# Patient Record
Sex: MALE | Race: WHITE | NOT HISPANIC OR LATINO | Employment: OTHER | URBAN - METROPOLITAN AREA
[De-identification: names, ages, dates, MRNs, and addresses within clinical notes are randomized per-mention and may not be internally consistent; named-entity substitution may affect disease eponyms.]

---

## 2017-01-13 ENCOUNTER — OFFICE VISIT (OUTPATIENT)
Dept: ENDOCRINOLOGY | Age: 67
End: 2017-01-13

## 2017-01-13 VITALS
HEIGHT: 70 IN | WEIGHT: 210 LBS | SYSTOLIC BLOOD PRESSURE: 128 MMHG | HEART RATE: 68 BPM | BODY MASS INDEX: 30.06 KG/M2 | DIASTOLIC BLOOD PRESSURE: 72 MMHG

## 2017-01-13 DIAGNOSIS — E11.9 TYPE 2 DIABETES MELLITUS WITHOUT COMPLICATION, WITHOUT LONG-TERM CURRENT USE OF INSULIN (HCC): Primary | ICD-10-CM

## 2017-01-13 DIAGNOSIS — I10 ESSENTIAL HYPERTENSION: ICD-10-CM

## 2017-01-13 DIAGNOSIS — E78.49 OTHER HYPERLIPIDEMIA: ICD-10-CM

## 2017-01-13 LAB
25(OH)D3+25(OH)D2 SERPL-MCNC: 48.9 NG/ML (ref 30–100)
ALBUMIN SERPL-MCNC: 4.7 G/DL (ref 3.5–5.2)
ALBUMIN/GLOB SERPL: 1.8 G/DL
ALP SERPL-CCNC: 75 U/L (ref 39–117)
ALT SERPL-CCNC: 41 U/L (ref 1–41)
AST SERPL-CCNC: 26 U/L (ref 1–40)
BILIRUB SERPL-MCNC: 0.4 MG/DL (ref 0.1–1.2)
BUN SERPL-MCNC: 15 MG/DL (ref 8–23)
BUN/CREAT SERPL: 16.9 (ref 7–25)
CALCIUM SERPL-MCNC: 10.3 MG/DL (ref 8.6–10.5)
CHLORIDE SERPL-SCNC: 99 MMOL/L (ref 98–107)
CHOLEST SERPL-MCNC: 120 MG/DL (ref 0–200)
CO2 SERPL-SCNC: 27.9 MMOL/L (ref 22–29)
CREAT SERPL-MCNC: 0.89 MG/DL (ref 0.76–1.27)
FOLATE SERPL-MCNC: >20 NG/ML (ref 4.78–24.2)
GLOBULIN SER CALC-MCNC: 2.6 GM/DL
GLUCOSE SERPL-MCNC: 211 MG/DL (ref 65–99)
HBA1C MFR BLD: 9.3 % (ref 4.8–5.6)
HDLC SERPL-MCNC: 33 MG/DL (ref 40–60)
LDLC SERPL CALC-MCNC: 54 MG/DL (ref 0–100)
POTASSIUM SERPL-SCNC: 4.7 MMOL/L (ref 3.5–5.2)
PROT SERPL-MCNC: 7.3 G/DL (ref 6–8.5)
SODIUM SERPL-SCNC: 141 MMOL/L (ref 136–145)
TRIGL SERPL-MCNC: 166 MG/DL (ref 0–150)
TSH SERPL DL<=0.005 MIU/L-ACNC: 1.66 MIU/ML (ref 0.27–4.2)
UNABLE TO VOID: NORMAL
VIT B12 SERPL-MCNC: 1776 PG/ML (ref 211–946)
VLDLC SERPL CALC-MCNC: 33.2 MG/DL (ref 5–40)

## 2017-01-13 PROCEDURE — 99204 OFFICE O/P NEW MOD 45 MIN: CPT | Performed by: INTERNAL MEDICINE

## 2017-01-13 RX ORDER — ATORVASTATIN CALCIUM 20 MG/1
TABLET, FILM COATED ORAL
COMMUNITY
Start: 2016-10-26 | End: 2017-02-09 | Stop reason: SDUPTHER

## 2017-01-13 NOTE — PROGRESS NOTES
CC : Type 2 dm    Andrea Fam 66 y.o. presents with Type 2 dm as a new patient. Referred by Dr. Denise.     Type 2 dm - Diagnosed about 18 years ago. Was started on oral agents initially. Insulin was started about 10 years ago. Currently on Levemir 10 units at bed time, Victoza 0.6 mg subq daily, Metformin 1000 mg po daily.    Checks BG once daily, FBG - 191 - 245 mg/dl.   No increased urination or increased thirst. No BG less than 60 mg/dl in the last one month, does have hypoglycemic awareness. Highest BG in the last 1 month was -  268 mg/dl.  Pt got his log book for me. No nausea and vomiting.   Up to date with eye exam and no dm retinopathy.   C/o tingling or numbness in his b/l feet and hands, no ulcers and amputations of his feet.   No CAD, CKD,CVA per pt.   Pt is physically active. Weight has been stable.   Pt tries to follow DM diet for most part.     Hba1c - 11% per pt.     Low testosterone - On androgel 1.62% one application daily. Managed by PCP.     HLP - On lipitor 20 mg po daily,  Niacin 500 mg po daily, Fish oil 1 tab daily.     I have reviewed the patient's allergies, medicines, past medical hx, family hx and social hx in detail.    Past Medical History   Diagnosis Date   • Diabetes mellitus    • Neuropathy    • Testosterone deficiency        Family History   Problem Relation Age of Onset   • Alzheimer's disease Mother    • Lymphoma Paternal Grandmother        Social History     Social History   • Marital status:      Spouse name: N/A   • Number of children: N/A   • Years of education: N/A     Occupational History   • Not on file.     Social History Main Topics   • Smoking status: Never Smoker   • Smokeless tobacco: Not on file   • Alcohol use Yes      Comment: 2  drinks per  month   • Drug use: Not on file   • Sexual activity: Not on file     Other Topics Concern   • Not on file     Social History Narrative   • No narrative on file       No Known Allergies      Current Outpatient Prescriptions:    •  atorvastatin (LIPITOR) 20 MG tablet, , Disp: , Rfl:   •  Cholecalciferol (VITAMIN D) 2000 UNITS tablet, Take 2,000 Units by mouth daily., Disp: , Rfl:   •  FOLIC ACID PO, Take  by mouth., Disp: , Rfl:   •  insulin detemir (LEVEMIR) 100 UNIT/ML injection, Inject  under the skin daily., Disp: , Rfl:   •  metFORMIN (GLUCOPHAGE) 500 MG tablet, Take 500 mg by mouth 2 (two) times a day with meals., Disp: , Rfl:   •  niacin 500 MG tablet, Take 500 mg by mouth every night., Disp: , Rfl:   •  Omega-3 Fatty Acids (FISH OIL) 1000 MG capsule capsule, Take  by mouth daily with breakfast., Disp: , Rfl:   •  OXcarbazepine  MG tablet sustained-release 24 hour, Take  by mouth., Disp: , Rfl:   •  Testosterone (FORTESTA) 10 MG/ACT (2%) gel, Place  on the skin., Disp: , Rfl:   •  vitamin B-12 (CYANOCOBALAMIN) 1000 MCG tablet, Take 1,000 mcg by mouth daily., Disp: , Rfl:   •  vitamin C (ASCORBIC ACID) 500 MG tablet, Take 500 mg by mouth daily., Disp: , Rfl:     Review of Systems   Constitutional: Negative for appetite change, chills, diaphoresis and fatigue.   HENT: Negative for hearing loss, nosebleeds, postnasal drip, trouble swallowing and voice change.    Eyes: Negative for photophobia, discharge and visual disturbance.   Respiratory: Negative for cough, shortness of breath and wheezing.    Cardiovascular: Negative for chest pain, palpitations and leg swelling.   Gastrointestinal: Negative for abdominal distention, abdominal pain, constipation, diarrhea, nausea and vomiting.   Endocrine: Negative for cold intolerance, heat intolerance, polydipsia and polyuria.   Genitourinary: Negative for dysuria, frequency and hematuria.   Musculoskeletal: Negative for arthralgias, back pain and myalgias.   Skin: Negative for pallor, rash and wound.   Neurological: Positive for numbness. Negative for dizziness, tremors, seizures, syncope, weakness and headaches.   Hematological: Negative for adenopathy.   Psychiatric/Behavioral:  "Negative for agitation, confusion and sleep disturbance. The patient is not nervous/anxious.        Objective:     Visit Vitals   • /72   • Pulse 68   • Ht 70\" (177.8 cm)   • Wt 210 lb (95.3 kg)   • BMI 30.13 kg/m2       Physical Exam   Constitutional: He is oriented to person, place, and time. He appears well-developed and well-nourished.   HENT:   Head: Normocephalic and atraumatic.   Mouth/Throat: Oropharynx is clear and moist.   Eyes: Conjunctivae and EOM are normal. Pupils are equal, round, and reactive to light.   Neck: Normal range of motion. Neck supple. Carotid bruit is not present. No tracheal deviation present. No thyromegaly present.   Acanthosis nigricans   Cardiovascular: Normal rate, regular rhythm and normal heart sounds.    HR - 72/min   Pulmonary/Chest: Effort normal and breath sounds normal. No stridor. He has no wheezes.   Abdominal: Soft. Bowel sounds are normal. He exhibits no distension. There is no tenderness. No hernia.   Musculoskeletal: Normal range of motion. He exhibits no edema.   Lymphadenopathy:     He has no cervical adenopathy.   Neurological: He is alert and oriented to person, place, and time. He has normal reflexes.   Intact pin prick and proprioception   Skin: Skin is warm and dry.   Psychiatric: He has a normal mood and affect. His behavior is normal. Judgment normal.   Vitals reviewed.         Results Review:    I reviewed the patient's new clinical results.    No results found for any previous visit.    Diagnoses and all orders for this visit:    Type 2 diabetes mellitus without complication, without long-term current use of insulin  -     Hemoglobin A1c  -     Vitamin D 25 Hydroxy  -     Vitamin B12 & Folate  -     TSH  -     Lipid Panel  -     Microalbumin / Creatinine Urine Ratio  -     Comprehensive Metabolic Panel    Other hyperlipidemia  -     Hemoglobin A1c  -     Vitamin D 25 Hydroxy  -     Vitamin B12 & Folate  -     TSH  -     Lipid Panel  -     Microalbumin / " Creatinine Urine Ratio  -     Comprehensive Metabolic Panel    Essential hypertension  -     Hemoglobin A1c  -     Vitamin D 25 Hydroxy  -     Vitamin B12 & Folate  -     TSH  -     Lipid Panel  -     Microalbumin / Creatinine Urine Ratio  -     Comprehensive Metabolic Panel    Type 2 diabetes mellitus  Will check HbA1c  Will increase levemir to 15 units at bedtime  Will consider increasing metformin to 1000 mg twice daily based on today's blood work up  No history of pancreatitis, we will consider increasing the dose of victoza.  Advised patient to check his blood sugars at least 2-3 times a day.  Will refer the pt for DM education.     Hyperlipidemia  Will check lipid panel     Low testosterone levels  Managed by primary care physician.    Asya Henao MD  01/13/17

## 2017-01-13 NOTE — MR AVS SNAPSHOT
Andrea Fam   1/13/2017 8:00 AM   Office Visit    Dept Phone:  [phone]   Encounter #:  [id number]    Provider:  Asya Henao MD   Department:  [facility]                Your Full Care Plan              Today's Medication Changes          These changes are accurate as of: 1/13/17  9:30 AM.  If you have any questions, ask your nurse or doctor.               Stop taking medication(s)listed here:     neomycin-polymyxin-hydrocortisone [id number] otic solution   Commonly known as:  CORTISPORIN   Stopped by:  Asya Henao MD                      Your Updated Medication List          This list is accurate as of: 1/13/17  9:30 AM.  Always use your most recent med list.                atorvastatin 20 MG tablet   Commonly known as:  LIPITOR       fish oil 1000 MG capsule capsule       FOLIC ACID PO       FORTESTA 10 MG/ACT (2%) gel   Generic drug:  Testosterone       insulin detemir 100 UNIT/ML injection   Commonly known as:  LEVEMIR       metFORMIN 500 MG tablet   Commonly known as:  GLUCOPHAGE       niacin 500 MG tablet       OXcarbazepine  MG tablet sustained-release 24 hour       vitamin B-12 1000 MCG tablet   Commonly known as:  CYANOCOBALAMIN       vitamin C 500 MG tablet   Commonly known as:  ASCORBIC ACID       Vitamin D 2000 UNITS tablet               We Performed the Following     Ambulatory Referral to Diabetic Education     Comprehensive Metabolic Panel     Hemoglobin A1c     Lipid Panel     Microalbumin / Creatinine Urine Ratio     TSH     Vitamin B12 & Folate     Vitamin D 25 Hydroxy       You Were Diagnosed With        Codes Comments    Type 2 diabetes mellitus without complication, without long-term current use of insulin    -  Primary ICD-10-CM: E11.9  ICD-9-CM: 250.00     Other hyperlipidemia     ICD-10-CM: E78.4  ICD-9-CM: 272.4     Essential hypertension     ICD-10-CM: I10  ICD-9-CM: 401.9       Instructions     None    Patient  "Instructions History      Upcoming Appointments     Visit Type Date Time Department    NEW PATIENT 2017  8:00 AM ALEX CABRERA    OFFICE VISIT 3/13/2017  9:15 AM ALEX Mendozahart Signup     [facility] [employer] allows you to send messages to your doctor, view your test results, renew your prescriptions, schedule appointments, and more. To sign up, go to [employer] and click on the Sign Up Now link in the New User? box. Enter your [employer] Activation Code exactly as it appears below along with the last four digits of your Social Security Number and your Date of Birth () to complete the sign-up process. If you do not sign up before the expiration date, you must request a new code.    [employer] Activation Code: [email or web address]  Expires: 2017  9:28 AM    If you have questions, you can email [email or web address] or call [phone] to talk to our [employer] staff. Remember, [employer] is NOT to be used for urgent needs. For medical emergencies, dial 911.               Other Info from Your Visit           Your Appointments     Mar 13, 2017  9:15 AM EDT   Office Visit with Asya Henao MD   [facility] (--)    [address] [place] [id number] [phone]           Arrive 15 minutes prior to appointment.              Allergies     No Known Allergies      Vital Signs     Blood Pressure Pulse Height Weight Body Mass Index Smoking Status    128/72 68 70\" (177.8 cm) 210 lb (95.3 kg) 30.13 kg/m2 Never Smoker      Problems and Diagnoses Noted     Type 2 diabetes mellitus without complication, without long-term current use of insulin    -  Primary    Other hyperlipidemia        High blood pressure            "

## 2017-01-31 NOTE — TELEPHONE ENCOUNTER
----- Message from Tasia Reynolds sent at 1/30/2017 11:59 AM EST -----  Contact: star  [phone]  [facility]  Needs to say glucophage  Prescription was filled in nov  But needs to make sure next time it is read as of the below      metFORMIN GLUCOPHAGE 500 MG tablet         Take 500 mg by mouth 2 two times a day with meals.       [facility] - [address] - [phone]  - [phone] (Phone)  [phone] (Fax)          Sent to mail order pharmacy

## 2017-02-09 ENCOUNTER — HOSPITAL ENCOUNTER (OUTPATIENT)
Dept: GENERAL RADIOLOGY | Facility: HOSPITAL | Age: 67
Discharge: HOME | End: 2017-02-09
Admitting: ORTHOPAEDIC SURGERY

## 2017-02-09 ENCOUNTER — APPOINTMENT (OUTPATIENT)
Dept: PREADMISSION TESTING | Facility: HOSPITAL | Age: 67
End: 2017-02-09

## 2017-02-09 VITALS
HEIGHT: 70 IN | BODY MASS INDEX: 30.26 KG/M2 | WEIGHT: 211.4 LBS | RESPIRATION RATE: 18 BRPM | SYSTOLIC BLOOD PRESSURE: 138 MMHG | HEART RATE: 91 BPM | DIASTOLIC BLOOD PRESSURE: 84 MMHG | TEMPERATURE: 96.9 F | OXYGEN SATURATION: 95 %

## 2017-02-09 LAB
ABO GROUP BLD: NORMAL
ANION GAP SERPL CALCULATED.3IONS-SCNC: 16.1 MMOL/L
BACTERIA UR QL AUTO: ABNORMAL /HPF
BILIRUB UR QL STRIP: NEGATIVE
BLD GP AB SCN SERPL QL: NEGATIVE
BUN BLD-MCNC: 12 MG/DL (ref 8–23)
BUN/CREAT SERPL: 14.8 (ref 7–25)
CALCIUM SPEC-SCNC: 9.5 MG/DL (ref 8.6–10.5)
CHLORIDE SERPL-SCNC: 96 MMOL/L (ref 98–107)
CLARITY UR: CLEAR
CO2 SERPL-SCNC: 26.9 MMOL/L (ref 22–29)
COD CRY URNS QL: ABNORMAL /HPF
COLOR UR: YELLOW
CREAT BLD-MCNC: 0.81 MG/DL (ref 0.76–1.27)
DEPRECATED RDW RBC AUTO: 43.3 FL (ref 37–54)
ERYTHROCYTE [DISTWIDTH] IN BLOOD BY AUTOMATED COUNT: 12.9 % (ref 11.5–14.5)
GFR SERPL CREATININE-BSD FRML MDRD: 95 ML/MIN/1.73
GLUCOSE BLD-MCNC: 207 MG/DL (ref 65–99)
GLUCOSE UR STRIP-MCNC: ABNORMAL MG/DL
HCT VFR BLD AUTO: 41.6 % (ref 40.4–52.2)
HGB BLD-MCNC: 14.2 G/DL (ref 13.7–17.6)
HGB UR QL STRIP.AUTO: NEGATIVE
HYALINE CASTS UR QL AUTO: ABNORMAL /LPF
KETONES UR QL STRIP: ABNORMAL
LEUKOCYTE ESTERASE UR QL STRIP.AUTO: ABNORMAL
MCH RBC QN AUTO: 31.4 PG (ref 27–32.7)
MCHC RBC AUTO-ENTMCNC: 34.1 G/DL (ref 32.6–36.4)
MCV RBC AUTO: 92 FL (ref 79.8–96.2)
NITRITE UR QL STRIP: NEGATIVE
PH UR STRIP.AUTO: <=5 [PH] (ref 5–8)
PLATELET # BLD AUTO: 200 10*3/MM3 (ref 140–500)
PMV BLD AUTO: 9.4 FL (ref 6–12)
POTASSIUM BLD-SCNC: 4.1 MMOL/L (ref 3.5–5.2)
PROT UR QL STRIP: NEGATIVE
RBC # BLD AUTO: 4.52 10*6/MM3 (ref 4.6–6)
RBC # UR: ABNORMAL /HPF
REF LAB TEST METHOD: ABNORMAL
RH BLD: NEGATIVE
SODIUM BLD-SCNC: 139 MMOL/L (ref 136–145)
SP GR UR STRIP: 1.03 (ref 1–1.03)
SQUAMOUS #/AREA URNS HPF: ABNORMAL /HPF
UROBILINOGEN UR QL STRIP: ABNORMAL
WBC NRBC COR # BLD: 5.34 10*3/MM3 (ref 4.5–10.7)
WBC UR QL AUTO: ABNORMAL /HPF

## 2017-02-09 PROCEDURE — 93010 ELECTROCARDIOGRAM REPORT: CPT | Performed by: INTERNAL MEDICINE

## 2017-02-09 PROCEDURE — 86900 BLOOD TYPING SEROLOGIC ABO: CPT

## 2017-02-09 PROCEDURE — 86850 RBC ANTIBODY SCREEN: CPT

## 2017-02-09 PROCEDURE — 87086 URINE CULTURE/COLONY COUNT: CPT | Performed by: ORTHOPAEDIC SURGERY

## 2017-02-09 PROCEDURE — 81001 URINALYSIS AUTO W/SCOPE: CPT | Performed by: ORTHOPAEDIC SURGERY

## 2017-02-09 PROCEDURE — 86901 BLOOD TYPING SEROLOGIC RH(D): CPT

## 2017-02-09 PROCEDURE — 80048 BASIC METABOLIC PNL TOTAL CA: CPT | Performed by: ORTHOPAEDIC SURGERY

## 2017-02-09 PROCEDURE — 73030 X-RAY EXAM OF SHOULDER: CPT

## 2017-02-09 PROCEDURE — 36415 COLL VENOUS BLD VENIPUNCTURE: CPT

## 2017-02-09 PROCEDURE — 85027 COMPLETE CBC AUTOMATED: CPT | Performed by: ORTHOPAEDIC SURGERY

## 2017-02-09 PROCEDURE — 93005 ELECTROCARDIOGRAM TRACING: CPT

## 2017-02-09 RX ORDER — ATORVASTATIN CALCIUM 10 MG/1
10 TABLET, FILM COATED ORAL DAILY
COMMUNITY
End: 2018-11-01

## 2017-02-09 RX ORDER — TESTOSTERONE 20.25 MG/1.25G
2 GEL TOPICAL DAILY
COMMUNITY
End: 2017-11-01 | Stop reason: SDUPTHER

## 2017-02-09 NOTE — DISCHARGE INSTRUCTIONS
Take the following medications the morning of surgery with a small sip of water.    THE HOSPITAL WILL CALL YOU THE DAY BEFORE WITH YOUR ARRIVAL TIME.      General Instructions:  • Do not eat or drink after midnight: includes water, mints, or gum. You may brush your teeth.  • Do not smoke, chew tobacco, or drink alcohol.  • Bring medications in original bottles, any inhalers and if applicable your C-PAP/ BI-PAP machine.  • Bring any papers given to you in the doctor’s office.  • Wear clean comfortable clothes and socks.  • Do not wear contact lenses or make-up.  Bring a case for your glasses if applicable.   • Bring crutches or walker if applicable.  • Leave all other valuables and jewelry at home.    If you were given a blood bank ID arm band remember to bring it with you the day of surgery.    Preventing a Surgical Site Infection:  Shower on the morning of surgery using a fresh bar of anti-bacterial soap (such as Dial) and clean washcloth.  Dry with a clean towel and dress in clean clothing.  For 2 to 3 days before surgery, avoid shaving with a razor near where you will have surgery because the razor can irritate skin and make it easier to develop an infection  Ask your surgeon if you will be receiving antibiotics prior to surgery  Make sure you, your family, and all healthcare providers clean their hands with soap and water or an alcohol based hand  before caring for you or your wound  If at all possible, quit smoking as many days before surgery as you can.    Day of surgery:  Upon arrival, a Pre-op nurse and Anesthesiologist will review your health history, obtain vital signs, and answer questions you may have.  The only belongings needed at this time will be your home medications and if applicable your C-PAP/BI-PAP machine.  If you are staying overnight your family can leave the rest of your belongings in the car and bring them to your room later.  A Pre-op nurse will start an IV and you may receive  medication in preparation for surgery, including something to help you relax.  Your family will be able to see you in the Pre-op area.  While you are in surgery your family should notify the waiting room  if they leave the waiting room area and provide a contact phone number.    Please be aware that surgery does come with discomfort.  We want to make every effort to control your discomfort so please discuss any uncontrolled symptoms with your nurse.   Your doctor will most likely have prescribed pain medications.      If you are going home after surgery you will receive individualized written care instructions before being discharged.  A responsible adult must drive you to and from the hospital on the day of your surgery and stay with you for 24 hours.    If you are staying overnight following surgery, you will be transported to your hospital room following the recovery period.  [facility] has all private rooms.    If you have any questions please call Pre-Admission Testing at [phone].  Deductibles and co-payments are collected on the day of service. Please be prepared to pay the required co-pay, deductible or deposit on the day of service as defined by your plan.    2% CHLORAHEXIDINE GLUCONATE* CLOTH  Preparing or “prepping” skin before surgery can reduce the risk of infection at the surgical site. To make the process easier, [facility] has chosen disposable cloths moistened with a rinse-free, 2% Chlorhexidine Gluconate (CHG) antiseptic solution. The steps below outline the prepping process and should be carefully followed.        Use the prep cloth on the area that is circled in the diagram             Directions Night before Surgery  1) Shower using a fresh bar of anti-bacterial soap (such as Dial) and clean washcloth.  Use a clean towel to completely dry your skin.  2) Do not use any lotions, oils or creams on your skin.  3) Open the package and remove 1 cloth, wipe  your skin for 30 seconds in a circular motion.  Allow to dry for 3 minutes.  4) Repeat #3 with second cloth.  5) Do not touch your eyes, ears, or mouth with the prep cloth.  6) Allow the wet prep solution to air dry.  7) Discard the prep cloth and wash your hands with soap and water.   8) Dress in clean bed clothes and sleep on fresh clean bed sheets.   9) You may experience some temporary itching after the prep.    Directions Day of Surgery  1) Repeat steps 1,2,3,4,5,6,7, and 9.   2) Dress in clean clothes before coming to the hospital.    BACTROBAN NASAL OINTMENT  There are many germs normally in your nose. Bactroban is an ointment that will help reduce these germs. Please follow these instructions for Bactroban use:      ____The day before surgery in the morning  Date________    ____The day before surgery in the evening              Date________    ____The day of surgery in the morning    Date________    **Squirt ½ package of Bactroban Ointment onto a cotton applicator and apply to inside of 1st nostril.  Squirt the remaining Bactroban and apply to the inside of the other nostril.

## 2017-02-10 LAB — BACTERIA SPEC AEROBE CULT: NORMAL

## 2017-03-13 ENCOUNTER — OFFICE VISIT (OUTPATIENT)
Dept: ENDOCRINOLOGY | Age: 67
End: 2017-03-13

## 2017-03-13 VITALS
BODY MASS INDEX: 30.18 KG/M2 | OXYGEN SATURATION: 88 % | HEART RATE: 86 BPM | SYSTOLIC BLOOD PRESSURE: 130 MMHG | HEIGHT: 70 IN | WEIGHT: 210.8 LBS | DIASTOLIC BLOOD PRESSURE: 80 MMHG

## 2017-03-13 DIAGNOSIS — Z79.4 TYPE 2 DIABETES MELLITUS WITHOUT COMPLICATION, WITH LONG-TERM CURRENT USE OF INSULIN (HCC): Primary | ICD-10-CM

## 2017-03-13 DIAGNOSIS — E11.9 TYPE 2 DIABETES MELLITUS WITHOUT COMPLICATION, WITH LONG-TERM CURRENT USE OF INSULIN (HCC): Primary | ICD-10-CM

## 2017-03-13 DIAGNOSIS — E78.49 OTHER HYPERLIPIDEMIA: ICD-10-CM

## 2017-03-13 PROCEDURE — 99214 OFFICE O/P EST MOD 30 MIN: CPT | Performed by: INTERNAL MEDICINE

## 2017-03-13 NOTE — PROGRESS NOTES
67 y.o.    Patient Care Team:  Norman Denise MD as PCP - General  Norman Denise MD as PCP - Family Medicine    Chief Complaint:      Subjective     HPI  Andrea Fam 66 y.o. presents with Type 2 dm as a follow up patient. Referred by Dr. Denise.      Type 2 dm - Diagnosed about 18 years ago. Was started on oral agents initially. Insulin was started about 10 years ago. Currently on Levemir 15 units at bed time, Victoza 1.8 mg subq daily, Metformin 1000 mg po twice daily.   Checks BG 1-2 times a day.  Fasting blood sugars 110-1 20 mg/dL.  Postprandial blood sugars 150-200 mg/dL..   No increased urination or increased thirst. No BG less than 60 mg/dl in the last one month, does have hypoglycemic awareness. Highest BG in the last 1 month was - 234 mg/dl.  Pt got his log book for me. No nausea and vomiting.   Up to date with eye exam and no dm retinopathy. Cataract surgery in June 2017.   C/o tingling or numbness in his b/l feet and hands, no ulcers and amputations of his feet.   No CAD, CKD,CVA per pt.   Pt is physically active. Weight has been stable.   Pt tries to follow DM diet for most part.  Patient has a pending appointment for diabetic education.    AeM2r-4.3% from January 2017.     Low testosterone - On androgel 1.62% one application daily. Managed by PCP.      HLP - On lipitor 10 mg po daily, Niacin 500 mg po daily, Fish oil 1 tab daily.     Interval History      The following portions of the patient's history were reviewed and updated as appropriate: allergies, current medications, past family history, past medical history, past social history, past surgical history and problem list.    Past Medical History   Diagnosis Date   • Arthritis    • Cataract      HAVING SURGERY IN JUNE   • Diabetes mellitus    • High cholesterol    • Neuropathy      FINGERS AND LOWER LEGS   • Obstructive sleep apnea on CPAP    • Shoulder pain, right    • Testosterone deficiency    • Wears contact lenses      Family  History   Problem Relation Age of Onset   • Alzheimer's disease Mother    • Lymphoma Paternal Grandmother      Social History     Social History   • Marital status:      Spouse name: N/A   • Number of children: N/A   • Years of education: N/A     Occupational History   • Not on file.     Social History Main Topics   • Smoking status: Former Smoker     Packs/day: 1.50     Years: 30.00     Types: Cigarettes   • Smokeless tobacco: Not on file      Comment: QUIT AGE 48   • Alcohol use Yes      Comment: 2  drinks per  month   • Drug use: No   • Sexual activity: Defer     Other Topics Concern   • Not on file     Social History Narrative     No Known Allergies    Current Outpatient Prescriptions:   •  APPLE CIDER VINEGAR PO, Take 1 tablet by mouth 2 (Two) Times a Day. VINTAB, Disp: , Rfl:   •  atorvastatin (LIPITOR) 10 MG tablet, Take 10 mg by mouth Daily., Disp: , Rfl:   •  Cholecalciferol (VITAMIN D) 2000 UNITS tablet, Take 2,000 Units by mouth 2 (Two) Times a Day., Disp: , Rfl:   •  FOLIC ACID PO, Take 400 mcg by mouth 2 (Two) Times a Day., Disp: , Rfl:   •  insulin detemir (LEVEMIR) 100 UNIT/ML injection, Inject 15 Units under the skin Every Night., Disp: , Rfl:   •  Liraglutide (VICTOZA SC), Inject 1.8 mg under the skin Every Morning., Disp: , Rfl:   •  metFORMIN (GLUCOPHAGE) 500 MG tablet, Take 1 tablet by mouth 2 (Two) Times a Day With Meals. (Patient taking differently: Take 1,000 mg by mouth 2 (Two) Times a Day With Meals.), Disp: 180 tablet, Rfl: 3  •  niacin 500 MG tablet, Take 500 mg by mouth 2 (Two) Times a Day With Meals., Disp: , Rfl:   •  Omega-3 Fatty Acids (FISH OIL) 1000 MG capsule capsule, Take 1,200 mg by mouth 2 (Two) Times a Day With Meals. PT TO HOLD MED PRIOR TO OR, Disp: , Rfl:   •  OXcarbazepine  MG tablet sustained-release 24 hour, Take 1-1.5 tablets by mouth 2 (Two) Times a Day. PT TAKES 1 TAB IN THE AM AND 1.5 TAB IN THE PM, Disp: , Rfl:   •  Testosterone (ANDROGEL) 20.25  "MG/1.25GM (1.62%) gel, Place  on the skin Daily. 2 PUMPS IN THE AM, Disp: , Rfl:   •  vitamin B-12 (CYANOCOBALAMIN) 1000 MCG tablet, Take 1,000 mcg by mouth Every Other Day. TAKES ON ODD NUMBER DAYS, Disp: , Rfl:   •  vitamin C (ASCORBIC ACID) 500 MG tablet, Take 1,000 mg by mouth 2 (Two) Times a Day., Disp: , Rfl:         Review of Systems   Constitutional: Negative for appetite change, chills, diaphoresis and fatigue.   HENT: Negative for hearing loss, nosebleeds, postnasal drip, trouble swallowing and voice change.    Eyes: Negative for photophobia, discharge and visual disturbance.   Respiratory: Negative for cough, shortness of breath and wheezing.    Cardiovascular: Negative for chest pain, palpitations and leg swelling.   Gastrointestinal: Negative for abdominal distention, abdominal pain, constipation, diarrhea, nausea and vomiting.   Endocrine: Negative for cold intolerance, heat intolerance, polydipsia and polyuria.   Genitourinary: Negative for dysuria, frequency and hematuria.   Musculoskeletal: Negative for arthralgias, back pain and myalgias.   Skin: Negative for pallor, rash and wound.   Neurological: Positive for numbness. Negative for dizziness, tremors, seizures, syncope, weakness and headaches.   Hematological: Negative for adenopathy.   Psychiatric/Behavioral: Negative for agitation, confusion and sleep disturbance. The patient is not nervous/anxious.        Objective       Vitals:    03/13/17 0841   BP: 130/80   Pulse: 86   SpO2: (!) 88%   Weight: 210 lb 12.8 oz (95.6 kg)   Height: 69.5\" (176.5 cm)     Body mass index is 30.68 kg/(m^2).      Physical Exam   Constitutional: He is oriented to person, place, and time. He appears well-developed and well-nourished.   HENT:   Head: Normocephalic and atraumatic.   Mouth/Throat: Oropharynx is clear and moist.   Eyes: Conjunctivae and EOM are normal. Pupils are equal, round, and reactive to light.   Neck: Normal range of motion. Neck supple. Carotid " bruit is not present. No tracheal deviation present. No thyromegaly present.   Cardiovascular: Normal rate, regular rhythm and normal heart sounds.    Pulmonary/Chest: Effort normal and breath sounds normal. No stridor. He has no wheezes.   Abdominal: Soft. Bowel sounds are normal. He exhibits no distension. There is no tenderness. No hernia.   Musculoskeletal: Normal range of motion. He exhibits no edema.    Andrea had a diabetic foot exam performed today.   During the foot exam he had a monofilament test performed.    Neurological Sensory Findings -  Unaltered sharp/dull right ankle/foot discrimination and unaltered sharp/dull left ankle/foot discrimination. No right ankle/foot altered proprioception and no left ankle/foot altered proprioception    Vascular Status -  His exam exhibits no right foot edema. His exam exhibits no left foot edema.   Foot Structure and Biomechanics -  His right foot exhibits hammer toes.  His left foot exhibits hammer toes.  Lymphadenopathy:     He has no cervical adenopathy.   Neurological: He is alert and oriented to person, place, and time. He has normal reflexes.   Skin: Skin is warm and dry.   Psychiatric: He has a normal mood and affect. His behavior is normal. Judgment normal.   Vitals reviewed.    Results Review:     I reviewed the patient's new clinical results.    Medical records reviewed  Summary:done      Appointment on 02/09/2017   Component Date Value Ref Range Status   • ABO Type 02/09/2017 O   Final   • RH type 02/09/2017 Negative   Final   • Antibody Screen 02/09/2017 Negative   Final   • Glucose 02/09/2017 207* 65 - 99 mg/dL Final   • BUN 02/09/2017 12  8 - 23 mg/dL Final   • Creatinine 02/09/2017 0.81  0.76 - 1.27 mg/dL Final   • Sodium 02/09/2017 139  136 - 145 mmol/L Final   • Potassium 02/09/2017 4.1  3.5 - 5.2 mmol/L Final   • Chloride 02/09/2017 96* 98 - 107 mmol/L Final   • CO2 02/09/2017 26.9  22.0 - 29.0 mmol/L Final   • Calcium 02/09/2017 9.5  8.6 - 10.5 mg/dL  Final   • eGFR Non African Amer 02/09/2017 95  >60 mL/min/1.73 Final   • BUN/Creatinine Ratio 02/09/2017 14.8  7.0 - 25.0 Final   • Anion Gap 02/09/2017 16.1  mmol/L Final   • WBC 02/09/2017 5.34  4.50 - 10.70 10*3/mm3 Final   • RBC 02/09/2017 4.52* 4.60 - 6.00 10*6/mm3 Final   • Hemoglobin 02/09/2017 14.2  13.7 - 17.6 g/dL Final   • Hematocrit 02/09/2017 41.6  40.4 - 52.2 % Final   • MCV 02/09/2017 92.0  79.8 - 96.2 fL Final   • MCH 02/09/2017 31.4  27.0 - 32.7 pg Final   • MCHC 02/09/2017 34.1  32.6 - 36.4 g/dL Final   • RDW 02/09/2017 12.9  11.5 - 14.5 % Final   • RDW-SD 02/09/2017 43.3  37.0 - 54.0 fl Final   • MPV 02/09/2017 9.4  6.0 - 12.0 fL Final   • Platelets 02/09/2017 200  140 - 500 10*3/mm3 Final   • Color, UA 02/09/2017 Yellow  Yellow, Straw Final   • Appearance, UA 02/09/2017 Clear  Clear Final   • pH, UA 02/09/2017 <=5.0  5.0 - 8.0 Final   • Specific Gravity, UA 02/09/2017 1.028  1.005 - 1.030 Final   • Glucose, UA 02/09/2017 250 mg/dL (1+)* Negative Final   • Ketones, UA 02/09/2017 Trace* Negative Final   • Bilirubin, UA 02/09/2017 Negative  Negative Final   • Blood, UA 02/09/2017 Negative  Negative Final   • Protein, UA 02/09/2017 Negative  Negative Final   • Leuk Esterase, UA 02/09/2017 Small (1+)* Negative Final   • Nitrite, UA 02/09/2017 Negative  Negative Final   • Urobilinogen, UA 02/09/2017 0.2 E.U./dL  0.2 - 1.0 E.U./dL Final   • RBC, UA 02/09/2017 0-2* None Seen /HPF Final   • WBC, UA 02/09/2017 13-20* None Seen /HPF Final   • Bacteria, UA 02/09/2017 Trace* None Seen /HPF Final   • Squamous Epithelial Cells, UA 02/09/2017 0-2  None Seen, 0-2 /HPF Final   • Hyaline Casts, UA 02/09/2017 0-2  None Seen /LPF Final   • Calcium Oxalate Crystals, UA 02/09/2017 Small/1+  None Seen /HPF Final   • Methodology 02/09/2017 Automated Microscopy   Final   • Urine Culture 02/09/2017 25,000 CFU/mL Mixed Culture   Final     Lab Results   Component Value Date    HGBA1C 9.30 (H) 01/13/2017     Lab Results    Component Value Date    CREATININE 0.81 02/09/2017     Imaging Results (most recent)     None                Assessment and Plan:    Diagnoses and all orders for this visit:    Type 2 diabetes mellitus without complication, with long-term current use of insulin  -     Hemoglobin A1c; Future  -     Lipid Panel; Future  -     Microalbumin / Creatinine Urine Ratio; Future  -     TSH; Future  -     Vitamin B12 & Folate; Future  -     Comprehensive Metabolic Panel; Future    Other hyperlipidemia  -     Hemoglobin A1c; Future  -     Lipid Panel; Future  -     Microalbumin / Creatinine Urine Ratio; Future  -     TSH; Future  -     Vitamin B12 & Folate; Future  -     Comprehensive Metabolic Panel; Future    Type 2 diabetes mellitus  Will increase Levemir to 18 units at bedtime  Will continue metformin thousand milligrams twice daily  Will continue Victoza 1.8 mg subcutaneous once daily  Counseled patient on the side effects of these medications and patient is tolerating them with no side effects  Counseled patient on diet and exercise  Patient has pending appointment for diabetic education    Hyperlipidemia  LDL at goal  Continue Lipitor 10 mg oral daily, fish oil, niacin    Testosterone is being prescribed by his primary care physician.     The total time spent more than 25 min for old record and lab review and face- to- face, of which greater than 50% of time was spent in counseling the patient on treatment options, instructions for management/treatment and follow up and importance of compliance with chosen management or treatment  options

## 2017-03-30 ENCOUNTER — HOSPITAL ENCOUNTER (OUTPATIENT)
Dept: GENERAL RADIOLOGY | Facility: HOSPITAL | Age: 67
Discharge: HOME | End: 2017-03-30
Admitting: ORTHOPAEDIC SURGERY

## 2017-03-30 ENCOUNTER — APPOINTMENT (OUTPATIENT)
Dept: PREADMISSION TESTING | Facility: HOSPITAL | Age: 67
End: 2017-03-30

## 2017-03-30 VITALS
TEMPERATURE: 97 F | WEIGHT: 209.3 LBS | DIASTOLIC BLOOD PRESSURE: 86 MMHG | HEIGHT: 70 IN | SYSTOLIC BLOOD PRESSURE: 134 MMHG | OXYGEN SATURATION: 96 % | BODY MASS INDEX: 29.96 KG/M2 | HEART RATE: 79 BPM | RESPIRATION RATE: 16 BRPM

## 2017-03-30 LAB
ABO GROUP BLD: NORMAL
ANION GAP SERPL CALCULATED.3IONS-SCNC: 13.7 MMOL/L
BILIRUB UR QL STRIP: NEGATIVE
BLD GP AB SCN SERPL QL: NEGATIVE
BUN BLD-MCNC: 14 MG/DL (ref 8–23)
BUN/CREAT SERPL: 15.2 (ref 7–25)
CALCIUM SPEC-SCNC: 10 MG/DL (ref 8.6–10.5)
CHLORIDE SERPL-SCNC: 98 MMOL/L (ref 98–107)
CLARITY UR: CLEAR
CO2 SERPL-SCNC: 27.3 MMOL/L (ref 22–29)
COLOR UR: YELLOW
CREAT BLD-MCNC: 0.92 MG/DL (ref 0.76–1.27)
DEPRECATED RDW RBC AUTO: 44.9 FL (ref 37–54)
ERYTHROCYTE [DISTWIDTH] IN BLOOD BY AUTOMATED COUNT: 13 % (ref 11.5–14.5)
GFR SERPL CREATININE-BSD FRML MDRD: 82 ML/MIN/1.73
GLUCOSE BLD-MCNC: 131 MG/DL (ref 65–99)
GLUCOSE UR STRIP-MCNC: ABNORMAL MG/DL
HCT VFR BLD AUTO: 44.3 % (ref 40.4–52.2)
HGB BLD-MCNC: 14.8 G/DL (ref 13.7–17.6)
HGB UR QL STRIP.AUTO: NEGATIVE
KETONES UR QL STRIP: ABNORMAL
LEUKOCYTE ESTERASE UR QL STRIP.AUTO: NEGATIVE
MCH RBC QN AUTO: 31.7 PG (ref 27–32.7)
MCHC RBC AUTO-ENTMCNC: 33.4 G/DL (ref 32.6–36.4)
MCV RBC AUTO: 94.9 FL (ref 79.8–96.2)
NITRITE UR QL STRIP: NEGATIVE
PH UR STRIP.AUTO: 5.5 [PH] (ref 5–8)
PLATELET # BLD AUTO: 203 10*3/MM3 (ref 140–500)
PMV BLD AUTO: 9.5 FL (ref 6–12)
POTASSIUM BLD-SCNC: 5 MMOL/L (ref 3.5–5.2)
PROT UR QL STRIP: NEGATIVE
RBC # BLD AUTO: 4.67 10*6/MM3 (ref 4.6–6)
RH BLD: NEGATIVE
SODIUM BLD-SCNC: 139 MMOL/L (ref 136–145)
SP GR UR STRIP: >=1.03 (ref 1–1.03)
UROBILINOGEN UR QL STRIP: ABNORMAL
WBC NRBC COR # BLD: 6.2 10*3/MM3 (ref 4.5–10.7)

## 2017-03-30 PROCEDURE — 81003 URINALYSIS AUTO W/O SCOPE: CPT | Performed by: ORTHOPAEDIC SURGERY

## 2017-03-30 PROCEDURE — 73030 X-RAY EXAM OF SHOULDER: CPT

## 2017-03-30 PROCEDURE — 80048 BASIC METABOLIC PNL TOTAL CA: CPT | Performed by: ORTHOPAEDIC SURGERY

## 2017-03-30 PROCEDURE — 36415 COLL VENOUS BLD VENIPUNCTURE: CPT

## 2017-03-30 PROCEDURE — 86900 BLOOD TYPING SEROLOGIC ABO: CPT | Performed by: ORTHOPAEDIC SURGERY

## 2017-03-30 PROCEDURE — 86901 BLOOD TYPING SEROLOGIC RH(D): CPT | Performed by: ORTHOPAEDIC SURGERY

## 2017-03-30 PROCEDURE — 85027 COMPLETE CBC AUTOMATED: CPT | Performed by: ORTHOPAEDIC SURGERY

## 2017-03-30 PROCEDURE — 86850 RBC ANTIBODY SCREEN: CPT | Performed by: ORTHOPAEDIC SURGERY

## 2017-03-30 PROCEDURE — 63710000001 MUPIROCIN 2 % OINTMENT 0.9 G SYRINGE: Performed by: ORTHOPAEDIC SURGERY

## 2017-03-30 PROCEDURE — A9270 NON-COVERED ITEM OR SERVICE: HCPCS | Performed by: ORTHOPAEDIC SURGERY

## 2017-04-04 ENCOUNTER — TELEPHONE (OUTPATIENT)
Dept: ENDOCRINOLOGY | Age: 67
End: 2017-04-04

## 2017-04-04 NOTE — TELEPHONE ENCOUNTER
----- Message from Tara Cabello sent at 3/31/2017 12:09 PM EDT -----  Contact: patient  Patient was told by surgeon not to take certain medications and is wanting to take him off as many medications as possible, prior to surgery on April 13th, total reverse shoulder surgery. Just checking to see what he can go off of with Dr Henao and he says you can respond through [facility] message      Response though my chart at patient request

## 2017-04-05 ENCOUNTER — OFFICE VISIT (OUTPATIENT)
Dept: DIABETES SERVICES | Facility: HOSPITAL | Age: 67
End: 2017-04-05

## 2017-04-05 PROCEDURE — 97802 MEDICAL NUTRITION INDIV IN: CPT

## 2017-04-05 NOTE — CONSULTS
ConsultsConsult provided for ~ [id number] calorie, consistent carbohydrate, reduced total/saturated fats and refined carbohydrates, with emphasis on lean proteins, higher fiber foods, and less processed carbohydrates.  Meal plan provided to support healthful eating patterns, emphasizing a variety of nutrient dense foods in appropriate portion sizes to improve weight/lipid/glycemic management and overall health.  Also discussed eating out, healthy meal preparation, reading the nutrition facts label and the benefits of activity/exercise when medically advised.  Patient verbalized understanding, but encouraged to contact this RD if further questions/concerns.

## 2017-04-07 ENCOUNTER — TELEPHONE (OUTPATIENT)
Dept: ENDOCRINOLOGY | Age: 67
End: 2017-04-07

## 2017-04-07 NOTE — TELEPHONE ENCOUNTER
----- Message from Tara Cabello sent at 4/7/2017  9:59 AM EDT -----  Patient is confused about when to cancel the medications. Patient only took a half pill of Levemir this morning thinking that he is to reduce this medication until surgery, Wife is unsure and thinks that he should continue levemir until the night before surgery. [facility] message was unclear.   Please call them back today  Lana his wife [phone]    Sent to [facility] because basil is out      SPOKE WITH PATIENT'S WIFE AND ADVISED HER THAT PATIENT SHOULD CONTINUE ALL MEDICATIONS UNTIL THE DAY OF THE SURGERY IN EXCEPTION OF THE NIGHT BEFORE HE SHOULD ONLY TAKE 9 UNITS OF LEVEMIR. RESUME ALL MEDICATIONS AS PRESCRIBED THE DAY AFTER SURGERY.

## 2017-04-11 RX ORDER — CEFAZOLIN SODIUM 2 G/100ML
2 INJECTION, SOLUTION INTRAVENOUS EVERY 8 HOURS
Status: DISCONTINUED | OUTPATIENT
Start: 2017-04-13 | End: 2017-04-14 | Stop reason: HOSPADM

## 2017-04-11 NOTE — H&P
HPI  Chief complaint right shoulder pain.  66-year-old  male with chronic pain in his right shoulder having failed a prolonged course of conservative care with an obvious retracted large rotator cuff tear right shoulder.  He said he is sick of conservative care which has continued to limit his ability to play golf and do other activities of daily living and he is also having difficulty sleeping because of the pain.  Review of Systems:  Positive for: Joint Pain.    Patient denies: Abdominal Pain, Bleeding, Chest Pain, Convulsions/Seizure, Decreased Motion, Depression, Difficulty Swallowing, Easy Bruisability, Emotional Disturbances, Eyes or Vision Problems, Fecal Incontinence, Fever/Chills, Headaches, Increased Thirst, Increased Hunger, Insomnia, Nausea/Vomiting, Night Sweats, Poor Balance, Persistent Cough, Rash, Shortness of Breath, Shortness of Breath While Lying down, Skin Problems, Urinary Retention and Weakness.  Allergies:  * no known allergies  * metal - negative  Medications:  provent sr sleep apnea therapy misc (nasal dilators)   vitamin d3 caps (cholecalciferol caps) 2000 iu once am and once pm  vitamin c caps (ascorbic acid caps) 1000mg once am once pm  b-12 caps (cyanocobalamin caps) 1000mcg  * vintab once am and once pm  victoza sopn (liraglutide sopn) 1.2mg in the morning  levemir 100 unit/ml sc soln (insulin detemir) 15ml once a day  * glycation mgr once am once pm  androgel gel (testosterone gel) 1.62% 20.25mg 2 pumps in the am  folic acid 400 mcg oral tabs (folic acid) twice daily  * oxcarbazepine 600mg twice daily  * niacin 500mg twice daily  * atrovastatin 10mg once daily  metformin hcl  tabs (metformin hcl tabs) 500mg 2 tabs am 2 tab pm  fish oil  caps (omega-3 fatty acids caps) 1200mg twice a day  Patient History of:  SLEEP APNEA/CPAP/BIPAP  HIGH CHOLESTEROL  BLOOD CLOTS/EMBOLISM - NEGATIVE  Surgical History:  SEPTOPLASTY-   L5 SX-   left Rotator Cuff Repair-[[id number]]   Known Family  History of:  cancer-grandparent  Social History:  Social history taken on 01/23/2017 states LAURIE PENA is a  66 year old male.  He has quit using tobacco products.  He has fallen in the last 12 months.      Past medical, social, family histories and ROS reviewed today with the patient and changes documented in the chart (01/23/2017).  PCP Dr. EUNICE POWER, [facility]    Physical Exam  Height:  70 in.    Weight:  210 lbs.     BMI:  30.24    Mental/HEENT/Cardio/Skin  The patient's general appearance is well developed, well nourished, no acute distress.  Orientation is alert and oriented x 3.  The patient's mood is normal.  A head exam revealed normocephalic/atraumatic.  An eye exam revealed pupils equal.  An ears, nose, and throat exam shows normal, no lesions or deformities.  Cardiovascular exam indicates Regular rate and rhythm.  Pulmonary exam shows normal air exchange, no labored breathing, or shortness of breath.  The patient's abdomen is soft nontender nondistended.  A skin exam shows normal temperature and color in the area of examination.  A lymphatic exam reveals no adenopathy in the area of examination.      Right Shoulder  Skin is normal.  There is other atrophy.  There is no effusion.  There is no warmth.  No erythema.  Lymphadenopathy is negative.  Right shoulder active ROM today shows: Elevation = 140; ER(side) = 70; ER(abd) = 60; IR(abd) = 60; IR(vert) = to waist; Abduction = 100.  Shoulder strength: Elevation = 4+/5; ER = 4/5; IR = 4+/5; ABD = 4/5.  Neer test is positive.  Pagan test is positive.  Arc of motion is positive.  Empty can test was positive.  Pulses are normal.  Normal sensation.  Capillary refill is normal.        Imaging/Diagnostic Studies  Right shoulder MRI shows I reviewed the MRI which shows a large retracted rotator cuff tear with some thinning and atrophy.    Impression  Right AC joint osteoarthritis  Right glenohumeral joint osteoarthritis  Right chronic full thickness rotator  cuff tear  Right rotator cuff arthropathy    Plan  We discussed a course of care and I recommend right reverse total shoulder arthroplasty.  I think the chance of success that a repair of the tendon is low based on his clinical exam and the appearance of the rotator cuff tear on MRI.  Reviewed reverse total shoulder arthroplasty with models and discussed the procedure in detail.    We discussed the benefits of surgical intervention, as well as alternative treatments.  Potential surgical risks and complications include but are not limited to DVT, infection, neurovascular injury, fracture, implant wear, failure, possible need for revision surgery, loss of motion, dislocation, limb length changes.  Sufficient opportunity was given to discuss the condition and treatment plan with the doctor, and all questions were answered for the patient.  Nonsurgical measures such as injections, medications, or physical therapy may not offer significant relief to this patient.

## 2017-04-13 ENCOUNTER — HOSPITAL ENCOUNTER (INPATIENT)
Facility: HOSPITAL | Age: 67
LOS: 1 days | Discharge: HOME | End: 2017-04-14
Attending: ORTHOPAEDIC SURGERY | Admitting: ORTHOPAEDIC SURGERY

## 2017-04-13 ENCOUNTER — ANESTHESIA EVENT (OUTPATIENT)
Dept: PERIOP | Facility: HOSPITAL | Age: 67
End: 2017-04-13

## 2017-04-13 ENCOUNTER — ANESTHESIA (OUTPATIENT)
Dept: PERIOP | Facility: HOSPITAL | Age: 67
End: 2017-04-13

## 2017-04-13 ENCOUNTER — APPOINTMENT (OUTPATIENT)
Dept: GENERAL RADIOLOGY | Facility: HOSPITAL | Age: 67
End: 2017-04-13

## 2017-04-13 PROBLEM — M25.311 ROTATOR CUFF INSUFFICIENCY OF RIGHT SHOULDER: Status: ACTIVE | Noted: 2017-04-13

## 2017-04-13 LAB
GLUCOSE BLDC GLUCOMTR-MCNC: 123 MG/DL (ref 70–130)
GLUCOSE BLDC GLUCOMTR-MCNC: 160 MG/DL (ref 70–130)

## 2017-04-13 PROCEDURE — 25010000002 ONDANSETRON PER 1 MG: Performed by: ANESTHESIOLOGY

## 2017-04-13 PROCEDURE — 25010000002 FENTANYL CITRATE (PF) 100 MCG/2ML SOLUTION: Performed by: ANESTHESIOLOGY

## 2017-04-13 PROCEDURE — 0RRJ00Z REPLACEMENT OF RIGHT SHOULDER JOINT WITH REVERSE BALL AND SOCKET SYNTHETIC SUBSTITUTE, OPEN APPROACH: ICD-10-PCS | Performed by: ORTHOPAEDIC SURGERY

## 2017-04-13 PROCEDURE — 25010000002 NEOSTIGMINE 10 MG/10ML SOLUTION: Performed by: ANESTHESIOLOGY

## 2017-04-13 PROCEDURE — 25010000002 ROPIVACAINE PER 1 MG: Performed by: ANESTHESIOLOGY

## 2017-04-13 PROCEDURE — 94799 UNLISTED PULMONARY SVC/PX: CPT

## 2017-04-13 PROCEDURE — 25010000002 MIDAZOLAM PER 1 MG: Performed by: ANESTHESIOLOGY

## 2017-04-13 PROCEDURE — 25010000002 DEXAMETHASONE PER 1 MG: Performed by: ANESTHESIOLOGY

## 2017-04-13 PROCEDURE — 25010000002 PROPOFOL 10 MG/ML EMULSION: Performed by: ANESTHESIOLOGY

## 2017-04-13 PROCEDURE — 73030 X-RAY EXAM OF SHOULDER: CPT

## 2017-04-13 PROCEDURE — C1776 JOINT DEVICE (IMPLANTABLE): HCPCS | Performed by: ORTHOPAEDIC SURGERY

## 2017-04-13 PROCEDURE — 82962 GLUCOSE BLOOD TEST: CPT

## 2017-04-13 PROCEDURE — 25010000003 CEFAZOLIN IN DEXTROSE 2-4 GM/100ML-% SOLUTION: Performed by: ORTHOPAEDIC SURGERY

## 2017-04-13 PROCEDURE — 25010000002 KETOROLAC TROMETHAMINE PER 15 MG: Performed by: ANESTHESIOLOGY

## 2017-04-13 PROCEDURE — 63710000001 INSULIN DETEMER PER 5 UNITS: Performed by: ORTHOPAEDIC SURGERY

## 2017-04-13 PROCEDURE — 25010000002 PHENYLEPHRINE PER 1 ML: Performed by: ANESTHESIOLOGY

## 2017-04-13 DEVICE — SCRW COMPR EQUINOXE LK 4.5X22MM: Type: IMPLANTABLE DEVICE | Site: SHOULDER | Status: FUNCTIONAL

## 2017-04-13 DEVICE — SCRW EQUINOXE TORQ DEFINE REV SHLDR KT: Type: IMPLANTABLE DEVICE | Site: SHOULDER | Status: FUNCTIONAL

## 2017-04-13 DEVICE — IMPLANTABLE DEVICE: Type: IMPLANTABLE DEVICE | Status: FUNCTIONAL

## 2017-04-13 DEVICE — LINER HUM EQUINOXE REV SHLDR 38 PLS0: Type: IMPLANTABLE DEVICE | Site: SHOULDER | Status: FUNCTIONAL

## 2017-04-13 DEVICE — IMPLANTABLE DEVICE: Type: IMPLANTABLE DEVICE | Site: SHOULDER | Status: FUNCTIONAL

## 2017-04-13 DEVICE — SCRW LK EQUINOXE GLENOSPHERE REV/SHLDR: Type: IMPLANTABLE DEVICE | Site: SHOULDER | Status: FUNCTIONAL

## 2017-04-13 DEVICE — SCRW COMPR EQUINOXE LK 4.5X18MM: Type: IMPLANTABLE DEVICE | Site: SHOULDER | Status: FUNCTIONAL

## 2017-04-13 DEVICE — PLT/JOINT GLEN EQUINOXE STD/POST: Type: IMPLANTABLE DEVICE | Site: SHOULDER | Status: FUNCTIONAL

## 2017-04-13 DEVICE — STEM HUM PRI EQUINOXE PRESSFIT 15MM: Type: IMPLANTABLE DEVICE | Site: SHOULDER | Status: FUNCTIONAL

## 2017-04-13 DEVICE — TRY HUM EQUINOXE ADPT REV SHLDR PLS5: Type: IMPLANTABLE DEVICE | Site: SHOULDER | Status: FUNCTIONAL

## 2017-04-13 RX ORDER — MORPHINE SULFATE 2 MG/ML
6 INJECTION, SOLUTION INTRAMUSCULAR; INTRAVENOUS
Status: DISCONTINUED | OUTPATIENT
Start: 2017-04-13 | End: 2017-04-14 | Stop reason: HOSPADM

## 2017-04-13 RX ORDER — ATORVASTATIN CALCIUM 10 MG/1
10 TABLET, FILM COATED ORAL DAILY
Status: DISCONTINUED | OUTPATIENT
Start: 2017-04-13 | End: 2017-04-14 | Stop reason: HOSPADM

## 2017-04-13 RX ORDER — HYDRALAZINE HYDROCHLORIDE 20 MG/ML
5 INJECTION INTRAMUSCULAR; INTRAVENOUS
Status: DISCONTINUED | OUTPATIENT
Start: 2017-04-13 | End: 2017-04-13 | Stop reason: HOSPADM

## 2017-04-13 RX ORDER — ONDANSETRON 4 MG/1
4 TABLET, ORALLY DISINTEGRATING ORAL EVERY 6 HOURS PRN
Status: DISCONTINUED | OUTPATIENT
Start: 2017-04-13 | End: 2017-04-14 | Stop reason: HOSPADM

## 2017-04-13 RX ORDER — ACETAMINOPHEN 325 MG/1
650 TABLET ORAL ONCE AS NEEDED
Status: DISCONTINUED | OUTPATIENT
Start: 2017-04-13 | End: 2017-04-13 | Stop reason: HOSPADM

## 2017-04-13 RX ORDER — PROMETHAZINE HYDROCHLORIDE 25 MG/1
25 TABLET ORAL ONCE AS NEEDED
Status: DISCONTINUED | OUTPATIENT
Start: 2017-04-13 | End: 2017-04-13 | Stop reason: HOSPADM

## 2017-04-13 RX ORDER — LANOLIN ALCOHOL/MO/W.PET/CERES
400 CREAM (GRAM) TOPICAL 2 TIMES DAILY
Status: DISCONTINUED | OUTPATIENT
Start: 2017-04-14 | End: 2017-04-14 | Stop reason: HOSPADM

## 2017-04-13 RX ORDER — TESTOSTERONE 12.5 MG/1.25G
25 GEL TOPICAL DAILY
Status: DISCONTINUED | OUTPATIENT
Start: 2017-04-13 | End: 2017-04-14 | Stop reason: HOSPADM

## 2017-04-13 RX ORDER — MIDAZOLAM HYDROCHLORIDE 1 MG/ML
1 INJECTION INTRAMUSCULAR; INTRAVENOUS
Status: DISCONTINUED | OUTPATIENT
Start: 2017-04-13 | End: 2017-04-13 | Stop reason: HOSPADM

## 2017-04-13 RX ORDER — SENNA AND DOCUSATE SODIUM 50; 8.6 MG/1; MG/1
2 TABLET, FILM COATED ORAL 2 TIMES DAILY
Status: DISCONTINUED | OUTPATIENT
Start: 2017-04-13 | End: 2017-04-14 | Stop reason: HOSPADM

## 2017-04-13 RX ORDER — FAMOTIDINE 10 MG/ML
20 INJECTION, SOLUTION INTRAVENOUS ONCE
Status: COMPLETED | OUTPATIENT
Start: 2017-04-13 | End: 2017-04-13

## 2017-04-13 RX ORDER — PROMETHAZINE HYDROCHLORIDE 25 MG/ML
6.25 INJECTION, SOLUTION INTRAMUSCULAR; INTRAVENOUS ONCE AS NEEDED
Status: DISCONTINUED | OUTPATIENT
Start: 2017-04-13 | End: 2017-04-13 | Stop reason: HOSPADM

## 2017-04-13 RX ORDER — CEFAZOLIN SODIUM 2 G/100ML
2 INJECTION, SOLUTION INTRAVENOUS EVERY 8 HOURS
Status: DISCONTINUED | OUTPATIENT
Start: 2017-04-13 | End: 2017-04-13 | Stop reason: SDUPTHER

## 2017-04-13 RX ORDER — BISACODYL 10 MG
10 SUPPOSITORY, RECTAL RECTAL DAILY PRN
Status: DISCONTINUED | OUTPATIENT
Start: 2017-04-13 | End: 2017-04-14 | Stop reason: HOSPADM

## 2017-04-13 RX ORDER — FENTANYL CITRATE 50 UG/ML
50 INJECTION, SOLUTION INTRAMUSCULAR; INTRAVENOUS
Status: DISCONTINUED | OUTPATIENT
Start: 2017-04-13 | End: 2017-04-13 | Stop reason: HOSPADM

## 2017-04-13 RX ORDER — MAGNESIUM HYDROXIDE 1200 MG/15ML
LIQUID ORAL AS NEEDED
Status: DISCONTINUED | OUTPATIENT
Start: 2017-04-13 | End: 2017-04-13 | Stop reason: HOSPADM

## 2017-04-13 RX ORDER — DIPHENHYDRAMINE HCL 25 MG
25 CAPSULE ORAL EVERY 6 HOURS PRN
Status: DISCONTINUED | OUTPATIENT
Start: 2017-04-13 | End: 2017-04-14 | Stop reason: HOSPADM

## 2017-04-13 RX ORDER — GLYCOPYRROLATE 0.2 MG/ML
INJECTION INTRAMUSCULAR; INTRAVENOUS AS NEEDED
Status: DISCONTINUED | OUTPATIENT
Start: 2017-04-13 | End: 2017-04-13 | Stop reason: SURG

## 2017-04-13 RX ORDER — HYDROMORPHONE HYDROCHLORIDE 1 MG/ML
0.5 INJECTION, SOLUTION INTRAMUSCULAR; INTRAVENOUS; SUBCUTANEOUS
Status: DISCONTINUED | OUTPATIENT
Start: 2017-04-13 | End: 2017-04-13 | Stop reason: HOSPADM

## 2017-04-13 RX ORDER — KETOROLAC TROMETHAMINE 30 MG/ML
INJECTION, SOLUTION INTRAMUSCULAR; INTRAVENOUS AS NEEDED
Status: DISCONTINUED | OUTPATIENT
Start: 2017-04-13 | End: 2017-04-13 | Stop reason: SURG

## 2017-04-13 RX ORDER — ROPIVACAINE HYDROCHLORIDE 5 MG/ML
INJECTION, SOLUTION EPIDURAL; INFILTRATION; PERINEURAL AS NEEDED
Status: DISCONTINUED | OUTPATIENT
Start: 2017-04-13 | End: 2017-04-13 | Stop reason: SURG

## 2017-04-13 RX ORDER — NALBUPHINE HCL 10 MG/ML
2 AMPUL (ML) INJECTION EVERY 4 HOURS PRN
Status: DISCONTINUED | OUTPATIENT
Start: 2017-04-13 | End: 2017-04-13 | Stop reason: HOSPADM

## 2017-04-13 RX ORDER — SODIUM CHLORIDE, SODIUM LACTATE, POTASSIUM CHLORIDE, CALCIUM CHLORIDE 600; 310; 30; 20 MG/100ML; MG/100ML; MG/100ML; MG/100ML
9 INJECTION, SOLUTION INTRAVENOUS CONTINUOUS
Status: DISCONTINUED | OUTPATIENT
Start: 2017-04-13 | End: 2017-04-14 | Stop reason: HOSPADM

## 2017-04-13 RX ORDER — DIPHENHYDRAMINE HYDROCHLORIDE 50 MG/ML
12.5 INJECTION INTRAMUSCULAR; INTRAVENOUS
Status: DISCONTINUED | OUTPATIENT
Start: 2017-04-13 | End: 2017-04-13 | Stop reason: HOSPADM

## 2017-04-13 RX ORDER — NALOXONE HCL 0.4 MG/ML
0.4 VIAL (ML) INJECTION
Status: DISCONTINUED | OUTPATIENT
Start: 2017-04-13 | End: 2017-04-14 | Stop reason: HOSPADM

## 2017-04-13 RX ORDER — PROPOFOL 10 MG/ML
VIAL (ML) INTRAVENOUS AS NEEDED
Status: DISCONTINUED | OUTPATIENT
Start: 2017-04-13 | End: 2017-04-13 | Stop reason: SURG

## 2017-04-13 RX ORDER — ACETAMINOPHEN 325 MG/1
650 TABLET ORAL ONCE
Status: COMPLETED | OUTPATIENT
Start: 2017-04-13 | End: 2017-04-13

## 2017-04-13 RX ORDER — DIAZEPAM 5 MG/1
5 TABLET ORAL EVERY 6 HOURS PRN
Status: DISCONTINUED | OUTPATIENT
Start: 2017-04-13 | End: 2017-04-14 | Stop reason: HOSPADM

## 2017-04-13 RX ORDER — NEOSTIGMINE METHYLSULFATE 1 MG/ML
INJECTION, SOLUTION INTRAVENOUS AS NEEDED
Status: DISCONTINUED | OUTPATIENT
Start: 2017-04-13 | End: 2017-04-13 | Stop reason: SURG

## 2017-04-13 RX ORDER — OXCARBAZEPINE 300 MG/1
600 TABLET, FILM COATED ORAL
Status: DISCONTINUED | OUTPATIENT
Start: 2017-04-14 | End: 2017-04-14 | Stop reason: HOSPADM

## 2017-04-13 RX ORDER — NALBUPHINE HCL 10 MG/ML
10 AMPUL (ML) INJECTION EVERY 4 HOURS PRN
Status: DISCONTINUED | OUTPATIENT
Start: 2017-04-13 | End: 2017-04-13 | Stop reason: HOSPADM

## 2017-04-13 RX ORDER — DEXAMETHASONE SODIUM PHOSPHATE 4 MG/ML
INJECTION, SOLUTION INTRA-ARTICULAR; INTRALESIONAL; INTRAMUSCULAR; INTRAVENOUS; SOFT TISSUE AS NEEDED
Status: DISCONTINUED | OUTPATIENT
Start: 2017-04-13 | End: 2017-04-13 | Stop reason: SURG

## 2017-04-13 RX ORDER — NALOXONE HCL 0.4 MG/ML
0.4 VIAL (ML) INJECTION AS NEEDED
Status: DISCONTINUED | OUTPATIENT
Start: 2017-04-13 | End: 2017-04-13 | Stop reason: HOSPADM

## 2017-04-13 RX ORDER — ASPIRIN 325 MG
325 TABLET, DELAYED RELEASE (ENTERIC COATED) ORAL DAILY
Status: DISCONTINUED | OUTPATIENT
Start: 2017-04-14 | End: 2017-04-14 | Stop reason: HOSPADM

## 2017-04-13 RX ORDER — ROCURONIUM BROMIDE 10 MG/ML
INJECTION, SOLUTION INTRAVENOUS AS NEEDED
Status: DISCONTINUED | OUTPATIENT
Start: 2017-04-13 | End: 2017-04-13 | Stop reason: SURG

## 2017-04-13 RX ORDER — SODIUM CHLORIDE 0.9 % (FLUSH) 0.9 %
1-10 SYRINGE (ML) INJECTION AS NEEDED
Status: DISCONTINUED | OUTPATIENT
Start: 2017-04-13 | End: 2017-04-13 | Stop reason: HOSPADM

## 2017-04-13 RX ORDER — ACETAMINOPHEN 650 MG/1
650 SUPPOSITORY RECTAL ONCE AS NEEDED
Status: DISCONTINUED | OUTPATIENT
Start: 2017-04-13 | End: 2017-04-13 | Stop reason: HOSPADM

## 2017-04-13 RX ORDER — SODIUM CHLORIDE 450 MG/100ML
75 INJECTION, SOLUTION INTRAVENOUS CONTINUOUS
Status: DISCONTINUED | OUTPATIENT
Start: 2017-04-13 | End: 2017-04-14 | Stop reason: HOSPADM

## 2017-04-13 RX ORDER — LIDOCAINE HYDROCHLORIDE 10 MG/ML
5 INJECTION, SOLUTION EPIDURAL; INFILTRATION; INTRACAUDAL; PERINEURAL ONCE
Status: DISCONTINUED | OUTPATIENT
Start: 2017-04-13 | End: 2017-04-13 | Stop reason: HOSPADM

## 2017-04-13 RX ORDER — ROPIVACAINE HYDROCHLORIDE 2 MG/ML
INJECTION, SOLUTION EPIDURAL; INFILTRATION; PERINEURAL AS NEEDED
Status: DISCONTINUED | OUTPATIENT
Start: 2017-04-13 | End: 2017-04-13 | Stop reason: SURG

## 2017-04-13 RX ORDER — MIDAZOLAM HYDROCHLORIDE 1 MG/ML
2 INJECTION INTRAMUSCULAR; INTRAVENOUS
Status: DISCONTINUED | OUTPATIENT
Start: 2017-04-13 | End: 2017-04-13 | Stop reason: HOSPADM

## 2017-04-13 RX ORDER — MELATONIN
1000 2 TIMES DAILY
Status: DISCONTINUED | OUTPATIENT
Start: 2017-04-13 | End: 2017-04-14 | Stop reason: HOSPADM

## 2017-04-13 RX ORDER — OXYCODONE HYDROCHLORIDE AND ACETAMINOPHEN 5; 325 MG/1; MG/1
2 TABLET ORAL EVERY 4 HOURS PRN
Status: DISCONTINUED | OUTPATIENT
Start: 2017-04-13 | End: 2017-04-14 | Stop reason: HOSPADM

## 2017-04-13 RX ORDER — PROMETHAZINE HYDROCHLORIDE 25 MG/1
25 SUPPOSITORY RECTAL ONCE AS NEEDED
Status: DISCONTINUED | OUTPATIENT
Start: 2017-04-13 | End: 2017-04-13 | Stop reason: HOSPADM

## 2017-04-13 RX ORDER — ONDANSETRON 2 MG/ML
INJECTION INTRAMUSCULAR; INTRAVENOUS AS NEEDED
Status: DISCONTINUED | OUTPATIENT
Start: 2017-04-13 | End: 2017-04-13 | Stop reason: SURG

## 2017-04-13 RX ORDER — OXYCODONE HYDROCHLORIDE AND ACETAMINOPHEN 5; 325 MG/1; MG/1
1 TABLET ORAL ONCE AS NEEDED
Status: DISCONTINUED | OUTPATIENT
Start: 2017-04-13 | End: 2017-04-13 | Stop reason: HOSPADM

## 2017-04-13 RX ORDER — ONDANSETRON 2 MG/ML
4 INJECTION INTRAMUSCULAR; INTRAVENOUS EVERY 6 HOURS PRN
Status: DISCONTINUED | OUTPATIENT
Start: 2017-04-13 | End: 2017-04-14 | Stop reason: HOSPADM

## 2017-04-13 RX ORDER — ONDANSETRON 4 MG/1
4 TABLET, FILM COATED ORAL EVERY 6 HOURS PRN
Status: DISCONTINUED | OUTPATIENT
Start: 2017-04-13 | End: 2017-04-14 | Stop reason: HOSPADM

## 2017-04-13 RX ADMIN — ACETAMINOPHEN 650 MG: 325 TABLET ORAL at 12:55

## 2017-04-13 RX ADMIN — FENTANYL CITRATE 25 MCG: 50 INJECTION INTRAMUSCULAR; INTRAVENOUS at 16:25

## 2017-04-13 RX ADMIN — ROCURONIUM BROMIDE 40 MG: 10 INJECTION INTRAVENOUS at 14:57

## 2017-04-13 RX ADMIN — PHENYLEPHRINE HYDROCHLORIDE 100 MCG: 10 INJECTION INTRAVENOUS at 15:16

## 2017-04-13 RX ADMIN — PROPOFOL 180 MG: 10 INJECTION, EMULSION INTRAVENOUS at 14:57

## 2017-04-13 RX ADMIN — ROPIVACAINE HYDROCHLORIDE 20 ML: 2 INJECTION, SOLUTION EPIDURAL; INFILTRATION at 13:06

## 2017-04-13 RX ADMIN — ROPIVACAINE HYDROCHLORIDE 20 ML: 5 INJECTION, SOLUTION EPIDURAL; INFILTRATION; PERINEURAL at 14:35

## 2017-04-13 RX ADMIN — FENTANYL CITRATE 50 MCG: 50 INJECTION INTRAMUSCULAR; INTRAVENOUS at 13:01

## 2017-04-13 RX ADMIN — CEFAZOLIN SODIUM 2 G: 2 INJECTION, SOLUTION INTRAVENOUS at 23:48

## 2017-04-13 RX ADMIN — ATORVASTATIN CALCIUM 10 MG: 10 TABLET, FILM COATED ORAL at 21:40

## 2017-04-13 RX ADMIN — VITAMIN D, TAB 1000IU (100/BT) 1000 UNITS: 25 TAB at 20:02

## 2017-04-13 RX ADMIN — GLYCOPYRROLATE 0.2 MG: 0.2 INJECTION INTRAMUSCULAR; INTRAVENOUS at 16:32

## 2017-04-13 RX ADMIN — DEXAMETHASONE SODIUM PHOSPHATE 4 MG: 4 INJECTION, SOLUTION INTRAMUSCULAR; INTRAVENOUS at 13:06

## 2017-04-13 RX ADMIN — FENTANYL CITRATE 50 MCG: 50 INJECTION INTRAMUSCULAR; INTRAVENOUS at 13:04

## 2017-04-13 RX ADMIN — FAMOTIDINE 20 MG: 10 INJECTION, SOLUTION INTRAVENOUS at 12:58

## 2017-04-13 RX ADMIN — SODIUM CHLORIDE, POTASSIUM CHLORIDE, SODIUM LACTATE AND CALCIUM CHLORIDE 9 ML/HR: 600; 310; 30; 20 INJECTION, SOLUTION INTRAVENOUS at 12:35

## 2017-04-13 RX ADMIN — SODIUM CHLORIDE 75 ML/HR: 4.5 INJECTION, SOLUTION INTRAVENOUS at 23:48

## 2017-04-13 RX ADMIN — METFORMIN HYDROCHLORIDE 500 MG: 500 TABLET ORAL at 20:02

## 2017-04-13 RX ADMIN — PHENYLEPHRINE HYDROCHLORIDE 100 MCG: 10 INJECTION INTRAVENOUS at 15:05

## 2017-04-13 RX ADMIN — OXYCODONE HYDROCHLORIDE AND ACETAMINOPHEN 2 TABLET: 5; 325 TABLET ORAL at 21:23

## 2017-04-13 RX ADMIN — CEFAZOLIN SODIUM 2 G: 2 INJECTION, SOLUTION INTRAVENOUS at 15:00

## 2017-04-13 RX ADMIN — FENTANYL CITRATE 50 MCG: 50 INJECTION INTRAMUSCULAR; INTRAVENOUS at 16:15

## 2017-04-13 RX ADMIN — INSULIN DETEMIR 10 UNITS: 100 INJECTION, SOLUTION SUBCUTANEOUS at 22:50

## 2017-04-13 RX ADMIN — KETOROLAC TROMETHAMINE 30 MG: 30 INJECTION, SOLUTION INTRAMUSCULAR; INTRAVENOUS at 16:32

## 2017-04-13 RX ADMIN — EPHEDRINE SULFATE 5 MG: 50 INJECTION INTRAMUSCULAR; INTRAVENOUS; SUBCUTANEOUS at 16:11

## 2017-04-13 RX ADMIN — SODIUM CHLORIDE, POTASSIUM CHLORIDE, SODIUM LACTATE AND CALCIUM CHLORIDE: 600; 310; 30; 20 INJECTION, SOLUTION INTRAVENOUS at 15:45

## 2017-04-13 RX ADMIN — ONDANSETRON 4 MG: 2 INJECTION INTRAMUSCULAR; INTRAVENOUS at 16:21

## 2017-04-13 RX ADMIN — NEOSTIGMINE METHYLSULFATE 2 MG: 1 INJECTION INTRAVENOUS at 16:32

## 2017-04-13 RX ADMIN — PHENYLEPHRINE HYDROCHLORIDE 100 MCG: 10 INJECTION INTRAVENOUS at 15:25

## 2017-04-13 RX ADMIN — MUPIROCIN CALCIUM: 20 OINTMENT TOPICAL at 21:40

## 2017-04-13 RX ADMIN — GLYCOPYRROLATE 0.2 MG: 0.2 INJECTION INTRAMUSCULAR; INTRAVENOUS at 15:16

## 2017-04-13 RX ADMIN — MIDAZOLAM 2 MG: 1 INJECTION INTRAMUSCULAR; INTRAVENOUS at 13:02

## 2017-04-13 RX ADMIN — FENTANYL CITRATE 25 MCG: 50 INJECTION INTRAMUSCULAR; INTRAVENOUS at 16:21

## 2017-04-13 RX ADMIN — PHENYLEPHRINE HYDROCHLORIDE 100 MCG: 10 INJECTION INTRAVENOUS at 15:35

## 2017-04-13 RX ADMIN — DOCUSATE SODIUM,SENNOSIDES 2 TABLET: 50; 8.6 TABLET, FILM COATED ORAL at 20:02

## 2017-04-13 NOTE — ANESTHESIA POSTPROCEDURE EVALUATION
Patient: Andrea Fam    Procedure Summary     Date Anesthesia Start Anesthesia Stop Room / Location    04/13/17 [id number]  [facility]  /  [facility]       Procedure Diagnosis Surgeon Provider    RT TOTAL SHOULDER REVERSE ARTHROPLASTY (Right Shoulder) No diagnosis on file. MD Hakeem Cameron MD          Anesthesia Type: general, regional  Last vitals  /77 (04/13/17 1800)    Temp 36.8 °C (98.2 °F) (04/13/17 1800)    Pulse 77 (04/13/17 1800)   Resp 18 (04/13/17 1800)    SpO2 97 % (04/13/17 1800)      Post Anesthesia Care and Evaluation    Patient location during evaluation: bedside  Patient participation: complete - patient participated  Level of consciousness: awake  Pain management: adequate  Airway patency: patent  Anesthetic complications: No anesthetic complications    Cardiovascular status: acceptable  Respiratory status: acceptable  Hydration status: acceptable

## 2017-04-13 NOTE — NURSING NOTE
Sensation and ROM present in RUE after regional block. Dr Kilpatrick informed. Dr Kilpatrick @ [facility] assessing pt at this time.

## 2017-04-13 NOTE — OP NOTE
Date of Procedure:  04/13/2017     PREOPERATIVE DIAGNOSIS: Rotator cuff arthropathy, right shoulder.      POSTOPERATIVE DIAGNOSIS: Rotator cuff arthropathy, right shoulder.      PROCEDURE PERFORMED: Right reverse total shoulder arthroplasty.      SURGEON: Jamal Prince MD      ASSISTANT: Shell Howard PA-C     NOTE: A surgical assistant, Shell Howard PA-C, was utilized as a medical necessity and was present through the entire procedure, assisting in all aspects of complex reverse shoulder arthroplasty, reducing overall operative time and morbidity for the patient.     ANESTHESIA: General with interscalene block.     COMPLICATIONS: None.     IMPLANT: [employer] Equinoxe reverse total shoulder system.     IV ANTIBIOTIC: Kefzol given IV.     DESCRIPTION OF PROCEDURE: The patient was taken to the operative suite. After adequate general and interscalene block anesthesia were established, the right upper extremity was prepped and draped in the usual fashion with the patient in a beach chair position. An anterior incision was made through skin and subcutaneous tissues. Deltopectoral interval was entered. Cephalic vein was preserved and the conjoined tendon was reflected medially. The subscapularis was divided and a soft tissue tenodesis performed in the bicipital tendon groove, and the proximal severely torn and degenerative biceps tendon was tenotomized. The rotator cuff was completely torn and retracted. Retractors were placed, and then the inferior capsule was released off the humerus, and then retractors were placed to protect neurovascular structures. A head cut with an external cutting guide was made at a 20° retroverted angle. Excess osteophytes excised. I hand-reamed and broached to a #15 stem. Good fit and purchase were noted. Trialing was performed followed by pulse lavage irrigation and drying the bone in the proximal humerus, and then subsequent implantation of the press-fit Equinoxe primary stem. Good  firm fixation was noted. I then did capsule releases under direct visualization along the inferior glenoid, placed retractors, and then excised the labral attachments and bicipital tendon stump and the anterior capsule, exposing the glenoid. The glenoid was severely damaged and inferiorly he had a great deal of fibrous tissue and complete wear down to bone. This tissue was excised so I could clearly see bone. He did have some posterior/inferior erosion noted. I placed the guide pin and subsequently reamed some of the high side down just slightly so that I could restore some normal version, and then a central hole was drilled for the cage. I pulse-lavaged and dried the bone, and then placed bone graft from the humeral head into the cage of the Equinoxe glenoid. I then press-fit this baseplate into position, and then held with 4 compression screws and locking caps, gaining good firm fixation of the 38 mm baseplate. I then elected to trial a +4/38 Glenosphere. Because of the medialization of the glenoid, I wanted to restore some of the lateral positioning of the shoulder, and then I trialed various trays and polys, and then chose the +5 tray with 0 polyethylene, which gave good overall range of motion and stability and no significant impingement. I then removed all trial components and pulse-lavaged with antibiotic solution. I placed a 38 mm Glenosphere that was +4 thickness and held with a compression screw on the baseplate. I then exposed the humerus and placed a +5 tray and held with a torque limiting screw and then placed a 0 polyethylene and reduced the shoulder. Good firm fixation was noted in stability and rotation. I vigorously irrigated with antibiotic solution and closed the deltopectoral interval with 0 Vicryl suture. The subcutaneous tissue was closed with 2-0 Vicryl and skin was closed with staples. A sterile compression dressing and sling were applied. He was awoken and taken to the postanesthetic  recovery unit in good condition.          Jamal Prince M.D.*  SDK:[email or web address]  D:   04/13/2017 16:48:05  T:   04/13/2017 19:05:51  Job ID:   [id number]  Document ID:   [id number]  cc:

## 2017-04-13 NOTE — BRIEF OP NOTE
TOTAL SHOULDER REVERSE ARTHROPLASTY  Procedure Note    Andrea Fam  4/13/2017    Pre-op Diagnosis:   Right shoulder rotator cuff arthropathy  Post-op Diagnosis:     same  Procedure/CPT® Codes:      Procedure(s):  RT TOTAL SHOULDER REVERSE ARTHROPLASTY    Surgeon(s):  Jamal Prince MD    Anesthesia: General    Staff:   Circulator: Maria L Weinstein RN; Arvin Lyn RN  Scrub Person: Ginny Mclain  Vendor Representative: Prakash Fernando; Madan Prince  Assistant: LILIANA Reyez    Estimated Blood Loss: * No values recorded between 4/13/2017  2:41 PM and 4/13/2017  4:37 PM *  Urine Voided: * No values recorded between 4/13/2017  2:41 PM and 4/13/2017  4:37 PM *    Specimens:                * No specimens in log *      Drains:           Findings:     Complications: none      Jamal Prince MD     Date: 4/13/2017  Time: 4:45 PM

## 2017-04-13 NOTE — PLAN OF CARE
Problem: Patient Care Overview (Adult)  Goal: Plan of Care Review  Outcome: Ongoing (interventions implemented as appropriate)    04/13/17 2141   Coping/Psychosocial Response Interventions   Plan Of Care Reviewed With patient   Patient Care Overview   Progress progress toward functional goals as expected   Outcome Evaluation   Outcome Summary/Follow up Plan pt admitted to floor from osc pacu at this time. no c/o pain. block in effect. vss. family at bedside.        Goal: Adult Individualization and Mutuality  Outcome: Ongoing (interventions implemented as appropriate)  Goal: Discharge Needs Assessment  Outcome: Ongoing (interventions implemented as appropriate)    Problem: Perioperative Period (Adult)  Goal: Signs and Symptoms of Listed Potential Problems Will be Absent or Manageable (Perioperative Period)  Outcome: Ongoing (interventions implemented as appropriate)    Problem: Fall Risk (Adult)  Goal: Identify Related Risk Factors and Signs and Symptoms  Outcome: Ongoing (interventions implemented as appropriate)  Goal: Absence of Falls  Outcome: Ongoing (interventions implemented as appropriate)    Problem: Mobility, Physical Impaired (Adult)  Goal: Identify Related Risk Factors and Signs and Symptoms  Outcome: Ongoing (interventions implemented as appropriate)  Goal: Enhanced Mobility Skills  Outcome: Ongoing (interventions implemented as appropriate)  Goal: Enhanced Functionality Ability  Outcome: Ongoing (interventions implemented as appropriate)

## 2017-04-13 NOTE — ANESTHESIA PROCEDURE NOTES
Airway  Airway not difficult    General Information and Staff    Patient location during procedure: OR  Anesthesiologist: RENDER, DANA RAY    Indications and Patient Condition  Indications for airway management: airway protection    Preoxygenated: yes  MILS maintained throughout  Mask difficulty assessment: 1 - vent by mask    Final Airway Details  Final airway type: endotracheal airway      Successful airway: ETT  Cuffed: yes   Successful intubation technique: direct laryngoscopy  Blade: Fernando  Blade size: #4  ETT size: 8.0 mm  Cormack-Lehane Classification: grade IIa - partial view of glottis  Number of attempts at approach: 1

## 2017-04-13 NOTE — ANESTHESIA PREPROCEDURE EVALUATION
Anesthesia Evaluation        Airway   Mallampati: II  TM distance: >3 FB  Neck ROM: full  no difficulty expected  Dental - normal exam     Pulmonary - normal exam   (+) sleep apnea on CPAP,   Cardiovascular   Exercise tolerance: good (4-7 METS)    ECG reviewed  Rhythm: regular    (-) murmur      Neuro/Psych- negative ROS  GI/Hepatic/Renal/Endo    (+)  diabetes mellitus type 2 well controlled,     Musculoskeletal     (+) arthralgias,   Abdominal  - normal exam   Substance History      OB/GYN          Other                                    Anesthesia Plan    ASA 3     general and regional   (  D/W R&B of GA including but not limited to: heart, lung, liver, kidney, neurologic problems, positioning injuries, dental damage, corneal abrasion and TMJ.  .Risks of peripheral nerve block were discussed with patient including but not limited to: inadequate block, bleeding, infection, persistent numbness or weakness, nerve damage, painful dysasthesia and need to protect limb while numb.)  intravenous induction   Anesthetic plan and risks discussed with patient.

## 2017-04-13 NOTE — ANESTHESIA PROCEDURE NOTES
Peripheral Block    Patient location during procedure: pre-op  Start time: 4/13/2017 1:01 PM  Stop time: 4/13/2017 1:09 PM  Reason for block: procedure for pain, at surgeon's request and post-op pain management  Performed by  Anesthesiologist: DANA HAHN  Preanesthetic Checklist  Completed: patient identified, site marked, surgical consent, pre-op evaluation, timeout performed, IV checked, risks and benefits discussed and monitors and equipment checked  Peripheral Block Prep:  Sterile barriers:gloves  Prep: ChloraPrep  Patient monitoring: blood pressure monitoring, continuous pulse oximetry and EKG  Peripheral Procedure  Sedation:yes  Guidance:ultrasound guided  Images:still images obtained    Laterality:right  Block Type:interscalene  Injection Technique:single-shot  Needle Type:short-bevel  Needle Gauge:22 G  ULTRASOUND INTERPRETATION.  Using ultrasound guidance a 22 G gauge needle was placed in close proximity to the brachial plexus nerve, at which point, under ultrasound guidance anesthetic was injected in the area of the nerve and spread of the anesthesia was seen on ultrasound in close proximity thereto.  There were no abnormalities seen on ultrasound; a digital image was taken; and the patient tolerated the procedure with no complications.   Medications  Analgesia: Dexamethasone 4mg.  Comment:Dexamethasone 4mg added to injectate  Local Injected:ropivacaine 0.2% Local Amount Injected:20mL  Post Assessment  Injection Assessment: negative aspiration for heme, no paresthesia on injection and incremental injection  Patient Tolerance:comfortable throughout block  Complications:no

## 2017-04-14 VITALS
TEMPERATURE: 98.6 F | RESPIRATION RATE: 16 BRPM | SYSTOLIC BLOOD PRESSURE: 109 MMHG | OXYGEN SATURATION: 91 % | DIASTOLIC BLOOD PRESSURE: 60 MMHG | HEART RATE: 76 BPM | BODY MASS INDEX: 29.78 KG/M2 | HEIGHT: 70 IN | WEIGHT: 208 LBS

## 2017-04-14 LAB
ANION GAP SERPL CALCULATED.3IONS-SCNC: 11.2 MMOL/L
BUN BLD-MCNC: 9 MG/DL (ref 8–23)
BUN/CREAT SERPL: 12.7 (ref 7–25)
CALCIUM SPEC-SCNC: 8.8 MG/DL (ref 8.6–10.5)
CHLORIDE SERPL-SCNC: 100 MMOL/L (ref 98–107)
CO2 SERPL-SCNC: 26.8 MMOL/L (ref 22–29)
CREAT BLD-MCNC: 0.71 MG/DL (ref 0.76–1.27)
GFR SERPL CREATININE-BSD FRML MDRD: 110 ML/MIN/1.73
GLUCOSE BLD-MCNC: 130 MG/DL (ref 65–99)
GLUCOSE BLDC GLUCOMTR-MCNC: 138 MG/DL (ref 70–130)
GLUCOSE BLDC GLUCOMTR-MCNC: 216 MG/DL (ref 70–130)
HCT VFR BLD AUTO: 36.2 % (ref 40.4–52.2)
HGB BLD-MCNC: 12.2 G/DL (ref 13.7–17.6)
POTASSIUM BLD-SCNC: 4.2 MMOL/L (ref 3.5–5.2)
SODIUM BLD-SCNC: 138 MMOL/L (ref 136–145)

## 2017-04-14 PROCEDURE — 85014 HEMATOCRIT: CPT | Performed by: ORTHOPAEDIC SURGERY

## 2017-04-14 PROCEDURE — 25010000003 CEFAZOLIN IN DEXTROSE 2-4 GM/100ML-% SOLUTION: Performed by: ORTHOPAEDIC SURGERY

## 2017-04-14 PROCEDURE — 82962 GLUCOSE BLOOD TEST: CPT

## 2017-04-14 PROCEDURE — 80048 BASIC METABOLIC PNL TOTAL CA: CPT | Performed by: ORTHOPAEDIC SURGERY

## 2017-04-14 PROCEDURE — 85018 HEMOGLOBIN: CPT | Performed by: ORTHOPAEDIC SURGERY

## 2017-04-14 RX ORDER — OXYCODONE HYDROCHLORIDE AND ACETAMINOPHEN 5; 325 MG/1; MG/1
2 TABLET ORAL EVERY 4 HOURS PRN
Qty: 40 TABLET | Refills: 0 | Status: SHIPPED | OUTPATIENT
Start: 2017-04-14 | End: 2017-06-14

## 2017-04-14 RX ADMIN — ASPIRIN 325 MG: 325 TABLET, DELAYED RELEASE ORAL at 08:24

## 2017-04-14 RX ADMIN — CEFAZOLIN SODIUM 2 G: 2 INJECTION, SOLUTION INTRAVENOUS at 08:25

## 2017-04-14 RX ADMIN — VITAMIN D, TAB 1000IU (100/BT) 1000 UNITS: 25 TAB at 08:24

## 2017-04-14 RX ADMIN — OXCARBAZEPINE 600 MG: 300 TABLET, FILM COATED ORAL at 05:03

## 2017-04-14 RX ADMIN — OXYCODONE HYDROCHLORIDE AND ACETAMINOPHEN 2 TABLET: 5; 325 TABLET ORAL at 12:16

## 2017-04-14 RX ADMIN — DOCUSATE SODIUM,SENNOSIDES 2 TABLET: 50; 8.6 TABLET, FILM COATED ORAL at 08:24

## 2017-04-14 RX ADMIN — ACETAMINOPHEN 400 MCG: 400 TABLET ORAL at 08:24

## 2017-04-14 RX ADMIN — OXYCODONE HYDROCHLORIDE AND ACETAMINOPHEN 2 TABLET: 5; 325 TABLET ORAL at 08:25

## 2017-04-14 RX ADMIN — METFORMIN HYDROCHLORIDE 500 MG: 500 TABLET ORAL at 08:24

## 2017-04-14 RX ADMIN — ATORVASTATIN CALCIUM 10 MG: 10 TABLET, FILM COATED ORAL at 08:24

## 2017-04-14 NOTE — DISCHARGE SUMMARY
Total Shoulder Joint Replacement Discharge Instructions:    I. ACTIVITIES:  1. Exercises:  ? Complete exercise program as taught by the hospital physical therapist 2 times per day  ? Wear sling when in bed and when out of bed (except when doing exercises)  ? Exercise program will be advanced by the physical therapist  ? During the day be up ambulating every 2 hours (while awake) for short distances  ? Complete the ankle pump exercises at least 10 times per hour (while awake)  ? Elevate operative arm when in bed and for at least 30 minutes during the day.   ? Use cold packs 20-30 minutes approximately 5 times per day. This should be done before and after completing your exercises and at any time you are experiencing pain/ stiffness in your operative extremity.      2. Activities of Daily Living:  ? No tub baths, hot tubs, or swimming pools for  2 weeks  ? May shower and let water run over the incision on post-operative day #5 if no drainage. Do not scrub or rub the incision. Simply let the water run over the incision and pat dry.    II. Restrictions  ? No pushing, pulling, lifting, or weight bearing on operative extremity.  ? Continue to follow shoulder precautions as instructed by hospital physical therapist  ? Your surgeon will discuss with you when you will be able to drive again.  ? First week stay inside on even terrain. May go up and down stairs one stair at a time utilizing the hand rail  ? After one week, you may venture outside.    III. Precautions:  ? Everyone that comes near you should wash their hands  ? No elective dental, genital-urinary, or colon procedures or surgical procedures for 12 weeks after surgery unless absolutely necessary.  ?  If dental work or surgical procedure is deemed absolutely necessary, you will need to contact your surgeon as you will need to take antibiotics 1 hour prior to any dental work (including teeth cleanings).  ? Please discuss with your surgeon prophylactic antibiotics as  the length of time this intervention will be necessary for you varies with each patient’s health history and situation.  ? Avoid sick people. If you must be around someone who is ill, they should wear a mask.  ? Avoid visits to the Emergency Room or Urgent Care unless you are having a life threatening event.     IV. INCISION CARE:  ? Wash your hands prior to dressing changes  ? Change the dressing as needed to keep incision clean and dry. Utilize dry gauze and paper tape. Avoid touching the side of the gauze that goes against the incision with your hands.  ? No creams or ointments to the incision  ? May remove dressing once the incision is free of drainage  ? Do not touch or pick at the incision  ? Check incision every day and notify surgeon immediately if any of the following signs or symptoms are noted:  o Increase in redness  o Increase in swelling around the incision and of the entire extremity  o Increase in pain  o Drainage oozing from the incision  o Pulling apart of the edges of the incision  o Increase in overall body temperature (greater than 100.5 degrees)  ? Your surgeon will instruct you regarding suture or staple removal    V. Medications:     1. Stool Softeners: You will be at greater risk of constipation after surgery due to being less mobile and the pain medications.   ? Take stool softeners as instructed by your surgeon while on pain medications. Over the counter Colace 100 mg 1-2 capsules twice daily.   ? If stools become too loose or too frequent, please decreases the dosage or stop the stool softener.  ? If constipation occurs despite use of stool softeners, you are to continue the stool softeners and add a laxative (Milk of Magnesia 1 ounce daily as needed)  ? Drink plenty of fluids, and eat fruits and vegetables during your recovery time    2. Pain Medications utilized after surgery are narcotics and the law requires that the following information be given to all patients that are prescribed  narcotics:  ? CLASSIFICATION: Pain medications are called Opioids and are narcotics  ? LEGALITIES: It is illegal to share narcotics with others and to drive within 24 hours of taking narcotics  ? POTENTIAL SIDE EFFECTS: Potential side effects of opioids include: nausea, vomiting, itching, dizziness, drowsiness, dry mouth, constipation, and difficulty urinating.  ? POTENTIAL ADVERSE EFFECTS:   o Opioid tolerance can develop with use of pain medications and this simply means that it requires more and more of the medication to control pain; however, this is seen more in patients that use opioids for longer periods of time.  o Opioid dependence can develop with use of Opioids and this simply means that to stop the medication can cause withdrawal symptoms; however, this is seen with patients that use Opioids for longer periods of time.  o Opioid addiction can develop with use of Opioids and the incidence of this is very unlikely in patients who take the medications as ordered and stop the medications as instructed.  o Opioid overdose can be dangerous, but is unlikely when the medication is taken as ordered and stopped when ordered. It is important not to mix opioids with alcohol or with and type of sedative such as Benadryl as this can lead to over sedation and respiratory difficulty.  ? DOSAGE:   o Pain medications will need to be taken consistently for the first week to decrease pain and promote adequate pain relief and participation in physical therapy.  o After the initial surgical pain begins to resolve, you may begin to decrease the pain medication. By the end of 6 weeks, you should be off of pain medications.  o Refills will not be given by the office during evening hours, on weekends, or after 6 weeks post-op.  o To seek refills on pain medications during the initial 6 week post-operative period, you must call the office 48 hours in advance to request the refill. The office will then notify you when to   the prescription. DO NOT wait until you are out of the medication to request a refill.    V. FOLLOW-UP VISITS:  ? You will need to follow up in the office with your surgeon in  2 weeks. Please call this number ______  to schedule this appointment.  ? You will need to follow up with your primary care physician in 4 weeks.  ? If you have any concerns or suspected complications prior to your follow up visit, please call your surgeons office. Do not wait until your appointment time if you suspect complications. These will need to be addressed in the office promptly.

## 2017-04-14 NOTE — DISCHARGE INSTRUCTIONS
Total Shoulder Joint Replacement Discharge Instructions:     I. ACTIVITIES:  1. Exercises:  · Complete exercise program as taught by the hospital physical therapist 2 times per day  · Wear sling when in bed and when out of bed (except when doing exercises)  · Exercise program will be advanced by the physical therapist  · During the day be up ambulating every 2 hours (while awake) for short distances  · Complete the ankle pump exercises at least 10 times per hour (while awake)  · Elevate operative arm when in bed and for at least 30 minutes during the day.   · Use cold packs 20-30 minutes approximately 5 times per day. This should be done before and after completing your exercises and at any time you are experiencing pain/ stiffness in your operative extremity.        2. Activities of Daily Living:  · No tub baths, hot tubs, or swimming pools for 2 weeks  · May shower and let water run over the incision on post-operative day #5 if no drainage. Do not scrub or rub the incision. Simply let the water run over the incision and pat dry.     II. Restrictions  · No pushing, pulling, lifting, or weight bearing on operative extremity.  · Continue to follow shoulder precautions as instructed by hospital physical therapist  · Your surgeon will discuss with you when you will be able to drive again.  · First week stay inside on even terrain. May go up and down stairs one stair at a time utilizing the hand rail  · After one week, you may venture outside.     III. Precautions:  · Everyone that comes near you should wash their hands  · No elective dental, genital-urinary, or colon procedures or surgical procedures for 12 weeks after surgery unless absolutely necessary.  · If dental work or surgical procedure is deemed absolutely necessary, you will need to contact your surgeon as you will need to take antibiotics 1 hour prior to any dental work (including teeth cleanings).  · Please discuss with your surgeon prophylactic antibiotics  as the length of time this intervention will be necessary for you varies with each patient’s health history and situation.  · Avoid sick people. If you must be around someone who is ill, they should wear a mask.  · Avoid visits to the Emergency Room or Urgent Care unless you are having a life threatening event.      IV. INCISION CARE:  · Wash your hands prior to dressing changes  · Change the dressing as needed to keep incision clean and dry. Utilize dry gauze and paper tape. Avoid touching the side of the gauze that goes against the incision with your hands.  · No creams or ointments to the incision  · May remove dressing once the incision is free of drainage  · Do not touch or pick at the incision  · Check incision every day and notify surgeon immediately if any of the following signs or symptoms are noted:  ¨ Increase in redness  ¨ Increase in swelling around the incision and of the entire extremity  ¨ Increase in pain  ¨ Drainage oozing from the incision  ¨ Pulling apart of the edges of the incision  ¨ Increase in overall body temperature (greater than 100.5 degrees)  · Your surgeon will instruct you regarding suture or staple removal     V. Medications:      1. Stool Softeners: You will be at greater risk of constipation after surgery due to being less mobile and the pain medications.   · Take stool softeners as instructed by your surgeon while on pain medications. Over the counter Colace 100 mg 1-2 capsules twice daily.   · If stools become too loose or too frequent, please decreases the dosage or stop the stool softener.  · If constipation occurs despite use of stool softeners, you are to continue the stool softeners and add a laxative (Milk of Magnesia 1 ounce daily as needed)  · Drink plenty of fluids, and eat fruits and vegetables during your recovery time     2. Pain Medications utilized after surgery are narcotics and the law requires that the following information be given to all patients that are  prescribed narcotics:  · CLASSIFICATION: Pain medications are called Opioids and are narcotics  · LEGALITIES: It is illegal to share narcotics with others and to drive within 24 hours of taking narcotics  · POTENTIAL SIDE EFFECTS: Potential side effects of opioids include: nausea, vomiting, itching, dizziness, drowsiness, dry mouth, constipation, and difficulty urinating.  · POTENTIAL ADVERSE EFFECTS:   ¨ Opioid tolerance can develop with use of pain medications and this simply means that it requires more and more of the medication to control pain; however, this is seen more in patients that use opioids for longer periods of time.  ¨ Opioid dependence can develop with use of Opioids and this simply means that to stop the medication can cause withdrawal symptoms; however, this is seen with patients that use Opioids for longer periods of time.  ¨ Opioid addiction can develop with use of Opioids and the incidence of this is very unlikely in patients who take the medications as ordered and stop the medications as instructed.  ¨ Opioid overdose can be dangerous, but is unlikely when the medication is taken as ordered and stopped when ordered. It is important not to mix opioids with alcohol or with and type of sedative such as Benadryl as this can lead to over sedation and respiratory difficulty.  · DOSAGE:   ¨ Pain medications will need to be taken consistently for the first week to decrease pain and promote adequate pain relief and participation in physical therapy.  ¨ After the initial surgical pain begins to resolve, you may begin to decrease the pain medication. By the end of 6 weeks, you should be off of pain medications.  ¨ Refills will not be given by the office during evening hours, on weekends, or after 6 weeks post-op.  ¨ To seek refills on pain medications during the initial 6 week post-operative period, you must call the office 48 hours in advance to request the refill. The office will then notify you when to   the prescription. DO NOT wait until you are out of the medication to request a refill.     V. FOLLOW-UP VISITS:  · You will need to follow up in the office with your surgeon in 2 weeks. Please call this number [phone] to schedule this appointment.  · You will need to follow up with your primary care physician in 4 weeks.  · If you have any concerns or suspected complications prior to your follow up visit, please call your surgeons office. Do not wait until your appointment time if you suspect complications. These will need to be addressed in the office promptly.

## 2017-04-14 NOTE — SIGNIFICANT NOTE
04/14/17 1214   Rehab Treatment   Discipline physical therapist  (educated on pendulum therex. no mobility issues. to d/c today. will sign off. )

## 2017-04-14 NOTE — PLAN OF CARE
Problem: Patient Care Overview (Adult)  Goal: Plan of Care Review  Outcome: Ongoing (interventions implemented as appropriate)    04/14/17 0304   Coping/Psychosocial Response Interventions   Plan Of Care Reviewed With patient   Patient Care Overview   Progress improving   Outcome Evaluation   Outcome Summary/Follow up Plan patient ambulating with assistance to bathroom, pain controlled with oral pain medication at this time       Goal: Adult Individualization and Mutuality  Outcome: Ongoing (interventions implemented as appropriate)  Goal: Discharge Needs Assessment  Outcome: Ongoing (interventions implemented as appropriate)    Problem: Perioperative Period (Adult)  Goal: Signs and Symptoms of Listed Potential Problems Will be Absent or Manageable (Perioperative Period)  Outcome: Ongoing (interventions implemented as appropriate)    Problem: Fall Risk (Adult)  Goal: Identify Related Risk Factors and Signs and Symptoms  Outcome: Outcome(s) achieved Date Met:  04/14/17  Goal: Absence of Falls  Outcome: Ongoing (interventions implemented as appropriate)    Problem: Self-Care Deficit (Adult,Obstetrics,Pediatric)  Goal: Identify Related Risk Factors and Signs and Symptoms  Outcome: Outcome(s) achieved Date Met:  04/14/17  Goal: Improved Ability to Perform BADL and IADL  Outcome: Ongoing (interventions implemented as appropriate)    Problem: Pain, Acute (Adult)  Goal: Identify Related Risk Factors and Signs and Symptoms  Outcome: Outcome(s) achieved Date Met:  04/14/17  Goal: Acceptable Pain Control/Comfort Level  Outcome: Ongoing (interventions implemented as appropriate)

## 2017-04-14 NOTE — PROGRESS NOTES
Orthopedic Progress Note    Subjective   Pt AAOx#. Pain is mild. Effects of block resolved. Pain controlled with Percocet.  Post-Operative Day: POD #1 s/p R RTSA  Systemic or Specific Complaints: No Complaints    Objective     Vital signs in last 24 hours:  Temp:  [96.7 °F (35.9 °C)-98.4 °F (36.9 °C)] 97.4 °F (36.3 °C)  Heart Rate:  [64-98] 86  Resp:  [14-18] 16  BP: (107-140)/(63-82) 112/63    General: alert, appears stated age and cooperative   Neurovascular: Radial nerve: Intact, Ulnar nerve: Intact and Median nerve: Intact  Radial pulse: 2+   Wound: Dressing clean and dry no evidence of infection.   Range of Motion: Not tested     Data Review  CBC:  Results from last 7 days  Lab Units 04/14/17  0448   HEMOGLOBIN g/dL 12.2*   HEMATOCRIT % 36.2*       Assessment/Plan     IMPRESSION:stable POD #1    Pain Relief: some relief    PLAN:D/C home today after physical therapy.     Activity: stay in sling     LOS: 1 day     Jamal Prince MD    Date: 4/14/2017  Time: 7:28 AM

## 2017-04-18 ENCOUNTER — APPOINTMENT (OUTPATIENT)
Dept: DIABETES SERVICES | Facility: HOSPITAL | Age: 67
End: 2017-04-18

## 2017-05-31 ENCOUNTER — LAB (OUTPATIENT)
Dept: ENDOCRINOLOGY | Age: 67
End: 2017-05-31

## 2017-05-31 DIAGNOSIS — E11.9 TYPE 2 DIABETES MELLITUS WITHOUT COMPLICATION, WITH LONG-TERM CURRENT USE OF INSULIN (HCC): ICD-10-CM

## 2017-05-31 DIAGNOSIS — Z79.4 TYPE 2 DIABETES MELLITUS WITHOUT COMPLICATION, WITH LONG-TERM CURRENT USE OF INSULIN (HCC): ICD-10-CM

## 2017-05-31 DIAGNOSIS — E78.49 OTHER HYPERLIPIDEMIA: ICD-10-CM

## 2017-05-31 LAB
ALBUMIN SERPL-MCNC: 4.5 G/DL (ref 3.5–5.2)
ALBUMIN/GLOB SERPL: 1.7 G/DL
ALP SERPL-CCNC: 78 U/L (ref 39–117)
ALT SERPL-CCNC: 32 U/L (ref 1–41)
AST SERPL-CCNC: 22 U/L (ref 1–40)
BILIRUB SERPL-MCNC: 0.3 MG/DL (ref 0.1–1.2)
BUN SERPL-MCNC: 13 MG/DL (ref 8–23)
BUN/CREAT SERPL: 14.1 (ref 7–25)
CALCIUM SERPL-MCNC: 9.7 MG/DL (ref 8.6–10.5)
CHLORIDE SERPL-SCNC: 100 MMOL/L (ref 98–107)
CHOLEST SERPL-MCNC: 126 MG/DL (ref 0–200)
CO2 SERPL-SCNC: 27.1 MMOL/L (ref 22–29)
CREAT SERPL-MCNC: 0.92 MG/DL (ref 0.76–1.27)
FOLATE SERPL-MCNC: >20 NG/ML (ref 4.78–24.2)
GLOBULIN SER CALC-MCNC: 2.6 GM/DL
GLUCOSE SERPL-MCNC: 139 MG/DL (ref 65–99)
HBA1C MFR BLD: 6.51 % (ref 4.8–5.6)
HDLC SERPL-MCNC: 34 MG/DL (ref 40–60)
LDLC SERPL CALC-MCNC: 60 MG/DL (ref 0–100)
POTASSIUM SERPL-SCNC: 4.5 MMOL/L (ref 3.5–5.2)
PROT SERPL-MCNC: 7.1 G/DL (ref 6–8.5)
SODIUM SERPL-SCNC: 143 MMOL/L (ref 136–145)
TRIGL SERPL-MCNC: 158 MG/DL (ref 0–150)
TSH SERPL DL<=0.005 MIU/L-ACNC: 1.69 MIU/ML (ref 0.27–4.2)
UNABLE TO VOID: NORMAL
VIT B12 SERPL-MCNC: 945 PG/ML (ref 211–946)
VLDLC SERPL CALC-MCNC: 31.6 MG/DL (ref 5–40)

## 2017-06-14 ENCOUNTER — OFFICE VISIT (OUTPATIENT)
Dept: ENDOCRINOLOGY | Age: 67
End: 2017-06-14

## 2017-06-14 VITALS
SYSTOLIC BLOOD PRESSURE: 124 MMHG | WEIGHT: 209 LBS | OXYGEN SATURATION: 96 % | HEIGHT: 70 IN | DIASTOLIC BLOOD PRESSURE: 70 MMHG | BODY MASS INDEX: 29.92 KG/M2 | HEART RATE: 83 BPM

## 2017-06-14 DIAGNOSIS — E55.9 VITAMIN D DEFICIENCY: ICD-10-CM

## 2017-06-14 DIAGNOSIS — E78.49 OTHER HYPERLIPIDEMIA: ICD-10-CM

## 2017-06-14 DIAGNOSIS — E11.9 TYPE 2 DIABETES MELLITUS WITHOUT COMPLICATION, WITH LONG-TERM CURRENT USE OF INSULIN (HCC): Primary | ICD-10-CM

## 2017-06-14 DIAGNOSIS — Z79.4 TYPE 2 DIABETES MELLITUS WITHOUT COMPLICATION, WITH LONG-TERM CURRENT USE OF INSULIN (HCC): Primary | ICD-10-CM

## 2017-06-14 PROCEDURE — 99214 OFFICE O/P EST MOD 30 MIN: CPT | Performed by: INTERNAL MEDICINE

## 2017-06-14 NOTE — PROGRESS NOTES
67 y.o.    Patient Care Team:  Norman Denise MD as PCP - General  Norman Denise MD as PCP - Family Medicine  Asya Henao MD as Consulting Physician (Endocrinology)    Chief Complaint:    3 Month Follow Up/ Type 2 Diabetes Mellitus  Subjective     HPI  Andrea Fam 66 y.o. presents with Type 2 dm as a follow up patient. Referred by Dr. Denise. Pt was admitted to the hospital for shoulder repair and was in hospital for a week.       Type 2 dm - Diagnosed about 18 years ago. Was started on oral agents initially. Insulin was started about 10 years ago. Currently on Levemir 18 units at bed time, Victoza 1.8 mg subq daily, Metformin 1000 mg po twice daily.   Checks BG 1-2 times a day but not consistently in the last few weeks. Fasting blood sugars 130 - 140  mg/dL. Postprandial blood sugars 200 - 210 mg/dL..   No increased urination or increased thirst. No BG less than 60 mg/dl in the last one month, does have hypoglycemic awareness. Highest BG in the last 1 month was - 240 mg/dl.  Pt got his log book for me. No nausea and vomiting.   Up to date with eye exam, due for next exam in June 2017 and no dm retinopathy. Does have cataract in both eyes.   C/o tingling or numbness in his b/l feet and hands, no ulcers and amputations of his feet.  Currently on Oxcarbazepine.   No hx of CAD, CKD,CVA per pt.   Pt is physically active. Weight has been stable.   Pt tries to follow DM diet for most part.     UwP2v-8.3% from January 2017 -->  6.51%      Low testosterone - On androgel 1.62% one application daily. Managed by PCP.       HLP - On lipitor 10 mg po daily, Niacin 500 mg two times daily, Fish oil two times daily.        Interval History      The following portions of the patient's history were reviewed and updated as appropriate: allergies, current medications, past family history, past medical history, past social history, past surgical history and problem list.    Past Medical History:   Diagnosis Date   •  Arthritis    • Cataract     HAVING SURGERY IN JUNE   • Diabetes mellitus    • High cholesterol    • Neuropathy     FINGERS AND LOWER LEGS   • Obstructive sleep apnea on CPAP    • Shoulder pain, right    • Testosterone deficiency    • Wears contact lenses      Family History   Problem Relation Age of Onset   • Alzheimer's disease Mother    • Lymphoma Paternal Grandmother      Social History     Social History   • Marital status:      Spouse name: N/A   • Number of children: N/A   • Years of education: N/A     Occupational History   • Not on file.     Social History Main Topics   • Smoking status: Former Smoker     Packs/day: 1.50     Years: 30.00     Types: Cigarettes   • Smokeless tobacco: Never Used      Comment: QUIT AGE 48   • Alcohol use Yes      Comment: 2  drinks per  month   • Drug use: No   • Sexual activity: Defer     Other Topics Concern   • Not on file     Social History Narrative     No Known Allergies    Current Outpatient Prescriptions:   •  APPLE CIDER VINEGAR PO, Take 1 tablet by mouth 2 (Two) Times a Day. VINTAB  WILL HOLD PRIOR TO SURGERY, Disp: , Rfl:   •  atorvastatin (LIPITOR) 10 MG tablet, Take 10 mg by mouth Daily., Disp: , Rfl:   •  Cholecalciferol (VITAMIN D) 2000 UNITS tablet, Take 2,000 Units by mouth 2 (Two) Times a Day., Disp: , Rfl:   •  FOLIC ACID PO, Take 400 mcg by mouth 2 (Two) Times a Day. WILL HOLD PRIOR TO SURGERY, Disp: , Rfl:   •  insulin detemir (LEVEMIR) 100 UNIT/ML injection, Inject 18 Units under the skin Every Night., Disp: , Rfl:   •  Liraglutide (VICTOZA SC), Inject 1.8 mg under the skin Every Morning., Disp: , Rfl:   •  metFORMIN (GLUCOPHAGE) 500 MG tablet, Take 1 tablet by mouth 2 (Two) Times a Day With Meals. (Patient taking differently: Take 1,000 mg by mouth 2 (Two) Times a Day With Meals.), Disp: 180 tablet, Rfl: 3  •  niacin 500 MG tablet, Take 500 mg by mouth 2 (Two) Times a Day With Meals. WILL HOLD PRIOR TO SURGERY, Disp: , Rfl:   •  Omega-3 Fatty Acids  "(FISH OIL) 1000 MG capsule capsule, Take 1,000 mg by mouth 2 (Two) Times a Day With Meals. PT TO HOLD MED PRIOR TO OR, Disp: , Rfl:   •  OXcarbazepine  MG tablet sustained-release 24 hour, Take 1-1.5 tablets by mouth 2 (Two) Times a Day. PT TAKES 1 TAB IN THE AM AND 1.5 TAB IN THE PM, Disp: , Rfl:   •  Testosterone (ANDROGEL) 20.25 MG/1.25GM (1.62%) gel, Place 2 application on the skin Daily. 1 TO EACH THIGH, Disp: , Rfl:   •  vitamin B-12 (CYANOCOBALAMIN) 1000 MCG tablet, Take 1,000 mcg by mouth Every Other Day. TAKES ON ODD NUMBER DAYS WILL HOLD PRIOR TO SURGERY, Disp: , Rfl:   •  vitamin C (ASCORBIC ACID) 500 MG tablet, Take 1,000 mg by mouth 2 (Two) Times a Day. PT TO HOLD PRIOR TO SURGERY, Disp: , Rfl:   •  SITagliptin (JANUVIA) 100 MG tablet, Take 1 tablet by mouth Daily., Disp: 30 tablet, Rfl: 11        Review of Systems   Constitutional: Negative for fever.   HENT: Negative for facial swelling, nosebleeds, trouble swallowing and voice change.    Eyes: Negative for pain and redness.   Respiratory: Negative for shortness of breath and wheezing.    Cardiovascular: Negative for palpitations and leg swelling.   Gastrointestinal: Negative for abdominal pain, diarrhea and vomiting.   Endocrine: Negative for polydipsia and polyuria.   Genitourinary: Negative for decreased urine volume and frequency.   Musculoskeletal: Negative for joint swelling and neck pain.   Skin: Negative for rash.   Allergic/Immunologic: Negative for immunocompromised state.   Neurological: Negative for seizures and facial asymmetry.   Hematological: Does not bruise/bleed easily.   Psychiatric/Behavioral: Negative for agitation and confusion.       Objective       Vitals:    06/14/17 1006   BP: 124/70   Pulse: 83   SpO2: 96%   Weight: 209 lb (94.8 kg)   Height: 70\" (177.8 cm)     Body mass index is 29.99 kg/(m^2).      Physical Exam   Constitutional: He is oriented to person, place, and time. He appears well-nourished.   Obese male "   HENT:   Head: Normocephalic and atraumatic.   Eyes: Conjunctivae and EOM are normal.   Neck: Normal range of motion. Neck supple. Carotid bruit is not present. No thyromegaly present.   Acanthosis nigricans   Cardiovascular: Normal rate and normal heart sounds.    Heart rate-80/m   Pulmonary/Chest: Effort normal and breath sounds normal. No stridor. No respiratory distress. He has no wheezes.   Abdominal: Soft. Bowel sounds are normal. He exhibits no distension. There is no tenderness.   Central obesity   Musculoskeletal: He exhibits no edema or tenderness.   Lymphadenopathy:     He has no cervical adenopathy.   Neurological: He is alert and oriented to person, place, and time.   No hammer toes, decreased pin prick and proprioception.   Skin: Skin is warm and dry.   Psychiatric: He has a normal mood and affect. His behavior is normal.   Vitals reviewed.    Results Review:     I reviewed the patient's new clinical results.    Medical records reviewed  Summary: done      Lab on 05/31/2017   Component Date Value Ref Range Status   • Hemoglobin A1C 05/31/2017 6.51* 4.80 - 5.60 % Final    Comment: Hemoglobin A1C Ranges:  Increased Risk for Diabetes  5.7% to 6.4%  Diabetes                     >= 6.5%  Diabetic Goal                < 7.0%     • Total Cholesterol 05/31/2017 126  0 - 200 mg/dL Final   • Triglycerides 05/31/2017 158* 0 - 150 mg/dL Final   • HDL Cholesterol 05/31/2017 34* 40 - 60 mg/dL Final   • VLDL Cholesterol 05/31/2017 31.6  5 - 40 mg/dL Final   • LDL Cholesterol  05/31/2017 60  0 - 100 mg/dL Final   • TSH 05/31/2017 1.690  0.270 - 4.200 mIU/mL Final   • Vitamin B-12 05/31/2017 945  211 - 946 pg/mL Final   • Folate 05/31/2017 >20.00  4.78 - 24.20 ng/mL Final   • Glucose 05/31/2017 139* 65 - 99 mg/dL Final   • BUN 05/31/2017 13  8 - 23 mg/dL Final   • Creatinine 05/31/2017 0.92  0.76 - 1.27 mg/dL Final   • eGFR Non African Am 05/31/2017 82  >60 mL/min/1.73 Final   • eGFR African Am 05/31/2017 99  >60  mL/min/1.73 Final   • BUN/Creatinine Ratio 05/31/2017 14.1  7.0 - 25.0 Final   • Sodium 05/31/2017 143  136 - 145 mmol/L Final   • Potassium 05/31/2017 4.5  3.5 - 5.2 mmol/L Final   • Chloride 05/31/2017 100  98 - 107 mmol/L Final   • Total CO2 05/31/2017 27.1  22.0 - 29.0 mmol/L Final   • Calcium 05/31/2017 9.7  8.6 - 10.5 mg/dL Final   • Total Protein 05/31/2017 7.1  6.0 - 8.5 g/dL Final   • Albumin 05/31/2017 4.50  3.50 - 5.20 g/dL Final   • Globulin 05/31/2017 2.6  gm/dL Final   • A/G Ratio 05/31/2017 1.7  g/dL Final   • Total Bilirubin 05/31/2017 0.3  0.1 - 1.2 mg/dL Final   • Alkaline Phosphatase 05/31/2017 78  39 - 117 U/L Final   • AST (SGOT) 05/31/2017 22  1 - 40 U/L Final   • ALT (SGPT) 05/31/2017 32  1 - 41 U/L Final   • Unable to Void 05/31/2017 Comment   Final    Comment: The patient was not able to render a urine sample and has been  instructed to return for a urine collection at their earliest  convenience.  The urine testing that you have requested has  been deleted from this report.  When the patient returns and  provides a urine specimen, the urine testing will be performed  and separately reported.       Lab Results   Component Value Date    HGBA1C 6.51 (H) 05/31/2017    HGBA1C 9.30 (H) 01/13/2017     Lab Results   Component Value Date    CREATININE 0.92 05/31/2017     Imaging Results (most recent)     None                Assessment and Plan:    Andrea was seen today for diabetes.    Diagnoses and all orders for this visit:    Type 2 diabetes mellitus without complication, with long-term current use of insulin  -     Vitamin D 25 Hydroxy; Future  -     Vitamin B12 & Folate; Future  -     TSH; Future  -     Microalbumin / Creatinine Urine Ratio; Future  -     Lipid Panel; Future  -     Comprehensive Metabolic Panel; Future  -     Hemoglobin A1c; Future    Other hyperlipidemia  -     Vitamin D 25 Hydroxy; Future  -     Vitamin B12 & Folate; Future  -     TSH; Future  -     Microalbumin / Creatinine  Urine Ratio; Future  -     Lipid Panel; Future  -     Comprehensive Metabolic Panel; Future  -     Hemoglobin A1c; Future    Vitamin D deficiency   -     Vitamin D 25 Hydroxy; Future    Other orders  -     SITagliptin (JANUVIA) 100 MG tablet; Take 1 tablet by mouth Daily.    Type 2 diabetes mellitus with HbA1c improved  Continue levemir 18 units at bedtime  Continue Victoza 1.8 mg subcutaneous daily  Continue metformin thousand milligrams twice daily.  Will start patient on Januvia 100 mg oral daily.  Counseled patient to check his blood sugars at least 1-2 times a day.    Diabetic neuropathy  Will start patient on metanx    Vitamin D deficiency  Will check vitamin D levels.    13 minutes out of 25 minutes face to face spent in counseling the patient extensively on medication changes of side effects.

## 2017-06-26 ENCOUNTER — TELEPHONE (OUTPATIENT)
Dept: ENDOCRINOLOGY | Age: 67
End: 2017-06-26

## 2017-06-26 NOTE — TELEPHONE ENCOUNTER
----- Message from Tasia Reynolds sent at 6/26/2017 11:14 AM EDT -----  Contact: patient  metanx was given to him and blood sugar gone thru the roof    Wasn't aware that januvia was going to be prescribed to him    Please call pt to discuss  [phone]      Spoke with patient   Let patient know that Dr Henao want him to stop the metanx and increase his levemir 20 units at night. Patient stated he will fax over his blood sugar for Dr Henao to look at them

## 2017-07-07 ENCOUNTER — TELEPHONE (OUTPATIENT)
Dept: ENDOCRINOLOGY | Age: 67
End: 2017-07-07

## 2017-07-07 NOTE — TELEPHONE ENCOUNTER
spoke with patient about blood sugar reading let patient know that Dr caba reviewed them and changed medication dosage to Levemir 24 unit  and if blood sugar is less then 100 2-3 times in a row decrease the levemir to 22 units

## 2017-09-01 ENCOUNTER — OFFICE VISIT (OUTPATIENT)
Dept: FAMILY MEDICINE CLINIC | Facility: CLINIC | Age: 67
End: 2017-09-01

## 2017-09-01 VITALS
WEIGHT: 209.6 LBS | DIASTOLIC BLOOD PRESSURE: 78 MMHG | BODY MASS INDEX: 30.01 KG/M2 | RESPIRATION RATE: 16 BRPM | SYSTOLIC BLOOD PRESSURE: 128 MMHG | HEIGHT: 70 IN | OXYGEN SATURATION: 95 % | HEART RATE: 75 BPM | TEMPERATURE: 98.8 F

## 2017-09-01 DIAGNOSIS — M53.3 SACRAL PAIN: Primary | ICD-10-CM

## 2017-09-01 DIAGNOSIS — E11.42 TYPE 2 DIABETES MELLITUS WITH DIABETIC POLYNEUROPATHY, UNSPECIFIED LONG TERM INSULIN USE STATUS: ICD-10-CM

## 2017-09-01 DIAGNOSIS — G62.9 PERIPHERAL NERVE DISEASE: ICD-10-CM

## 2017-09-01 PROCEDURE — 99203 OFFICE O/P NEW LOW 30 MIN: CPT | Performed by: INTERNAL MEDICINE

## 2017-09-01 RX ORDER — OXCARBAZEPINE 600 MG/1
TABLET, FILM COATED ORAL
COMMUNITY
Start: 2017-08-15 | End: 2017-09-01 | Stop reason: SDUPTHER

## 2017-09-01 RX ORDER — ATORVASTATIN CALCIUM 20 MG/1
TABLET, FILM COATED ORAL
COMMUNITY
Start: 2017-06-27 | End: 2017-09-01 | Stop reason: SDUPTHER

## 2017-09-01 RX ORDER — INSULIN DETEMIR 100 [IU]/ML
INJECTION, SOLUTION SUBCUTANEOUS
COMMUNITY
Start: 2017-06-29 | End: 2017-09-01 | Stop reason: SDUPTHER

## 2017-09-01 NOTE — PROGRESS NOTES
"Subjective   Patient ID: Andrea Fam is a 67 y.o. male presents with   Chief Complaint   Patient presents with   • Diabetes/ back pain   • Establish Care     new patient       HPI - This patient presents today to establish care.  He has a past history of type 2 diabetes insulin-dependent with polyneuropathy.  He also has sleep apnea.  He has a lot of orthopedic injuries.  He has had shoulder surgery and an L4-5 disc bulge.  He sees endocrinology and his A1c is in the normal range.  He's had a colonoscopy he declines flu vaccine and zoster.  He's been having right sided sacral pain intermittently for like the past month it's mild to moderate in intensity he wants to go to PT for this.    Assessment plan    Type 2 diabetes he sees endocrinology continue current regimen    Peripheral neuropathy-he's on oxcarbazepine    Sacral pain on the right he wants to see physical therapy for this    No Known Allergies    The following portions of the patient's history were reviewed and updated as appropriate: allergies, current medications, past family history, past medical history, past social history, past surgical history and problem list.      Review of Systems   Constitutional: Negative.    HENT: Negative.    Eyes: Negative.    Respiratory: Negative.    Cardiovascular: Negative.    Gastrointestinal: Negative.    Endocrine: Negative.    Genitourinary: Negative.    Musculoskeletal: Positive for arthralgias and gait problem.   Skin: Negative.    Allergic/Immunologic: Negative.    Neurological: Positive for numbness.   Hematological: Negative.    Psychiatric/Behavioral: Negative.        Objective     Vitals:    09/01/17 1503   BP: 128/78   Pulse: 75   Resp: 16   Temp: 98.8 °F (37.1 °C)   TempSrc: Oral   SpO2: 95%   Weight: 209 lb 9.6 oz (95.1 kg)   Height: 70\" (177.8 cm)         Physical Exam   Constitutional: He is oriented to person, place, and time. He appears well-developed and well-nourished.   HENT:   Head: Normocephalic " and atraumatic.   Eyes: EOM are normal. Pupils are equal, round, and reactive to light.   Cardiovascular: Normal rate, regular rhythm and normal heart sounds.    Pulmonary/Chest: Effort normal and breath sounds normal.   Abdominal: Soft. Bowel sounds are normal.   Neurological: He is alert and oriented to person, place, and time.   Psychiatric: He has a normal mood and affect. His behavior is normal.   Nursing note and vitals reviewed.        Andrea was seen today for diabetes/ back pain and establish care.    Diagnoses and all orders for this visit:    Sacral pain  -     Cancel: Ambulatory Referral to Physical Therapy Evaluate and treat  -     Ambulatory Referral to Physical Therapy Evaluate and treat    Type 2 diabetes mellitus with diabetic polyneuropathy, unspecified long term insulin use status    Peripheral nerve disease        Call or return to clinic prn if these symptoms worsen or fail to improve as anticipated.

## 2017-09-07 ENCOUNTER — LAB (OUTPATIENT)
Dept: ENDOCRINOLOGY | Age: 67
End: 2017-09-07

## 2017-09-07 DIAGNOSIS — E78.49 OTHER HYPERLIPIDEMIA: ICD-10-CM

## 2017-09-07 DIAGNOSIS — E55.9 VITAMIN D DEFICIENCY: ICD-10-CM

## 2017-09-07 DIAGNOSIS — E11.9 TYPE 2 DIABETES MELLITUS WITHOUT COMPLICATION, WITH LONG-TERM CURRENT USE OF INSULIN (HCC): ICD-10-CM

## 2017-09-07 DIAGNOSIS — Z79.4 TYPE 2 DIABETES MELLITUS WITHOUT COMPLICATION, WITH LONG-TERM CURRENT USE OF INSULIN (HCC): ICD-10-CM

## 2017-09-09 LAB
25(OH)D3+25(OH)D2 SERPL-MCNC: 36 NG/ML (ref 30–100)
ALBUMIN SERPL-MCNC: 4.8 G/DL (ref 3.5–5.2)
ALBUMIN/GLOB SERPL: 1.9 G/DL
ALP SERPL-CCNC: 51 U/L (ref 39–117)
ALT SERPL-CCNC: 32 U/L (ref 1–41)
AST SERPL-CCNC: 28 U/L (ref 1–40)
BILIRUB SERPL-MCNC: 0.4 MG/DL (ref 0.1–1.2)
BUN SERPL-MCNC: 14 MG/DL (ref 8–23)
BUN/CREAT SERPL: 16.5 (ref 7–25)
CALCIUM SERPL-MCNC: 10.1 MG/DL (ref 8.6–10.5)
CHLORIDE SERPL-SCNC: 93 MMOL/L (ref 98–107)
CHOLEST SERPL-MCNC: 123 MG/DL (ref 0–200)
CO2 SERPL-SCNC: 29.4 MMOL/L (ref 22–29)
CREAT SERPL-MCNC: 0.85 MG/DL (ref 0.76–1.27)
FOLATE SERPL-MCNC: >20 NG/ML (ref 4.78–24.2)
GLOBULIN SER CALC-MCNC: 2.5 GM/DL
GLUCOSE SERPL-MCNC: 149 MG/DL (ref 65–99)
HBA1C MFR BLD: 6.9 % (ref 4.8–5.6)
HDLC SERPL-MCNC: 36 MG/DL (ref 40–60)
LDLC SERPL CALC-MCNC: 68 MG/DL (ref 0–100)
POTASSIUM SERPL-SCNC: 4.5 MMOL/L (ref 3.5–5.2)
PROT SERPL-MCNC: 7.3 G/DL (ref 6–8.5)
SHBG SERPL-SCNC: 22 NMOL/L (ref 19.3–76.4)
SODIUM SERPL-SCNC: 136 MMOL/L (ref 136–145)
TESTOST FREE SERPL-MCNC: 11.9 PG/ML (ref 6.6–18.1)
TESTOST SERPL-MCNC: 271 NG/DL (ref 264–916)
TRIGL SERPL-MCNC: 93 MG/DL (ref 0–150)
TSH SERPL DL<=0.005 MIU/L-ACNC: 1.36 MIU/ML (ref 0.27–4.2)
UNABLE TO VOID: NORMAL
VIT B12 SERPL-MCNC: 1106 PG/ML (ref 211–946)
VLDLC SERPL CALC-MCNC: 18.6 MG/DL (ref 5–40)

## 2017-09-26 LAB
ALBUMIN/CREAT UR: 10.9 MG/G CREAT (ref 0–30)
CREAT UR-MCNC: 150.1 MG/DL
MICROALBUMIN UR-MCNC: 16.3 UG/ML

## 2017-11-01 ENCOUNTER — OFFICE VISIT (OUTPATIENT)
Dept: FAMILY MEDICINE CLINIC | Facility: CLINIC | Age: 67
End: 2017-11-01

## 2017-11-01 VITALS
TEMPERATURE: 97.8 F | WEIGHT: 212 LBS | RESPIRATION RATE: 16 BRPM | BODY MASS INDEX: 30.35 KG/M2 | SYSTOLIC BLOOD PRESSURE: 124 MMHG | OXYGEN SATURATION: 94 % | HEIGHT: 70 IN | DIASTOLIC BLOOD PRESSURE: 82 MMHG | HEART RATE: 70 BPM

## 2017-11-01 DIAGNOSIS — G47.33 OSA (OBSTRUCTIVE SLEEP APNEA): ICD-10-CM

## 2017-11-01 DIAGNOSIS — E29.1 HYPOGONADISM IN MALE: Primary | ICD-10-CM

## 2017-11-01 DIAGNOSIS — R39.12 WEAK URINARY STREAM: ICD-10-CM

## 2017-11-01 LAB — PSA SERPL-MCNC: 0.31 NG/ML (ref 0–4)

## 2017-11-01 PROCEDURE — 99213 OFFICE O/P EST LOW 20 MIN: CPT | Performed by: INTERNAL MEDICINE

## 2017-11-01 RX ORDER — TESTOSTERONE 20.25 MG/1.25G
2 GEL TOPICAL DAILY
Qty: 1.25 G | Refills: 5 | Status: SHIPPED | OUTPATIENT
Start: 2017-11-01 | End: 2018-08-07

## 2017-11-01 NOTE — PROGRESS NOTES
"Subjective   Patient ID: Adnrea Fam is a 67 y.o. male presents with   Chief Complaint   Patient presents with   • Med Refill     on testosterone and wants to speak to you about sleep study       HPI  - This patient has a history of hypogonadism.  His endocrinologist checked labs but does not prescribe him his testosterone.  I will be glad to do that but I'm going to have to have him do a PSA which was not done.  He declines a prostate exam.  He also has sleep apnea and wants to see a sleep specialist.    Assessment plan    Hypogonadism-he's on 2 pumps of AndroGel 1.62% daily and this helps his quality of life helps his fatigue he has no side effects to it we'll get a PSA today.  He refuses a prostate exam    Obstructive sleep apnea-appointment with sleep medicine.      Weak urinary stream-we'll get a PSA  No Known Allergies    The following portions of the patient's history were reviewed and updated as appropriate: allergies, current medications, past family history, past medical history, past social history, past surgical history and problem list.      Review of Systems   Constitutional: Negative.    Cardiovascular: Negative.    Genitourinary:        Slightly weak urinary stream   Psychiatric/Behavioral: Positive for sleep disturbance. Negative for dysphoric mood.       Objective     Vitals:    11/01/17 0813   BP: 124/82   Pulse: 70   Resp: 16   Temp: 97.8 °F (36.6 °C)   TempSrc: Oral   SpO2: 94%   Weight: 212 lb (96.2 kg)   Height: 70\" (177.8 cm)         Physical Exam   Constitutional: He is oriented to person, place, and time. He appears well-developed and well-nourished.   HENT:   Head: Normocephalic and atraumatic.   Cardiovascular: Normal rate, regular rhythm and normal heart sounds.    Pulmonary/Chest: Effort normal and breath sounds normal.   Neurological: He is alert and oriented to person, place, and time.   Nursing note and vitals reviewed.        Andrea was seen today for med refill.    Diagnoses and all " orders for this visit:    Hypogonadism in male    LISANDRA (obstructive sleep apnea)  -     Ambulatory Referral to Sleep Medicine    Weak urinary stream  -     PSA    Other orders  -     Testosterone (ANDROGEL) 20.25 MG/1.25GM (1.62%) gel; Place 2 application on the skin Daily. 1 TO EACH THIGH        Call or return to clinic prn if these symptoms worsen or fail to improve as anticipated.

## 2017-11-03 ENCOUNTER — OFFICE VISIT (OUTPATIENT)
Dept: ENDOCRINOLOGY | Age: 67
End: 2017-11-03

## 2017-11-03 VITALS
HEART RATE: 92 BPM | DIASTOLIC BLOOD PRESSURE: 68 MMHG | BODY MASS INDEX: 30.21 KG/M2 | HEIGHT: 70 IN | WEIGHT: 211 LBS | OXYGEN SATURATION: 95 % | SYSTOLIC BLOOD PRESSURE: 122 MMHG

## 2017-11-03 DIAGNOSIS — E29.1 HYPOGONADISM IN MALE: ICD-10-CM

## 2017-11-03 DIAGNOSIS — E78.49 OTHER HYPERLIPIDEMIA: ICD-10-CM

## 2017-11-03 DIAGNOSIS — E11.9 TYPE 2 DIABETES MELLITUS WITHOUT COMPLICATION, WITHOUT LONG-TERM CURRENT USE OF INSULIN (HCC): Primary | ICD-10-CM

## 2017-11-03 PROCEDURE — 99214 OFFICE O/P EST MOD 30 MIN: CPT | Performed by: INTERNAL MEDICINE

## 2017-11-03 RX ORDER — GABAPENTIN 100 MG/1
100 CAPSULE ORAL 3 TIMES DAILY
Qty: 90 CAPSULE | Refills: 2 | Status: SHIPPED | OUTPATIENT
Start: 2017-11-03 | End: 2018-06-08

## 2017-11-03 NOTE — PROGRESS NOTES
67 y.o.    Patient Care Team:  Kingsley Myers MD as PCP - General (Internal Medicine)  Norman Denise MD as PCP - Family Medicine  Asya Henao MD as Consulting Physician (Endocrinology)    Chief Complaint:    Follow Up / Type 2 Diabetes Mellitus  Subjective     HPI    Andrea Fam 66 y.o. presents with Type 2 dm as a follow up patient.       Type 2 dm - Diagnosed about 18 years ago. Was started on oral agents initially. Insulin was started about 10 years ago. Currently on Levemir 18 units at bed time, Victoza 1.8 mg subq daily, Metformin 1000 mg po twice daily, Januvia 100 mg oral daily.    Checks his blood sugars one to 2 times a day.  Average blood [id number] mg/dL.  No increased urination or increased thirst, no nausea or vomiting.  No blood sugar less than 60 mg/dL in the last one month, does have hypoglycemic awareness.  Highest blood sugar in the last 1 month was 312 mg/dL.  He got his log book for me to review.  Last eye exam was in June 2017 and no history of diabetic retinopathy.  Does complain of tingling and numbness in his bilateral feet and hands, no ulcers or amputations of his feet.  Currently on oxcarbazepine.  No history of CAD, CK D, CVA per patient.  He is physically active and reports that his weight is stable.  He tries to follow diabetic diet for most part.    PuJ4h-8.9%.    Low testosterone  Has been on this replacement for more than 10 years now.  Initially started by primary care physician.  Dr. Myers his current primary care physician requested me to continue this medication.  No complaints of increased urination, blood in his urine.  No history of blood clots.    Hyperlipidemia  On Lipitor 10 mg oral daily, niacin 500 mg twice daily, fish or 2 times daily.     Sleep apnea  Compliant with her CPAP machine.      Interval History      The following portions of the patient's history were reviewed and updated as appropriate: allergies, current medications, past family history,  past medical history, past social history, past surgical history and problem list.    Past Medical History:   Diagnosis Date   • Arthritis    • Cataract     HAVING SURGERY IN JUNE   • Diabetes mellitus    • High cholesterol    • Neuropathy     FINGERS AND LOWER LEGS   • Obstructive sleep apnea on CPAP    • Shoulder pain, right    • Testosterone deficiency    • Wears contact lenses      Family History   Problem Relation Age of Onset   • Alzheimer's disease Mother    • Lymphoma Paternal Grandmother      Social History     Social History   • Marital status:      Spouse name: N/A   • Number of children: N/A   • Years of education: N/A     Occupational History   • Not on file.     Social History Main Topics   • Smoking status: Former Smoker     Packs/day: 1.50     Years: 30.00     Types: Cigarettes   • Smokeless tobacco: Never Used      Comment: QUIT AGE 48   • Alcohol use Yes      Comment: 2  drinks per  month   • Drug use: No   • Sexual activity: Defer     Other Topics Concern   • Not on file     Social History Narrative     No Known Allergies    Current Outpatient Prescriptions:   •  atorvastatin (LIPITOR) 10 MG tablet, Take 10 mg by mouth Daily., Disp: , Rfl:   •  Cholecalciferol (VITAMIN D) 2000 UNITS tablet, Take 2,000 Units by mouth 2 (Two) Times a Day., Disp: , Rfl:   •  FOLIC ACID PO, Take 400 mcg by mouth 2 (Two) Times a Day. WILL HOLD PRIOR TO SURGERY, Disp: , Rfl:   •  insulin detemir (LEVEMIR) 100 UNIT/ML injection, Inject 18 Units under the skin Every Night., Disp: , Rfl:   •  Insulin Pen Needle (NOVOFINE PLUS) 32G X 4 MM misc, Use with Insulin, Disp: 200 each, Rfl: 3  •  Liraglutide (VICTOZA SC), Inject 1.8 mg under the skin Every Morning., Disp: , Rfl:   •  metFORMIN (GLUCOPHAGE) 500 MG tablet, Take 2 tablets by mouth 2 (Two) Times a Day With Meals., Disp: 120 tablet, Rfl: 0  •  Omega-3 Fatty Acids (FISH OIL) 1000 MG capsule capsule, Take 1,200 mg by mouth Daily With Breakfast. PT TO HOLD MED PRIOR  "TO OR, Disp: , Rfl:   •  SITagliptin (JANUVIA) 100 MG tablet, Take 1 tablet by mouth Daily., Disp: 90 tablet, Rfl: 3  •  Testosterone (ANDROGEL) 20.25 MG/1.25GM (1.62%) gel, Place 2 application on the skin Daily. 1 TO EACH THIGH, Disp: 1.25 g, Rfl: 5  •  vitamin B-12 (CYANOCOBALAMIN) 1000 MCG tablet, Take 1,000 mcg by mouth Every Other Day. TAKES ON ODD NUMBER DAYS WILL HOLD PRIOR TO SURGERY, Disp: , Rfl:   •  vitamin C (ASCORBIC ACID) 500 MG tablet, Take 1,000 mg by mouth 2 (Two) Times a Day. PT TO HOLD PRIOR TO SURGERY, Disp: , Rfl:   •  gabapentin (NEURONTIN) 100 MG capsule, Take 1 capsule by mouth 3 (Three) Times a Day., Disp: 90 capsule, Rfl: 2        Review of Systems   Constitutional: Negative for fatigue and fever.   HENT: Negative for facial swelling, nosebleeds, trouble swallowing and voice change.    Eyes: Negative for pain and redness.   Respiratory: Negative for shortness of breath and wheezing.    Cardiovascular: Negative for palpitations and leg swelling.   Gastrointestinal: Negative for abdominal pain, diarrhea and vomiting.   Endocrine: Negative for polydipsia and polyuria.   Genitourinary: Negative for decreased urine volume and frequency.   Musculoskeletal: Negative for joint swelling and neck pain.   Skin: Negative for rash.   Allergic/Immunologic: Negative for immunocompromised state.   Neurological: Positive for numbness. Negative for seizures and facial asymmetry.   Hematological: Does not bruise/bleed easily.   Psychiatric/Behavioral: Negative for agitation and confusion.       Objective       Vitals:    11/03/17 1357   BP: 122/68   Pulse: 92   SpO2: 95%   Weight: 211 lb (95.7 kg)   Height: 70\" (177.8 cm)     Body mass index is 30.28 kg/(m^2).      Physical Exam   Gen exam - alert and oriented x 3, obese male, not in distress.   HEENT - Acanthosis nigricans. Thyroid palpable.   Resp - Clear to auscultation.   CVS - S1,S2 heard and no murmurs.   Abd - Non tender, BS heard.   Ext - No edema and " intact pin prick and proprioception.     Results Review:     I reviewed the patient's new clinical results.    Medical records reviewed  Summary: done      Office Visit on 11/01/2017   Component Date Value Ref Range Status   • PSA 11/01/2017 0.309  0.000 - 4.000 ng/mL Final     Lab Results   Component Value Date    HGBA1C 6.90 (H) 09/07/2017    HGBA1C 6.51 (H) 05/31/2017    HGBA1C 9.30 (H) 01/13/2017     Lab Results   Component Value Date    MICROALBUR 16.3 09/25/2017    CREATININE 0.85 09/07/2017     Imaging Results (most recent)     None                Assessment and Plan:    Andrea was seen today for diabetes.    Diagnoses and all orders for this visit:    Type 2 diabetes mellitus without complication, without long-term current use of insulin    Hypogonadism in male    Other hyperlipidemia    Other orders  -     gabapentin (NEURONTIN) 100 MG capsule; Take 1 capsule by mouth 3 (Three) Times a Day.    Type 2 diabetes mellitus on insulin-uncontrolled  Continue the current insulin regimen and oral hypoglycemic agents.    Diabetic neuropathy  Will stop Oxcarbazepine  Will start Neurontin 100 mg 1 tablet 3 times a day.    Hypogonadism  Continue the AndroGel  Discussed the side effects of testosterone replacement-cardiovascular side effects.    Sleep apnea  Patient is compliant with his CPAP machine.    Hyperlipidemia  Continue Lipitor and fish oil  Will discontinue the niacin.    14 minutes out of 25 minutes face to face spent in counseling the patient extensively on current clinical condition and treatment plan.

## 2017-11-06 ENCOUNTER — DOCUMENTATION (OUTPATIENT)
Dept: ENDOCRINOLOGY | Age: 67
End: 2017-11-06

## 2017-11-06 NOTE — PROGRESS NOTES
To whom so ever it may concern :    Andrea Fam,67 y.o. is being treated by me for his type 2 diabetes mellitus.  Patient has had diabetes for more than 5 years now and this has affected his small nerve fibers resulting in diabetic neuropathy.  This is affecting his quality of life and his work.  He is having difficulty including buttoning his shirts, typing,Tying his shoes.  He has been on oxcarbazepine with no significant improvement in his symptoms.  I believe he would benefit from Neurontin 100 mg oral 3 times a day.  And that is the reason I'm writing this letter in order to request for the approval of this medication for the patient to improve his quality of life.    Please call our office if you have any further questions.    Thanks    Dr. Henao.

## 2017-12-26 ENCOUNTER — OFFICE VISIT (OUTPATIENT)
Dept: SLEEP MEDICINE | Facility: HOSPITAL | Age: 67
End: 2017-12-26
Attending: PSYCHIATRY & NEUROLOGY

## 2017-12-26 VITALS
HEART RATE: 66 BPM | DIASTOLIC BLOOD PRESSURE: 78 MMHG | HEIGHT: 70 IN | WEIGHT: 214 LBS | SYSTOLIC BLOOD PRESSURE: 140 MMHG | BODY MASS INDEX: 30.64 KG/M2

## 2017-12-26 DIAGNOSIS — G47.33 OSA (OBSTRUCTIVE SLEEP APNEA): Primary | ICD-10-CM

## 2017-12-26 PROCEDURE — G0463 HOSPITAL OUTPT CLINIC VISIT: HCPCS
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
## 2018-01-31 ENCOUNTER — HOSPITAL ENCOUNTER (OUTPATIENT)
Dept: SLEEP MEDICINE | Facility: HOSPITAL | Age: 68
Discharge: HOME | End: 2018-01-31
Attending: PSYCHIATRY & NEUROLOGY | Admitting: PSYCHIATRY & NEUROLOGY

## 2018-01-31 DIAGNOSIS — G47.33 OSA (OBSTRUCTIVE SLEEP APNEA): ICD-10-CM

## 2018-01-31 PROCEDURE — 95810 POLYSOM 6/> YRS 4/> PARAM: CPT

## 2018-02-01 ENCOUNTER — RESULTS ENCOUNTER (OUTPATIENT)
Dept: ENDOCRINOLOGY | Age: 68
End: 2018-02-01

## 2018-02-01 DIAGNOSIS — E11.9 TYPE 2 DIABETES MELLITUS WITHOUT COMPLICATION, WITHOUT LONG-TERM CURRENT USE OF INSULIN (HCC): ICD-10-CM

## 2018-02-01 DIAGNOSIS — E29.1 HYPOGONADISM IN MALE: ICD-10-CM

## 2018-02-01 DIAGNOSIS — E78.49 OTHER HYPERLIPIDEMIA: ICD-10-CM

## 2018-02-06 ENCOUNTER — TELEPHONE (OUTPATIENT)
Dept: SLEEP MEDICINE | Facility: HOSPITAL | Age: 68
End: 2018-02-06

## 2018-02-06 NOTE — TELEPHONE ENCOUNTER
Sending cpap setup to [facility].  Pt to return call to discuss ss results and schedule a f/u appt with dr Forbes

## 2018-03-05 ENCOUNTER — TELEPHONE (OUTPATIENT)
Dept: FAMILY MEDICINE CLINIC | Facility: CLINIC | Age: 68
End: 2018-03-05

## 2018-03-05 DIAGNOSIS — G47.33 OBSTRUCTIVE SLEEP APNEA SYNDROME: Primary | ICD-10-CM

## 2018-03-05 NOTE — TELEPHONE ENCOUNTER
Pt called he would like a referral to an [place] doctor for sleep apnea. He has a cpap machine he just needs f/u appts and to have his machine looked at.

## 2018-03-14 LAB
ALBUMIN/CREAT UR: 55.4 MG/G CREAT (ref 0–30)
BUN SERPL-MCNC: 11 MG/DL (ref 8–23)
BUN/CREAT SERPL: 11.5 (ref 7–25)
CALCIUM SERPL-MCNC: 10 MG/DL (ref 8.6–10.5)
CHLORIDE SERPL-SCNC: 95 MMOL/L (ref 98–107)
CHOLEST SERPL-MCNC: 118 MG/DL (ref 0–200)
CO2 SERPL-SCNC: 31.1 MMOL/L (ref 22–29)
CREAT SERPL-MCNC: 0.96 MG/DL (ref 0.76–1.27)
CREAT UR-MCNC: 146.8 MG/DL
FOLATE SERPL-MCNC: >20 NG/ML (ref 4.78–24.2)
GFR SERPLBLD CREATININE-BSD FMLA CKD-EPI: 77 ML/MIN/1.73
GFR SERPLBLD CREATININE-BSD FMLA CKD-EPI: 94 ML/MIN/1.73
GLUCOSE SERPL-MCNC: 120 MG/DL (ref 65–99)
HBA1C MFR BLD: 6.9 % (ref 4.8–5.6)
HCT VFR BLD AUTO: 44.7 % (ref 40.4–52.2)
HDLC SERPL-MCNC: 32 MG/DL (ref 40–60)
HGB BLD-MCNC: 15 G/DL (ref 13.7–17.6)
INTERPRETATION: NORMAL
LDLC SERPL CALC-MCNC: 55 MG/DL (ref 0–100)
Lab: NORMAL
MICROALBUMIN UR-MCNC: 81.3 UG/ML
POTASSIUM SERPL-SCNC: 4.5 MMOL/L (ref 3.5–5.2)
SODIUM SERPL-SCNC: 138 MMOL/L (ref 136–145)
TESTOST SERPL-MCNC: 250 NG/DL (ref 264–916)
TRIGL SERPL-MCNC: 153 MG/DL (ref 0–150)
TSH SERPL DL<=0.005 MIU/L-ACNC: 2.69 MIU/ML (ref 0.27–4.2)
VIT B12 SERPL-MCNC: 1470 PG/ML (ref 211–946)
VLDLC SERPL CALC-MCNC: 30.6 MG/DL (ref 5–40)

## 2018-03-27 ENCOUNTER — OFFICE VISIT (OUTPATIENT)
Dept: ENDOCRINOLOGY | Age: 68
End: 2018-03-27

## 2018-03-27 VITALS
SYSTOLIC BLOOD PRESSURE: 156 MMHG | HEIGHT: 70 IN | HEART RATE: 72 BPM | BODY MASS INDEX: 30.64 KG/M2 | OXYGEN SATURATION: 95 % | WEIGHT: 214 LBS | DIASTOLIC BLOOD PRESSURE: 88 MMHG

## 2018-03-27 DIAGNOSIS — E78.49 OTHER HYPERLIPIDEMIA: ICD-10-CM

## 2018-03-27 DIAGNOSIS — E29.1 HYPOGONADISM IN MALE: ICD-10-CM

## 2018-03-27 DIAGNOSIS — E11.9 TYPE 2 DIABETES MELLITUS WITHOUT COMPLICATION, WITHOUT LONG-TERM CURRENT USE OF INSULIN (HCC): Primary | ICD-10-CM

## 2018-03-27 PROCEDURE — 99214 OFFICE O/P EST MOD 30 MIN: CPT | Performed by: INTERNAL MEDICINE

## 2018-03-27 RX ORDER — LISINOPRIL AND HYDROCHLOROTHIAZIDE 20; 12.5 MG/1; MG/1
1 TABLET ORAL DAILY
Qty: 30 TABLET | Refills: 11 | Status: SHIPPED | OUTPATIENT
Start: 2018-03-27 | End: 2018-11-01 | Stop reason: SINTOL

## 2018-03-27 NOTE — PROGRESS NOTES
68 y.o.    Patient Care Team:  Kingsley Myers MD as PCP - General (Internal Medicine)  Asya Henao MD as Consulting Physician (Endocrinology)    Chief Complaint:    3 MONTH FOLLOW UP/ TYPE 2 DIABETES MELLITUS  Subjective     HPI    Andrea Fam 68 y.o. presents with Type 2 dm as a follow up patient.       Type 2 dm - Diagnosed about 18 years ago. Was started on oral agents initially. Insulin was started about 10 years ago.   Currently on levemir 18 units at bed time, victoza 1.8 mg subq daily, metformin 1000 mg po bid, januvia 100 mg po daily.   Checks his blood sugars one to 2 times a day.  Average blood sugars 136 mg/dl.   No increased urination or increased thirst, no nausea or vomiting.   No BG less than 60 mg/dl, does have hypoglycemic awareness. Highest blood sugar in the last 1 month was 210 mg/dL.  He forgot his log book today.   Last eye exam was in June 2017 and no history of diabetic retinopathy. S/p cataract surgery in sep 2017.   Does complain of tingling and numbness in his bilateral feet and hands, no ulcers or amputations of his feet.  Currently on oxcarbazepine.  No history of CAD, CK D, CVA per patient.  He is physically active and reports that his weight is stable.  He tries to follow diabetic diet for most part.     XtI2h-3.9%.     Low testosterone  Has been on this replacement for more than 10 years now.  Initially started by primary care physician.  Dr. Myers his current primary care physician requested me to continue this medication.  No complaints of increased urination, blood in his urine.  No history of blood clots.     Hyperlipidemia  On Lipitor 10 mg oral daily, fish oil 2 times daily.      Sleep apnea  Compliant with her CPAP machine.    Interval History      The following portions of the patient's history were reviewed and updated as appropriate: allergies, current medications, past family history, past medical history, past social history, past surgical history and problem  list.    Past Medical History:   Diagnosis Date   • Arthritis    • Cataract     HAVING SURGERY IN JUNE   • Diabetes mellitus    • High cholesterol    • Neuropathy     FINGERS AND LOWER LEGS   • Obstructive sleep apnea on CPAP    • Shoulder pain, right    • Testosterone deficiency    • Wears contact lenses      Family History   Problem Relation Age of Onset   • Alzheimer's disease Mother    • Lymphoma Paternal Grandmother      Social History     Social History   • Marital status:      Spouse name: N/A   • Number of children: N/A   • Years of education: N/A     Occupational History   • Not on file.     Social History Main Topics   • Smoking status: Former Smoker     Packs/day: 1.50     Years: 30.00     Types: Cigarettes   • Smokeless tobacco: Never Used      Comment: QUIT AGE 48   • Alcohol use Yes      Comment: 2  drinks per  month   • Drug use: No   • Sexual activity: Defer     Other Topics Concern   • Not on file     Social History Narrative   • No narrative on file     No Known Allergies    Current Outpatient Prescriptions:   •  atorvastatin (LIPITOR) 10 MG tablet, Take 10 mg by mouth Daily., Disp: , Rfl:   •  Cholecalciferol (VITAMIN D) 2000 UNITS tablet, Take 2,000 Units by mouth 2 (Two) Times a Day., Disp: , Rfl:   •  FOLIC ACID PO, Take 400 mcg by mouth 2 (Two) Times a Day. WILL HOLD PRIOR TO SURGERY, Disp: , Rfl:   •  gabapentin (NEURONTIN) 100 MG capsule, Take 1 capsule by mouth 3 (Three) Times a Day., Disp: 90 capsule, Rfl: 2  •  insulin detemir (LEVEMIR) 100 UNIT/ML injection, Inject 18 Units under the skin Every Night., Disp: , Rfl:   •  Insulin Pen Needle (NOVOFINE PLUS) 32G X 4 MM misc, Use with Insulin, Disp: 200 each, Rfl: 3  •  Liraglutide (VICTOZA) 18 MG/3ML solution pen-injector injection, Inject 1.8 mg under the skin Every Morning., Disp: 9 pen, Rfl: 3  •  metFORMIN (GLUCOPHAGE) 500 MG tablet, Take 2 tablets by mouth 2 (Two) Times a Day With Meals., Disp: 360 tablet, Rfl: 3  •  Omega-3  "Fatty Acids (FISH OIL) 1000 MG capsule capsule, Take 1,200 mg by mouth Daily With Breakfast. PT TO HOLD MED PRIOR TO OR, Disp: , Rfl:   •  SITagliptin (JANUVIA) 100 MG tablet, Take 1 tablet by mouth Daily., Disp: 90 tablet, Rfl: 3  •  Testosterone (ANDROGEL) 20.25 MG/1.25GM (1.62%) gel, Place 2 application on the skin Daily. 1 TO EACH THIGH, Disp: 1.25 g, Rfl: 5  •  vitamin B-12 (CYANOCOBALAMIN) 1000 MCG tablet, Take 1,000 mcg by mouth Every Other Day. TAKES ON ODD NUMBER DAYS WILL HOLD PRIOR TO SURGERY, Disp: , Rfl:   •  vitamin C (ASCORBIC ACID) 500 MG tablet, Take 1,000 mg by mouth 2 (Two) Times a Day. PT TO HOLD PRIOR TO SURGERY, Disp: , Rfl:   •  lisinopril-hydrochlorothiazide (PRINZIDE,ZESTORETIC) 20-12.5 MG per tablet, Take 1 tablet by mouth Daily., Disp: 30 tablet, Rfl: 11        Review of Systems   Constitutional: Negative for fever.   HENT: Negative for facial swelling, nosebleeds, trouble swallowing and voice change.    Eyes: Negative for pain and redness.   Respiratory: Negative for shortness of breath and wheezing.    Cardiovascular: Negative for palpitations and leg swelling.   Gastrointestinal: Negative for abdominal pain, diarrhea and vomiting.   Endocrine: Negative for polydipsia and polyuria.   Genitourinary: Negative for decreased urine volume and frequency.   Musculoskeletal: Negative for joint swelling and neck pain.   Skin: Negative for rash.   Allergic/Immunologic: Negative for immunocompromised state.   Neurological: Positive for numbness. Negative for seizures and facial asymmetry.   Hematological: Does not bruise/bleed easily.   Psychiatric/Behavioral: Negative for agitation and confusion.       Objective       Vitals:    03/27/18 0955   BP: 156/88   Pulse: 72   SpO2: 95%   Weight: 97.1 kg (214 lb)   Height: 177.8 cm (70\")     Body mass index is 30.71 kg/m².      Physical Exam   Gen exam - alert and oriented x 3, obese male, not in distress.   HEENT - Acanthosis nigricans. Thyroid palpable. "   Resp - Clear to auscultation.   CVS - S1,S2 heard and no murmurs.   Abd - Non tender, BS heard.   Ext - No edema and intact pin prick and proprioception.     Results Review:     I reviewed the patient's new clinical results.    Medical records reviewed  Summary: done      Results Encounter on 02/01/2018   Component Date Value Ref Range Status   • Hemoglobin A1C 03/14/2018 6.90* 4.80 - 5.60 % Final    Comment: Hemoglobin A1C Ranges:  Increased Risk for Diabetes  5.7% to 6.4%  Diabetes                     >= 6.5%  Diabetic Goal                < 7.0%     • Glucose 03/14/2018 120* 65 - 99 mg/dL Final   • BUN 03/14/2018 11  8 - 23 mg/dL Final   • Creatinine 03/14/2018 0.96  0.76 - 1.27 mg/dL Final   • eGFR Non  Am 03/14/2018 77  >60 mL/min/1.73 Final   • eGFR African Am 03/14/2018 94  >60 mL/min/1.73 Final   • BUN/Creatinine Ratio 03/14/2018 11.5  7.0 - 25.0 Final   • Sodium 03/14/2018 138  136 - 145 mmol/L Final   • Potassium 03/14/2018 4.5  3.5 - 5.2 mmol/L Final   • Chloride 03/14/2018 95* 98 - 107 mmol/L Final   • Total CO2 03/14/2018 31.1* 22.0 - 29.0 mmol/L Final   • Calcium 03/14/2018 10.0  8.6 - 10.5 mg/dL Final   • Total Cholesterol 03/14/2018 118  0 - 200 mg/dL Final   • Triglycerides 03/14/2018 153* 0 - 150 mg/dL Final   • HDL Cholesterol 03/14/2018 32* 40 - 60 mg/dL Final   • VLDL Cholesterol 03/14/2018 30.6  5 - 40 mg/dL Final   • LDL Cholesterol  03/14/2018 55  0 - 100 mg/dL Final   • Creatinine, Urine 03/14/2018 146.8  Not Estab. mg/dL Final   • Microalbumin, Urine 03/14/2018 81.3  Not Estab. ug/mL Final   • Microalbumin/Creatinine Ratio 03/14/2018 55.4* 0.0 - 30.0 mg/g creat Final   • TSH 03/14/2018 2.690  0.270 - 4.200 mIU/mL Final   • Vitamin B-12 03/14/2018 1470* 211 - 946 pg/mL Final   • Folate 03/14/2018 >20.00  4.78 - 24.20 ng/mL Final   • Testosterone, Total 03/14/2018 250* 264 - 916 ng/dL Final    Comment: Adult male reference interval is based on a population of  healthy nonobese males  (BMI <30) between 19 and 39 years old.  inga Marquez.al. JC 2017,102;7691-6654. PMID: 88320891.     • Hemoglobin 03/14/2018 15.0  13.7 - 17.6 g/dL Final   • Hematocrit 03/14/2018 44.7  40.4 - 52.2 % Final   • Interpretation 03/14/2018 Note   Final   • PDF Image 03/14/2018 Not applicable   Final     Lab Results   Component Value Date    HGBA1C 6.90 (H) 03/13/2018    HGBA1C 6.90 (H) 09/07/2017    HGBA1C 6.51 (H) 05/31/2017     Lab Results   Component Value Date    MICROALBUR 81.3 03/13/2018    CREATININE 0.96 03/13/2018     Imaging Results (most recent)     None                Assessment and Plan:    Andrea was seen today for diabetes.    Diagnoses and all orders for this visit:    Type 2 diabetes mellitus without complication, without long-term current use of insulin  -     Hemoglobin A1c; Future  -     Basic Metabolic Panel; Future  -     Lipid Panel; Future  -     Microalbumin / Creatinine Urine Ratio - Urine, Clean Catch; Future  -     TSH; Future  -     Vitamin B12 & Folate; Future    Other hyperlipidemia  -     Hemoglobin A1c; Future  -     Basic Metabolic Panel; Future  -     Lipid Panel; Future  -     Microalbumin / Creatinine Urine Ratio - Urine, Clean Catch; Future  -     TSH; Future  -     Vitamin B12 & Folate; Future    Hypogonadism in male  -     Hemoglobin A1c; Future  -     Basic Metabolic Panel; Future  -     Lipid Panel; Future  -     Microalbumin / Creatinine Urine Ratio - Urine, Clean Catch; Future  -     TSH; Future  -     Vitamin B12 & Folate; Future    Other orders  -     lisinopril-hydrochlorothiazide (PRINZIDE,ZESTORETIC) 20-12.5 MG per tablet; Take 1 tablet by mouth Daily.    Type 2 diabetes mellitus  HbA1c well controlled  Continue the current insulin regimen  Continue the current oral hypoglycemic agents    Hyperlipidemia  Continue Lipitor and over-the-counter fish or  Hypogonadism  Managed by primary care  Noted that patient testosterone levels are slightly low, advised the patient to  discuss with primary if it is okay for him to increase the dosage of his AndroGel to twice daily applications.    High blood pressure  Patient's blood pressure was elevated today in the clinic  He is not on any blood pressure medications  Will start him on lisinopril-hydrochlorothiazide 1 tablet daily.    Reviewed Lab results with the patient.       The total face to face time spent 14minutes with the patient,25minutes (greater than 50% of the total time) was spent counseling and coordination of the care on lab workup results, medication changes and treatment plan.

## 2018-04-12 ENCOUNTER — TELEPHONE (OUTPATIENT)
Dept: ENDOCRINOLOGY | Age: 68
End: 2018-04-12

## 2018-05-16 ENCOUNTER — OFFICE VISIT (OUTPATIENT)
Dept: SLEEP MEDICINE | Facility: HOSPITAL | Age: 68
End: 2018-05-16
Attending: INTERNAL MEDICINE

## 2018-05-16 DIAGNOSIS — G47.33 OBSTRUCTIVE SLEEP APNEA SYNDROME: ICD-10-CM

## 2018-05-16 DIAGNOSIS — G47.33 OSA (OBSTRUCTIVE SLEEP APNEA): Primary | ICD-10-CM

## 2018-05-16 PROCEDURE — G0463 HOSPITAL OUTPT CLINIC VISIT: HCPCS

## 2018-05-16 PROCEDURE — 99203 OFFICE O/P NEW LOW 30 MIN: CPT | Performed by: INTERNAL MEDICINE

## 2018-05-16 NOTE — PROGRESS NOTES
[facility] New Patient[facility]ultation       Reason for Consultation: LISANDRA    Patient Care Team:  Ulisses Campoverde MD as PCP - General (Family Medicine)  Asya Henao MD as Consulting Physician (Endocrinology)  John Mcguire MD as Consulting Physician (Sleep Medicine)    Chief complaint:  LISANDRA    History of present illness:  Thank you for asking me to see your patient.  The patient is a 68 y.o. male who has a history of LISANDRA dating back 20 years.  The patient was last seen here in December 2017.  A repeat overnight polysomnogram January 31, 2018 performed.  Weight 214 pounds.  Mild obstructive sleep apnea with AHI 5.2 per hour noted.  He also had mild respiratory effort-related arousals of 9 per hour.  No sleep-related hypoxemia.  The patient's snore 56% of his total sleep time and arousals related to snoring was abnormal at 9 per hour.    The patient reports no changes in his past medical history since last seen in December 2017.    Review of Systems:  Recorded on the Sleep Questionnaire.  Unremarkable.    History:  Past Medical History:   Diagnosis Date   • Arthritis    • Cataract     HAVING SURGERY IN JUNE   • Diabetes mellitus    • High cholesterol    • Neuropathy     FINGERS AND LOWER LEGS   • Obstructive sleep apnea on CPAP    • Shoulder pain, right    • Testosterone deficiency    • Wears contact lenses    ,   Past Surgical History:   Procedure Laterality Date   • BACK SURGERY      L5   • ROTATOR CUFF REPAIR Left    • SEPTOPLASTY     • TOTAL SHOULDER ARTHROPLASTY W/ DISTAL CLAVICLE EXCISION Right 4/13/2017    Procedure: RT TOTAL SHOULDER REVERSE ARTHROPLASTY;  Surgeon: Jamal Prince MD;  [place] [facility] [place] [facility] [place]:    ,   Family History   Problem Relation Age of Onset   • Alzheimer's disease Mother    • Lymphoma Paternal Grandmother     and   Social History   Substance Use Topics   • Smoking status: Former Smoker     Packs/day: 1.50     Years: 30.00     Types: Cigarettes   •  Smokeless tobacco: Never Used      Comment: QUIT AGE 48   • Alcohol use Yes      Comment: 2  drinks per  month       Social History:  2 caffeinated beverages a day.    Allergies:  Patient has no known allergies.     Medication Review: I reviewed his list.    Vital Signs: Height 70 inches and his weight has decreased from 214 down to 205 pounds and he is borderline obese with a body mass index 29-30    Physical Exam:  Recorded on the [facility] Physical Exam Form and is unremarkable except for:  A large tongue and normal uvula that is long and tapered and mild-moderate narrowing of the posterior pharyngeal opening and class II-III MP airway     Results Review:  I reviewed his overnight polysomnogram from January of this year.    Downloads between April 15 and May 14, 2018 compliances 100% and average usage is 8 hours and 30 minutes and AHI is normal without leak and 95th percentile pressure is 8.3 and his auto CPAP is 5-15       Impression:   Obstructive sleep apnea that is adequately treated with auto titrating CPAP with good compliance and usage and no complaints of hypersomnolence.  Phelps Sleepiness Scale is normal at 4.    Plan:  Good sleep hygiene measures should be maintained.  Weight loss would be beneficial in this patient who is borderline obese.    All reviewed with the patient.  He is doing well and will continue treatment with auto CPAP.  I will see him back in one year.     Thank you for requesting me to assist in this patient's care.    John Mcguire MD  Sleep Medicine  05/20/18  9:44 AM

## 2018-06-08 ENCOUNTER — OFFICE VISIT (OUTPATIENT)
Dept: FAMILY MEDICINE CLINIC | Facility: CLINIC | Age: 68
End: 2018-06-08

## 2018-06-08 VITALS
BODY MASS INDEX: 29.49 KG/M2 | TEMPERATURE: 98.1 F | HEART RATE: 74 BPM | WEIGHT: 206 LBS | HEIGHT: 70 IN | SYSTOLIC BLOOD PRESSURE: 122 MMHG | RESPIRATION RATE: 16 BRPM | OXYGEN SATURATION: 98 % | DIASTOLIC BLOOD PRESSURE: 84 MMHG

## 2018-06-08 DIAGNOSIS — Z23 NEED FOR TDAP VACCINATION: ICD-10-CM

## 2018-06-08 DIAGNOSIS — Z23 NEED FOR PNEUMOCOCCAL VACCINATION: ICD-10-CM

## 2018-06-08 DIAGNOSIS — G62.9 PERIPHERAL NERVE DISEASE: ICD-10-CM

## 2018-06-08 DIAGNOSIS — E11.42 TYPE 2 DIABETES MELLITUS WITH DIABETIC POLYNEUROPATHY, UNSPECIFIED LONG TERM INSULIN USE STATUS: Primary | ICD-10-CM

## 2018-06-08 PROCEDURE — 90670 PCV13 VACCINE IM: CPT | Performed by: FAMILY MEDICINE

## 2018-06-08 PROCEDURE — 90715 TDAP VACCINE 7 YRS/> IM: CPT | Performed by: FAMILY MEDICINE

## 2018-06-08 PROCEDURE — 99213 OFFICE O/P EST LOW 20 MIN: CPT | Performed by: FAMILY MEDICINE

## 2018-06-08 PROCEDURE — G0009 ADMIN PNEUMOCOCCAL VACCINE: HCPCS | Performed by: FAMILY MEDICINE

## 2018-06-08 PROCEDURE — 90471 IMMUNIZATION ADMIN: CPT | Performed by: FAMILY MEDICINE

## 2018-06-08 RX ORDER — OXCARBAZEPINE 300 MG/1
600 TABLET, FILM COATED ORAL DAILY
COMMUNITY
End: 2018-07-11 | Stop reason: SDUPTHER

## 2018-06-08 NOTE — PROGRESS NOTES
Subjective   Andrea Fam is a 68 y.o. male.     The patient to me transferring from Dr. Myers.  Here for type 2 diabetes and health maintenance.  Has had well-controlled type 2 diabetes.  Last A1c was about 6.8.  Diabetic for over 20 years.  Bothered significantly with peripheral neuropathy.  Has decreased sensation and burning dysesthesias in the feet.  The fingertips are starting to cause some difficulty.  Has had pr insulin, I Yobani to, metformin and city Tim.  eserved renal function.  No coronary disease or other end organ damage.  Lipids have been reasonably well-controlled on atorvastatin.  He is on insulin, liraglutide, metformin and sitagliptin.  Sees endocrine every 3-4 months.  Labs are followed closely.  Also given his testosterone by endo.  He is working on increasing activity at the gym.  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  He's having no difficulties with his medicines.  Intentionally maintaining testosterone level on low side.  He's happy with where it is.  Has had a colonoscopy and one pneumonia vaccine.  Needs tetanus.         The following portions of the patient's history were reviewed and updated as appropriate: allergies, current medications, past family history, past medical history, past social history, past surgical history and problem list.    Review of Systems   Constitutional: Negative.  Negative for chills and fever.   HENT: Negative.  Negative for congestion, rhinorrhea and sore throat.    Eyes: Negative.  Negative for discharge and visual disturbance.   Respiratory: Negative.  Negative for cough and shortness of breath.    Cardiovascular: Negative.  Negative for chest pain.   Gastrointestinal: Negative.  Negative for abdominal pain, constipation, diarrhea, nausea and vomiting.   Endocrine: Negative.  Negative for polydipsia.   Genitourinary: Negative.  Negative for dysuria and  hematuria.   Musculoskeletal: Negative.  Negative for arthralgias and myalgias.   Skin: Negative.  Negative for rash.   Allergic/Immunologic: Negative.    Neurological: Positive for numbness (plus burning). Negative for weakness and headaches.   Hematological: Negative.  Does not bruise/bleed easily.   Psychiatric/Behavioral: Negative.  Negative for dysphoric mood. The patient is not nervous/anxious.    All other systems reviewed and are negative.      Objective   Physical Exam   Constitutional: He is oriented to person, place, and time. He appears well-developed and well-nourished.   HENT:   Head: Normocephalic and atraumatic.   Right Ear: External ear normal.   Left Ear: External ear normal.   Mouth/Throat: No oropharyngeal exudate.   Eyes: Conjunctivae, EOM and lids are normal. Pupils are equal, round, and reactive to light. Right eye exhibits no discharge. Left eye exhibits no discharge. No scleral icterus.   Neck: Trachea normal, normal range of motion and phonation normal. Neck supple. No thyromegaly present.   Cardiovascular: Normal rate, regular rhythm and normal heart sounds.  Exam reveals no gallop and no friction rub.    No murmur heard.  Pulmonary/Chest: Effort normal and breath sounds normal. No stridor. He has no wheezes. He has no rales.   Abdominal: Soft. He exhibits no distension. There is no tenderness.   Musculoskeletal: Normal range of motion. He exhibits no edema.   Lymphadenopathy:     He has no cervical adenopathy.   Neurological: He is alert and oriented to person, place, and time. He has normal strength. No cranial nerve deficit.   Skin: Skin is warm, dry and intact. No petechiae and no rash noted. No cyanosis. Nails show no clubbing.   Psychiatric: He has a normal mood and affect. His speech is normal and behavior is normal. Judgment and thought content normal. Cognition and memory are normal.   Nursing note and vitals reviewed.    Reviewed recent records.  Testosterone is running around  250 and his last A1c was 6.9.  He gets regular ophthalmology exams.  We will locate the date of his last colonoscopy  Discussed using alpha lipoid acid for his neuropathy but stressed that most important factor with diabetic neuropathy was good glycemic control.  We discussed appropriate exercise involving both aerobic and resistance training components.  Commended some reading for him.  He doesn't need any additional laboratory studies today but should have pneumococcal vaccine and Tdap.    Assessment/Plan   Andrea was seen today for establish care and diabetes.    Diagnoses and all orders for this visit:    Type 2 diabetes mellitus with diabetic polyneuropathy, unspecified long term insulin use status    Need for pneumococcal vaccination    Need for Tdap vaccination    Peripheral nerve disease    Other orders  -     Pneumococcal Conjugate Vaccine 13-Valent All  -     Tdap Vaccine Greater Than or Equal To 8yo IM      Patient Instructions   Alpha Lipoic Acid with Biotin  (600mg/1000 mcg)  (one source is [email or web address])    Read YOUNGER NEXT YEAR    Have any doctor outside the [facility] system send us reports    You were given a tetanus/diphtheria/pertussis booster vaccine today.  This is your once in a lifetime pertussis (whooping cough) booster.  In the future you will need only tetanus/diphtheria.    You were given Prevnar today,a pneumonia vaccine              EMR Dragon/Transcription disclaimer:   Much of this encounter note is an electronic transcription/translation of spoken language to printed text. The electronic translation of spoken language may permit erroneous, or at times, nonsensical words or phrases to be inadvertently transcribed; Although I have reviewed the note for such errors, some may still exist. Please contact me with any questions or concerns about the conduct of this encounter note.

## 2018-06-08 NOTE — PATIENT INSTRUCTIONS
Alpha Lipoic Acid with Biotin  (600mg/1000 mcg)  (one source is [email or web address])    Read YOUNGER NEXT YEAR    Have any doctor outside the [facility] system send us reports    You were given a tetanus/diphtheria/pertussis booster vaccine today.  This is your once in a lifetime pertussis (whooping cough) booster.  In the future you will need only tetanus/diphtheria.    You were given Prevnar today,a pneumonia vaccine

## 2018-06-13 ENCOUNTER — DOCUMENTATION (OUTPATIENT)
Dept: FAMILY MEDICINE CLINIC | Facility: CLINIC | Age: 68
End: 2018-06-13

## 2018-06-13 NOTE — PROGRESS NOTES
Reviewe of old records confirms colonoscopy in April 2013 but we are unable to locate any evidence of pneumococcal vaccine.

## 2018-06-25 ENCOUNTER — RESULTS ENCOUNTER (OUTPATIENT)
Dept: ENDOCRINOLOGY | Age: 68
End: 2018-06-25

## 2018-06-25 DIAGNOSIS — E11.9 TYPE 2 DIABETES MELLITUS WITHOUT COMPLICATION, WITHOUT LONG-TERM CURRENT USE OF INSULIN (HCC): ICD-10-CM

## 2018-06-25 DIAGNOSIS — E78.49 OTHER HYPERLIPIDEMIA: ICD-10-CM

## 2018-06-25 DIAGNOSIS — E29.1 HYPOGONADISM IN MALE: ICD-10-CM

## 2018-07-10 ENCOUNTER — TELEPHONE (OUTPATIENT)
Dept: FAMILY MEDICINE CLINIC | Facility: CLINIC | Age: 68
End: 2018-07-10

## 2018-07-11 RX ORDER — OXCARBAZEPINE 300 MG/1
600 TABLET, FILM COATED ORAL DAILY
Qty: 42 TABLET | Refills: 0 | Status: SHIPPED | OUTPATIENT
Start: 2018-07-11 | End: 2018-07-16 | Stop reason: SDUPTHER

## 2018-07-11 RX ORDER — OXCARBAZEPINE 300 MG/1
600 TABLET, FILM COATED ORAL DAILY
Qty: 270 TABLET | Refills: 3 | Status: SHIPPED | OUTPATIENT
Start: 2018-07-11 | End: 2018-07-11 | Stop reason: SDUPTHER

## 2018-07-11 NOTE — TELEPHONE ENCOUNTER
Recommend Centrum Silver for Men recommended--store brand equivalent is fine. Please sign up for [employer]

## 2018-07-11 NOTE — TELEPHONE ENCOUNTER
Medication has been sent to patient [facility] and [facility] for short order supply at patient request      ----- Message from Angelina Pancho sent at 7/10/2018 11:00 AM EDT -----  Contact: PATIENT   PATIENT   REQUEST TO RE FILL  MEDICATION:    MEDICATION: OXcarbazepine (TRILEPTAL) tablet 600 mg     MESSAGE:  PATIENT HAS CALLED IN REGARDS TO GETTING THE ABOVE MEDICATION TO BE SENT IN TO TWO DIFFERENT PHARMACY     PATIENT IS REQUESTING WE SENT THE FOLLOWING MEDICATION TO [facility].  THE PATIENT WAS GETTING THIS MEDICATION FROM ANOTHER PROVIDER AND NEED A NEW PRESCRIPTION  BE SENT OVER.      PLEASE SENT  THE [employer] - [address] [phone] [facility] [phone] [facility] TO THE FOLLOWING PHARMACY      PATIENT IS ALSO REQUEST A 14 DAY SUPPLY BE SENT TO THE FOLLOWING PHARMACY, WHILE [employer] SEND OUT HIS MEDICATION    [facility]  [place] [address]      ANY ISSUES PLEASE CALL THE PATIENT DIRECTLY     PHONE NUMBER:[phone]

## 2018-07-16 RX ORDER — OXCARBAZEPINE 300 MG/1
TABLET, FILM COATED ORAL
Qty: 270 TABLET | Refills: 3 | Status: SHIPPED | OUTPATIENT
Start: 2018-07-16 | End: 2018-08-06

## 2018-07-26 LAB
ALBUMIN/CREAT UR: 10.3 MG/G CREAT (ref 0–30)
BUN SERPL-MCNC: 16 MG/DL (ref 8–23)
BUN/CREAT SERPL: 15.5 (ref 7–25)
CALCIUM SERPL-MCNC: 10.5 MG/DL (ref 8.6–10.5)
CHLORIDE SERPL-SCNC: 95 MMOL/L (ref 98–107)
CHOLEST SERPL-MCNC: 129 MG/DL (ref 0–200)
CO2 SERPL-SCNC: 30 MMOL/L (ref 22–29)
CREAT SERPL-MCNC: 1.03 MG/DL (ref 0.76–1.27)
CREAT UR-MCNC: 151 MG/DL
FOLATE SERPL-MCNC: >20 NG/ML (ref 4.78–24.2)
GLUCOSE SERPL-MCNC: 174 MG/DL (ref 65–99)
HBA1C MFR BLD: 6.8 % (ref 4.8–5.6)
HDLC SERPL-MCNC: 35 MG/DL (ref 40–60)
INTERPRETATION: NORMAL
LDLC SERPL CALC-MCNC: 65 MG/DL (ref 0–100)
Lab: NORMAL
MICROALBUMIN UR-MCNC: 15.6 UG/ML
POTASSIUM SERPL-SCNC: 4.9 MMOL/L (ref 3.5–5.2)
SODIUM SERPL-SCNC: 140 MMOL/L (ref 136–145)
TRIGL SERPL-MCNC: 147 MG/DL (ref 0–150)
TSH SERPL DL<=0.005 MIU/L-ACNC: 2.19 MIU/ML (ref 0.27–4.2)
VIT B12 SERPL-MCNC: 1063 PG/ML (ref 211–946)
VLDLC SERPL CALC-MCNC: 29.4 MG/DL (ref 5–40)

## 2018-08-06 ENCOUNTER — OFFICE VISIT (OUTPATIENT)
Dept: ENDOCRINOLOGY | Age: 68
End: 2018-08-06

## 2018-08-06 VITALS
BODY MASS INDEX: 29.49 KG/M2 | WEIGHT: 206 LBS | SYSTOLIC BLOOD PRESSURE: 122 MMHG | HEIGHT: 70 IN | HEART RATE: 78 BPM | DIASTOLIC BLOOD PRESSURE: 64 MMHG | OXYGEN SATURATION: 95 %

## 2018-08-06 DIAGNOSIS — E11.9 TYPE 2 DIABETES MELLITUS WITHOUT COMPLICATION, WITHOUT LONG-TERM CURRENT USE OF INSULIN (HCC): Primary | ICD-10-CM

## 2018-08-06 DIAGNOSIS — E55.9 VITAMIN D DEFICIENCY: ICD-10-CM

## 2018-08-06 DIAGNOSIS — E78.2 MIXED HYPERLIPIDEMIA: ICD-10-CM

## 2018-08-06 DIAGNOSIS — R39.15 URGENCY OF URINATION: ICD-10-CM

## 2018-08-06 DIAGNOSIS — E29.1 HYPOGONADISM IN MALE: ICD-10-CM

## 2018-08-06 DIAGNOSIS — R39.11 HESITANCY OF MICTURITION: ICD-10-CM

## 2018-08-06 PROCEDURE — 99214 OFFICE O/P EST MOD 30 MIN: CPT | Performed by: INTERNAL MEDICINE

## 2018-08-06 RX ORDER — OXCARBAZEPINE 600 MG/1
TABLET, FILM COATED ORAL
Qty: 90 TABLET | Refills: 3 | Status: SHIPPED | OUTPATIENT
Start: 2018-08-06 | End: 2018-12-10 | Stop reason: SDUPTHER

## 2018-08-06 NOTE — PROGRESS NOTES
68 y.o.    Patient Care Team:  Ulisses Campoverde MD as PCP - General (Family Medicine)  Asya Henao MD as Consulting Physician (Endocrinology)  John Mcguire MD as Consulting Physician (Pulmonary Disease)  Tamia Carrillo MD as Consulting Physician (Ophthalmology)  Jamal Prince MD as Consulting Physician (Orthopedic Surgery)    Chief Complaint:    6 MONTH FOLLOW UP/ TYPE 2 DIABETES MELLITUS  Subjective     HPI     Andrea Fam 68 y.o. presents with Type 2 dm as a follow up patient.       Type 2 dm - Diagnosed about 18 years ago. Was started on oral agents initially. Insulin was started about 10 years ago.   Today in clinic pt reports he is on levemir 18 units at bed time, Victoza 1.8 mg subQ daily, Januvia 100 mg po daily, Metformin 1000 mg po bid.   No abd pain, no N/v, No stomach cramps, no diarrhea.   Hasnt been checking BG for the last 6 months.   Doesn't know about high or low BG as he hasnt been checking.   No increased thirst or increased urination.   Last eye exam was in 6 months, no dm retinopathy. S/p cataract surgery in Sep 2017.   Does complain of tingling and numbness in his B/l feet and hands, no ulcers or amputations of his feet. Currently on oxcarbazepine.  No history of CAD, CK D, CVA per patient.  He is physically active and reports that his weight is stable.  He tries to follow diabetic diet for most part.     RwQ8n-8.9%.     Low testosterone  Has been on this replacement for more than 10 years now.  Initially started by primary care physician.  Dr. Myers his current primary care physician requested me to continue this medication.  Currently androgel 1.62% - 2 application on skin daily. Doesn't think he had a recent prostate exam.   No complaints of increased urination, blood in his urine.  No history of blood clots.     Hyperlipidemia  On Lipitor 10 mg oral daily, fish oil 2 times daily.      Sleep apnea  Compliant with her CPAP machine.       Reviewed primary care  physician's/consulting physician documentation and lab results :     Interval History      The following portions of the patient's history were reviewed and updated as appropriate: allergies, current medications, past family history, past medical history, past social history, past surgical history and problem list.    Past Medical History:   Diagnosis Date   • Arthritis    • Cataract     HAVING SURGERY IN JUNE   • High cholesterol    • Obstructive sleep apnea on CPAP    • Peripheral neuropathy     FINGERS AND LOWER LEGS   • Shoulder pain, right    • Testosterone deficiency    • Type 2 diabetes mellitus (CMS/HCC)      Family History   Problem Relation Age of Onset   • Alzheimer's disease Mother    • Lymphoma Paternal Grandmother    • Cerebral aneurysm Paternal Grandfather    • Colon cancer Neg Hx      Social History     Social History   • Marital status:      Spouse name: N/A   • Number of children: N/A   • Years of education: N/A     Occupational History   • Not on file.     Social History Main Topics   • Smoking status: Former Smoker     Packs/day: 1.50     Years: 30.00     Types: Cigarettes   • Smokeless tobacco: Never Used      Comment: QUIT AGE 48   • Alcohol use Yes      Comment: 2  drinks per  month   • Drug use: No   • Sexual activity: Defer     Other Topics Concern   • Not on file     Social History Narrative   • No narrative on file     No Known Allergies    Current Outpatient Prescriptions:   •  atorvastatin (LIPITOR) 10 MG tablet, Take 10 mg by mouth Daily., Disp: , Rfl:   •  Cholecalciferol (VITAMIN D) 2000 UNITS tablet, Take 2,000 Units by mouth 2 (Two) Times a Day., Disp: , Rfl:   •  FOLIC ACID PO, Take 400 mcg by mouth 2 (Two) Times a Day. WILL HOLD PRIOR TO SURGERY, Disp: , Rfl:   •  insulin detemir (LEVEMIR) 100 UNIT/ML injection, Inject 18 Units under the skin Every Night., Disp: , Rfl:   •  Insulin Pen Needle (NOVOFINE PLUS) 32G X 4 MM misc, Use with Insulin, Disp: 200 each, Rfl: 3  •   "Liraglutide (VICTOZA) 18 MG/3ML solution pen-injector injection, Inject 1.8 mg under the skin Every Morning., Disp: 9 pen, Rfl: 3  •  lisinopril-hydrochlorothiazide (PRINZIDE,ZESTORETIC) 20-12.5 MG per tablet, Take 1 tablet by mouth Daily., Disp: 30 tablet, Rfl: 11  •  metFORMIN (GLUCOPHAGE) 500 MG tablet, Take 2 tablets by mouth 2 (Two) Times a Day With Meals., Disp: 360 tablet, Rfl: 3  •  Omega-3 Fatty Acids (FISH OIL) 1000 MG capsule capsule, Take 1,200 mg by mouth Daily With Breakfast. PT TO HOLD MED PRIOR TO OR, Disp: , Rfl:   •  SITagliptin (JANUVIA) 100 MG tablet, Take 1 tablet by mouth Daily., Disp: 90 tablet, Rfl: 3  •  Testosterone (ANDROGEL) 20.25 MG/1.25GM (1.62%) gel, Place 2 application on the skin Daily. 1 TO EACH THIGH, Disp: 1.25 g, Rfl: 5  •  vitamin B-12 (CYANOCOBALAMIN) 1000 MCG tablet, Take 1,000 mcg by mouth Every Other Day. TAKES ON ODD NUMBER DAYS WILL HOLD PRIOR TO SURGERY, Disp: , Rfl:   •  vitamin C (ASCORBIC ACID) 500 MG tablet, Take 1,000 mg by mouth 2 (Two) Times a Day. PT TO HOLD PRIOR TO SURGERY, Disp: , Rfl:   •  OXcarbazepine (TRILEPTAL) 600 MG tablet, TAKE 1 TABLET IN A.M AND 1.5 IN P.M, Disp: 90 tablet, Rfl: 3        Review of Systems   Constitutional: Positive for fatigue. Negative for appetite change and fever.   Eyes: Negative for visual disturbance.   Respiratory: Negative for shortness of breath.    Cardiovascular: Negative for palpitations and leg swelling.   Gastrointestinal: Negative for abdominal pain and vomiting.   Endocrine: Negative for polydipsia and polyuria.   Musculoskeletal: Negative for joint swelling and neck pain.   Skin: Negative for rash.   Neurological: Positive for weakness and numbness.   Psychiatric/Behavioral: Negative for behavioral problems.       Objective       Vitals:    08/06/18 1155   BP: 122/64   Pulse: 78   SpO2: 95%   Weight: 93.4 kg (206 lb)   Height: 176.5 cm (69.5\")     Body mass index is 29.98 kg/m².      Physical Exam   Constitutional: He " is oriented to person, place, and time. He appears well-nourished.   obese   HENT:   Head: Normocephalic and atraumatic.   Wide neck   Eyes: Conjunctivae and EOM are normal.   Neck: Normal range of motion. Neck supple. Carotid bruit is not present. No thyromegaly present.   Acanthosis nigricans   Cardiovascular: Normal rate and normal heart sounds.    Pulmonary/Chest: Effort normal and breath sounds normal. No stridor. No respiratory distress.   Abdominal: Soft. Bowel sounds are normal. He exhibits no distension. There is no tenderness.   Central obesity   Musculoskeletal: He exhibits no edema or tenderness.   Neurological: He is alert and oriented to person, place, and time.   Skin: Skin is warm and dry.   Psychiatric: He has a normal mood and affect. His behavior is normal.   Vitals reviewed.    Results Review:     I reviewed the patient's new clinical results and mentioned them above in HPI and in plan as well.    Medical records reviewed  Summary: done      Results Encounter on 06/25/2018   Component Date Value Ref Range Status   • Hemoglobin A1C 07/25/2018 6.80* 4.80 - 5.60 % Final    Comment: Hemoglobin A1C Ranges:  Increased Risk for Diabetes  5.7% to 6.4%  Diabetes                     >= 6.5%  Diabetic Goal                < 7.0%     • Glucose 07/25/2018 174* 65 - 99 mg/dL Final   • BUN 07/25/2018 16  8 - 23 mg/dL Final   • Creatinine 07/25/2018 1.03  0.76 - 1.27 mg/dL Final   • eGFR Non  Am 07/25/2018 71  >60 mL/min/1.73 Final   • eGFR African Am 07/25/2018 87  >60 mL/min/1.73 Final   • BUN/Creatinine Ratio 07/25/2018 15.5  7.0 - 25.0 Final   • Sodium 07/25/2018 140  136 - 145 mmol/L Final   • Potassium 07/25/2018 4.9  3.5 - 5.2 mmol/L Final   • Chloride 07/25/2018 95* 98 - 107 mmol/L Final   • Total CO2 07/25/2018 30.0* 22.0 - 29.0 mmol/L Final   • Calcium 07/25/2018 10.5  8.6 - 10.5 mg/dL Final   • Total Cholesterol 07/25/2018 129  0 - 200 mg/dL Final   • Triglycerides 07/25/2018 147  0 - 150 mg/dL  Final   • HDL Cholesterol 07/25/2018 35* 40 - 60 mg/dL Final   • VLDL Cholesterol 07/25/2018 29.4  5 - 40 mg/dL Final   • LDL Cholesterol  07/25/2018 65  0 - 100 mg/dL Final   • Creatinine, Urine 07/25/2018 151.0  Not Estab. mg/dL Final   • Microalbumin, Urine 07/25/2018 15.6  Not Estab. ug/mL Final   • Microalbumin/Creatinine Ratio 07/25/2018 10.3  0.0 - 30.0 mg/g creat Final   • TSH 07/25/2018 2.190  0.270 - 4.200 mIU/mL Final   • Vitamin B-12 07/25/2018 1063* 211 - 946 pg/mL Final   • Folate 07/25/2018 >20.00  4.78 - 24.20 ng/mL Final   • Interpretation 07/25/2018 Note   Final    Supplemental report is available.   • PDF Image 07/25/2018 Not applicable   Final     Lab Results   Component Value Date    HGBA1C 6.80 (H) 07/25/2018    HGBA1C 6.90 (H) 03/13/2018    HGBA1C 6.90 (H) 09/07/2017     Lab Results   Component Value Date    MICROALBUR 15.6 07/25/2018    CREATININE 1.03 07/25/2018     Imaging Results (most recent)     None                Assessment and Plan:    Andrea was seen today for diabetes.    Diagnoses and all orders for this visit:    Type 2 diabetes mellitus without complication, without long-term current use of insulin (CMS/[facility])  -     TSH; Future  -     T4, Free; Future  -     Hemoglobin A1c; Future  -     Basic Metabolic Panel; Future  -     Lipid Panel; Future  -     TSH; Future  -     Vitamin B12 & Folate; Future  -     Vitamin D 25 Hydroxy; Future  -     Testosterone; Future  -     PSA DIAGNOSTIC; Future  -     Hemoglobin & Hematocrit, Blood; Future  -     Testosterone  -     PSA DIAGNOSTIC; Future  -     Hemoglobin & Hematocrit, Blood    Mixed hyperlipidemia  -     TSH; Future  -     T4, Free; Future  -     Hemoglobin A1c; Future  -     Basic Metabolic Panel; Future  -     Lipid Panel; Future  -     TSH; Future  -     Vitamin B12 & Folate; Future  -     Vitamin D 25 Hydroxy; Future  -     Testosterone; Future  -     PSA DIAGNOSTIC; Future  -     Hemoglobin & Hematocrit, Blood; Future  -      Testosterone  -     PSA DIAGNOSTIC; Future  -     Hemoglobin & Hematocrit, Blood    Hypogonadism in male  -     TSH; Future  -     T4, Free; Future  -     Hemoglobin A1c; Future  -     Basic Metabolic Panel; Future  -     Lipid Panel; Future  -     TSH; Future  -     Vitamin B12 & Folate; Future  -     Vitamin D 25 Hydroxy; Future  -     Testosterone; Future  -     PSA DIAGNOSTIC; Future  -     Hemoglobin & Hematocrit, Blood; Future  -     Testosterone  -     PSA DIAGNOSTIC; Future  -     Hemoglobin & Hematocrit, Blood    Vitamin D deficiency   -     TSH; Future  -     T4, Free; Future  -     Hemoglobin A1c; Future  -     Basic Metabolic Panel; Future  -     Lipid Panel; Future  -     TSH; Future  -     Vitamin B12 & Folate; Future  -     Vitamin D 25 Hydroxy; Future  -     Testosterone; Future  -     PSA DIAGNOSTIC; Future  -     Hemoglobin & Hematocrit, Blood; Future  -     Testosterone  -     PSA DIAGNOSTIC; Future  -     Hemoglobin & Hematocrit, Blood    Hesitancy of micturition   -     PSA DIAGNOSTIC; Future    Urgency of urination   -     PSA DIAGNOSTIC; Future    Other orders  -     OXcarbazepine (TRILEPTAL) 600 MG tablet; TAKE 1 TABLET IN A.M AND 1.5 IN P.M      Type 2 diabetes mellitus-decently controlled  Continue the current insulin regimen  Continue the current oral hypoglycemic agents  Emphasized to the patient strictly that he needs to check his blood sugars at least 1-2 times a day.  Discussed the benefits of checking his blood sugars and risks of not doing the same.    Hypogonadism  Will check testosterone panel, PSA levels  Will check hemoglobin and hematocrit levels.  Discussed with the patient that he needs to have a dedicated prostate examination to his primary care physician in the next 6 months and if that does not happen he needs to alert me so that way I could refer him to the urologist.    Hyperlipidemia  Continue Lipitor 10 mg oral daily, continue the fish oil.    Diabetic  "neuropathy  Continue Trileptal.    Reviewed Lab results with the patient.             Asya Henao MD  08/06/18    EMR Dragon / transcription disclaimer:     \"Dictated utilizing Dragon dictation\".  "

## 2018-08-07 LAB
HCT VFR BLD AUTO: 41.4 % (ref 40.4–52.2)
HGB BLD-MCNC: 13.2 G/DL (ref 13.7–17.6)
TESTOST SERPL-MCNC: 211 NG/DL (ref 264–916)

## 2018-08-07 RX ORDER — TESTOSTERONE 40.5 MG/2.5G
GEL TOPICAL
Qty: 2.5 G | Refills: 1 | Status: SHIPPED | OUTPATIENT
Start: 2018-08-07 | End: 2018-12-10 | Stop reason: SDUPTHER

## 2018-08-11 ENCOUNTER — RESULTS ENCOUNTER (OUTPATIENT)
Dept: ENDOCRINOLOGY | Age: 68
End: 2018-08-11

## 2018-08-11 DIAGNOSIS — E78.2 MIXED HYPERLIPIDEMIA: ICD-10-CM

## 2018-08-11 DIAGNOSIS — E55.9 VITAMIN D DEFICIENCY: ICD-10-CM

## 2018-08-11 DIAGNOSIS — E29.1 HYPOGONADISM IN MALE: ICD-10-CM

## 2018-08-11 DIAGNOSIS — R39.15 URGENCY OF URINATION: ICD-10-CM

## 2018-08-11 DIAGNOSIS — E11.9 TYPE 2 DIABETES MELLITUS WITHOUT COMPLICATION, WITHOUT LONG-TERM CURRENT USE OF INSULIN (HCC): ICD-10-CM

## 2018-08-17 ENCOUNTER — TELEPHONE (OUTPATIENT)
Dept: ENDOCRINOLOGY | Age: 68
End: 2018-08-17

## 2018-08-17 NOTE — TELEPHONE ENCOUNTER
SPOKE JENELLE WITH [facility] FOR QTY AND REFILL FOR THE ANDROGEL PUMP      ----- Message from Phoebe Guzman sent at 8/17/2018  1:22 PM EDT -----  Contact: [facility] - [address] - [phone] PH - [phone] (Phone)  [phone] (Fax)    CALLED IN REGARDS TO NEEDED CLARIFICATION ON PATIENT Testosterone 40.5 MG/2.5GM (1.62%) gel. THEY ARE NEEDING THE QUANTITY AND WHETHER OR NOT ITS A PUMP.    PLEASE CALL [phone] REF#[id number]

## 2018-08-27 ENCOUNTER — TELEPHONE (OUTPATIENT)
Dept: ENDOCRINOLOGY | Age: 68
End: 2018-08-27

## 2018-08-27 NOTE — TELEPHONE ENCOUNTER
PATIENT NEVER RETURN PHONE CALL       ----- Message from Phoebe Jaimes sent at 8/22/2018  9:30 AM EDT -----  Contact: PATIENT   PATIENT STATED HE GETS LOOPY, HE HAS DOUBLE VISION AND THIS STARTED EVER SINCE HE STARTED TAKING lisinopril-hydrochlorothiazide, [phone]    PATIENT IS GOING TO STOP TAKING MEDICATION TO MAKE SURE THIS IS WHAT IS CAUSING HIS PROBLEMS.  IF A NEW SCRIPT NEEDS TO BE SENT IN PLEASE SENT TO     [facility] - [address] - [phone]  - [phone] (Phone)  [phone] (Fax)

## 2018-09-04 RX ORDER — INSULIN DETEMIR 100 [IU]/ML
INJECTION, SOLUTION SUBCUTANEOUS
Qty: 15 PEN | Refills: 3 | Status: SHIPPED | OUTPATIENT
Start: 2018-09-04 | End: 2019-03-04

## 2018-09-04 RX ORDER — SITAGLIPTIN 100 MG/1
TABLET, FILM COATED ORAL
Qty: 90 TABLET | Refills: 3 | Status: SHIPPED | OUTPATIENT
Start: 2018-09-04 | End: 2019-08-30 | Stop reason: SDUPTHER

## 2018-11-01 ENCOUNTER — OFFICE VISIT (OUTPATIENT)
Dept: INTERNAL MEDICINE | Facility: CLINIC | Age: 68
End: 2018-11-01

## 2018-11-01 VITALS
OXYGEN SATURATION: 95 % | DIASTOLIC BLOOD PRESSURE: 72 MMHG | SYSTOLIC BLOOD PRESSURE: 136 MMHG | HEART RATE: 80 BPM | WEIGHT: 208 LBS | HEIGHT: 70 IN | TEMPERATURE: 98.2 F | BODY MASS INDEX: 29.78 KG/M2

## 2018-11-01 DIAGNOSIS — G47.33 OSA (OBSTRUCTIVE SLEEP APNEA): ICD-10-CM

## 2018-11-01 DIAGNOSIS — H53.2 TRANSIENT DIPLOPIA: ICD-10-CM

## 2018-11-01 DIAGNOSIS — E78.2 MIXED HYPERLIPIDEMIA: ICD-10-CM

## 2018-11-01 DIAGNOSIS — E11.42 TYPE 2 DIABETES MELLITUS WITH DIABETIC POLYNEUROPATHY, WITH LONG-TERM CURRENT USE OF INSULIN (HCC): Primary | ICD-10-CM

## 2018-11-01 DIAGNOSIS — G62.9 PERIPHERAL NERVE DISEASE: ICD-10-CM

## 2018-11-01 DIAGNOSIS — Z79.4 TYPE 2 DIABETES MELLITUS WITH DIABETIC POLYNEUROPATHY, WITH LONG-TERM CURRENT USE OF INSULIN (HCC): Primary | ICD-10-CM

## 2018-11-01 PROCEDURE — 99213 OFFICE O/P EST LOW 20 MIN: CPT | Performed by: FAMILY MEDICINE

## 2018-11-01 RX ORDER — ATORVASTATIN CALCIUM 20 MG/1
TABLET, FILM COATED ORAL
COMMUNITY
Start: 2018-10-02 | End: 2018-12-10 | Stop reason: SDUPTHER

## 2018-11-01 RX ORDER — TESTOSTERONE 16.2 MG/G
GEL TRANSDERMAL
COMMUNITY
Start: 2018-08-21 | End: 2018-11-01 | Stop reason: DRUGHIGH

## 2018-11-01 NOTE — PROGRESS NOTES
Subjective   Andrea Fam is a 68 y.o. male.     Chief Complaint   Patient presents with   • Hyperlipidemia   • Diabetes         History of Present Illness     Delightful gentleman with a history of type 2 diabetes insulin controlled seeing endocrinology.  He was tried on lisinopril HCTZ but this wound up giving him diplopia needed to stop it.  He has had no diplopia since taking lisinopril or before.  This is a very unusual symptom but he does follow-up with ophthalmology.  Otherwise history is reviewed and is pretty active and has had some orthopedic surgeries.  He does have evidence of diabetic neuropathy of the legs from the knees down and little bit of numbness in the fingers.  He is already up-to-date on flu shot.    The following portions of the patient's history were reviewed and updated as appropriate: allergies, current medications, past social history and problem list.    Review of Systems   Constitutional: Negative.    HENT: Negative.    Eyes: Positive for visual disturbance.   Respiratory: Negative.    Cardiovascular: Negative.    Gastrointestinal: Negative.    Endocrine: Negative.    Genitourinary: Negative.    Musculoskeletal: Negative.    Skin: Negative.    Allergic/Immunologic: Negative.    Neurological: Negative.    Hematological: Negative.    Psychiatric/Behavioral: Negative.        Objective   Vitals:    11/01/18 1148   BP: 136/72   Pulse: 80   Temp: 98.2 °F (36.8 °C)   SpO2: 95%     Physical Exam   Constitutional: He is oriented to person, place, and time. He appears well-developed and well-nourished.   HENT:   Head: Normocephalic and atraumatic.   Right Ear: Tympanic membrane and external ear normal.   Left Ear: Tympanic membrane and external ear normal.   Nose: Nose normal.   Mouth/Throat: Oropharynx is clear and moist.   Eyes: Pupils are equal, round, and reactive to light. Conjunctivae and EOM are normal.   Neck: Normal range of motion. Neck supple. No JVD present. No thyromegaly present.    Cardiovascular: Normal rate, regular rhythm, normal heart sounds and intact distal pulses.    Pulmonary/Chest: Effort normal and breath sounds normal.   Abdominal: Soft. Bowel sounds are normal.   Musculoskeletal: Normal range of motion.   Lymphadenopathy:     He has no cervical adenopathy.   Neurological: He is alert and oriented to person, place, and time. No cranial nerve deficit. Coordination normal.   Skin: Skin is warm and dry. No rash noted.   Psychiatric: He has a normal mood and affect. His behavior is normal. Judgment and thought content normal.   Vitals reviewed.      Assessment/Plan   Problem List Items Addressed This Visit        Cardiovascular and Mediastinum    Mixed hyperlipidemia    Relevant Medications    atorvastatin (LIPITOR) 20 MG tablet       Respiratory    LISANDRA (obstructive sleep apnea) on auto CPAP       Endocrine    Diabetes mellitus (CMS/HCC) - Primary       Nervous and Auditory    Peripheral nerve disease      Other Visit Diagnoses     Transient diplopia          No changes at this time stay off lisinopril follow-up with endocrinology recheck in a year for physical.

## 2018-11-23 RX ORDER — LIRAGLUTIDE 6 MG/ML
INJECTION SUBCUTANEOUS
Qty: 27 ML | Refills: 3 | Status: SHIPPED | OUTPATIENT
Start: 2018-11-23 | End: 2019-07-02

## 2018-12-10 RX ORDER — TESTOSTERONE 40.5 MG/2.5G
GEL TOPICAL
Qty: 2.5 G | Refills: 1 | Status: SHIPPED | OUTPATIENT
Start: 2018-12-10 | End: 2019-03-04

## 2018-12-10 RX ORDER — ATORVASTATIN CALCIUM 20 MG/1
20 TABLET, FILM COATED ORAL DAILY
Qty: 90 TABLET | Refills: 3 | Status: SHIPPED | OUTPATIENT
Start: 2018-12-10 | End: 2019-12-09 | Stop reason: SDUPTHER

## 2018-12-10 RX ORDER — OXCARBAZEPINE 600 MG/1
TABLET, FILM COATED ORAL
Qty: 180 TABLET | Refills: 3 | Status: SHIPPED | OUTPATIENT
Start: 2018-12-10 | End: 2019-10-01 | Stop reason: SDUPTHER

## 2018-12-10 NOTE — TELEPHONE ENCOUNTER
----- Message from Tasia Reynolds sent at 12/10/2018 10:55 AM EST -----  Contact: [phone]  PATIENT NEEDS SCRIPTS SENT TO [facility]    WANTS TO TALK TO YOU ABOUT IT BEFORE YOU SEND IT IN      HE SAID IT WAS SEVERAL PRESCRIPTIONS

## 2019-02-06 ENCOUNTER — RESULTS ENCOUNTER (OUTPATIENT)
Dept: ENDOCRINOLOGY | Age: 69
End: 2019-02-06

## 2019-02-06 DIAGNOSIS — E78.2 MIXED HYPERLIPIDEMIA: ICD-10-CM

## 2019-02-06 DIAGNOSIS — E55.9 VITAMIN D DEFICIENCY: ICD-10-CM

## 2019-02-06 DIAGNOSIS — E11.9 TYPE 2 DIABETES MELLITUS WITHOUT COMPLICATION, WITHOUT LONG-TERM CURRENT USE OF INSULIN (HCC): ICD-10-CM

## 2019-02-06 DIAGNOSIS — E29.1 HYPOGONADISM IN MALE: ICD-10-CM

## 2019-02-06 DIAGNOSIS — R39.11 HESITANCY OF MICTURITION: ICD-10-CM

## 2019-02-28 LAB
25(OH)D3+25(OH)D2 SERPL-MCNC: 69.1 NG/ML (ref 30–100)
BUN SERPL-MCNC: 13 MG/DL (ref 8–23)
BUN/CREAT SERPL: 14 (ref 7–25)
CALCIUM SERPL-MCNC: 10.7 MG/DL (ref 8.6–10.5)
CHLORIDE SERPL-SCNC: 95 MMOL/L (ref 98–107)
CHOLEST SERPL-MCNC: 117 MG/DL (ref 0–200)
CO2 SERPL-SCNC: 28.8 MMOL/L (ref 22–29)
CREAT SERPL-MCNC: 0.93 MG/DL (ref 0.76–1.27)
FOLATE SERPL-MCNC: >20 NG/ML (ref 4.78–24.2)
GLUCOSE SERPL-MCNC: 148 MG/DL (ref 65–99)
HBA1C MFR BLD: 7.5 % (ref 4.8–5.6)
HCT VFR BLD AUTO: 42.7 % (ref 37.5–51)
HDLC SERPL-MCNC: 33 MG/DL (ref 40–60)
HGB BLD-MCNC: 13.9 G/DL (ref 13–17.7)
INTERPRETATION: NORMAL
LDLC SERPL CALC-MCNC: 57 MG/DL (ref 0–100)
Lab: NORMAL
POTASSIUM SERPL-SCNC: 5 MMOL/L (ref 3.5–5.2)
PSA SERPL-MCNC: 1.03 NG/ML (ref 0–4)
SODIUM SERPL-SCNC: 133 MMOL/L (ref 136–145)
T4 FREE SERPL-MCNC: 0.92 NG/DL (ref 0.93–1.7)
TESTOST SERPL-MCNC: 230 NG/DL (ref 264–916)
TRIGL SERPL-MCNC: 136 MG/DL (ref 0–150)
TSH SERPL DL<=0.005 MIU/L-ACNC: 1.8 MIU/ML (ref 0.27–4.2)
VIT B12 SERPL-MCNC: 1079 PG/ML (ref 211–946)
VLDLC SERPL CALC-MCNC: 27.2 MG/DL (ref 5–40)

## 2019-03-04 ENCOUNTER — OFFICE VISIT (OUTPATIENT)
Dept: ENDOCRINOLOGY | Age: 69
End: 2019-03-04

## 2019-03-04 VITALS
DIASTOLIC BLOOD PRESSURE: 72 MMHG | SYSTOLIC BLOOD PRESSURE: 130 MMHG | HEART RATE: 82 BPM | WEIGHT: 209 LBS | BODY MASS INDEX: 29.92 KG/M2 | OXYGEN SATURATION: 98 % | HEIGHT: 70 IN

## 2019-03-04 DIAGNOSIS — R79.89 LOW TESTOSTERONE IN MALE: ICD-10-CM

## 2019-03-04 DIAGNOSIS — E78.2 MIXED HYPERLIPIDEMIA: ICD-10-CM

## 2019-03-04 DIAGNOSIS — E11.9 TYPE 2 DIABETES MELLITUS WITHOUT COMPLICATION, WITHOUT LONG-TERM CURRENT USE OF INSULIN (HCC): Primary | ICD-10-CM

## 2019-03-04 PROCEDURE — 99214 OFFICE O/P EST MOD 30 MIN: CPT | Performed by: INTERNAL MEDICINE

## 2019-03-04 RX ORDER — TESTOSTERONE 40.5 MG/2.5G
GEL TOPICAL
Qty: 2.5 G | Refills: 0
Start: 2019-03-04 | End: 2019-07-02

## 2019-03-04 RX ORDER — INSULIN GLARGINE 100 [IU]/ML
22 INJECTION, SOLUTION SUBCUTANEOUS NIGHTLY
Qty: 5 PEN | Refills: 3 | Status: SHIPPED | OUTPATIENT
Start: 2019-03-04 | End: 2019-07-02

## 2019-03-04 NOTE — PROGRESS NOTES
69 y.o.    Patient Care Team:  Ulisses Campoverde MD as PCP - General (Family Medicine)  Asya Henao MD as Consulting Physician (Endocrinology)  John Mcguire MD as Consulting Physician (Pulmonary Disease)  Tamia Carrillo MD as Consulting Physician (Ophthalmology)  Jamal Prince MD as Consulting Physician (Orthopedic Surgery)    Chief Complaint:    6 MONTH FOLLOW UP/TYPE 2 DIABETES MELLITUS   Subjective     HPI    Andrea Fam 69 y.o. presents with Type 2 dm as a follow up patient.       Type 2 dm - Diagnosed about 20 years ago. Was started on oral agents initially. Insulin was started about 10 years ago.   Today in clinic pt reports that he is on levemir 18 units at bed time, victoza 1.8 mg subq daily, Januvia 100 mg po daily, Metformin 1000 mg po bid.   Tolerating medications with no side effects.  He has not been checking his blood sugars.  Has been occasionally checking them maybe once or twice a month.  Does not know about high or low blood sugar values, as he has not been checking.  No increased urination or thirst.  Last eye examination was within the last 1 year, no history of diabetic retinopathy.  Status post cataract surgery.  Does have diabetic neuropathy which has been stable, currently on oxcarbazepine.  No history of CAD, CK D, CVA per patient.  He is physically active and reports that his weight is stable.  He tries to follow diabetic diet for most part.       Low testosterone  Has been on this replacement for more than 10 years now.  Initially started by primary care physician.  Dr. Myers his current primary care physician requested me to continue this medication.  Currently androgel 1.62% - 2 application on skin daily. Doesn't think he had a recent prostate exam.   No complaints of increased urination, blood in his urine.  No history of blood clots.  Explained to the patient multiple times during his prior office visits that testosterone replacement would have significant  cardiac risk at his age.     Hyperlipidemia  On Lipitor 10 mg oral daily, fish oil 2 times daily.      Sleep apnea  Compliant with her CPAP machine.     Reviewed primary care physician's/consulting physician documentation and lab results :     Interval History      The following portions of the patient's history were reviewed and updated as appropriate: allergies, current medications, past family history, past medical history, past social history, past surgical history and problem list.    Past Medical History:   Diagnosis Date   • Arthritis    • Cataract     HAVING SURGERY IN JUNE   • High cholesterol    • Obstructive sleep apnea on CPAP    • Peripheral neuropathy     FINGERS AND LOWER LEGS   • Shoulder pain, right    • Testosterone deficiency    • Type 2 diabetes mellitus (CMS/HCC)      Family History   Problem Relation Age of Onset   • Alzheimer's disease Mother    • Lymphoma Paternal Grandmother    • Cerebral aneurysm Paternal Grandfather    • Colon cancer Neg Hx      Social History     Socioeconomic History   • Marital status:      Spouse name: Not on file   • Number of children: Not on file   • Years of education: Not on file   • Highest education level: Not on file   Social Needs   • Financial resource strain: Not on file   • Food insecurity - worry: Not on file   • Food insecurity - inability: Not on file   • Transportation needs - medical: Not on file   • Transportation needs - non-medical: Not on file   Occupational History   • Not on file   Tobacco Use   • Smoking status: Former Smoker     Packs/day: 1.50     Years: 30.00     Pack years: 45.00     Types: Cigarettes   • Smokeless tobacco: Never Used   • Tobacco comment: QUIT AGE 48   Substance and Sexual Activity   • Alcohol use: Yes     Comment: 2  drinks per  month   • Drug use: No   • Sexual activity: Defer   Other Topics Concern   • Not on file   Social History Narrative   • Not on file     Allergies   Allergen Reactions   • Lisinopril Other  (See Comments)     Diplopia         Current Outpatient Medications:   •  atorvastatin (LIPITOR) 20 MG tablet, Take 1 tablet by mouth Daily., Disp: 90 tablet, Rfl: 3  •  Cholecalciferol (VITAMIN D) 2000 UNITS tablet, Take 2,000 Units by mouth 2 (Two) Times a Day., Disp: , Rfl:   •  FOLIC ACID PO, Take 400 mcg by mouth 2 (Two) Times a Day. WILL HOLD PRIOR TO SURGERY, Disp: , Rfl:   •  JANUVIA 100 MG tablet, TAKE 1 TABLET DAILY, Disp: 90 tablet, Rfl: 3  •  metFORMIN (GLUCOPHAGE) 500 MG tablet, TAKE 2 TABLETS TWICE A DAY WITH MEALS, Disp: 360 tablet, Rfl: 3  •  Multiple Vitamin (MULTI-VITAMIN DAILY PO), Take  by mouth., Disp: , Rfl:   •  NOVOFINE PLUS 32G X 4 MM misc, USE WITH INSULIN, Disp: 200 each, Rfl: 3  •  Omega-3 Fatty Acids (FISH OIL) 1000 MG capsule capsule, Take 1,200 mg by mouth Daily With Breakfast. PT TO HOLD MED PRIOR TO OR, Disp: , Rfl:   •  OXcarbazepine (TRILEPTAL) 600 MG tablet, TAKE 1 TABLET IN A.M AND 1.5 IN P.M, Disp: 180 tablet, Rfl: 3  •  Testosterone 40.5 MG/2.5GM (1.62%) gel, 2 applications once daily., Disp: 2.5 g, Rfl: 0  •  VICTOZA 18 MG/3ML solution pen-injector injection, INJECT 1.8 MG UNDER THE SKIN EVERY MORNING, Disp: 27 mL, Rfl: 3  •  vitamin B-12 (CYANOCOBALAMIN) 1000 MCG tablet, Take 1,000 mcg by mouth Every Other Day. TAKES ON ODD NUMBER DAYS WILL HOLD PRIOR TO SURGERY, Disp: , Rfl:   •  vitamin C (ASCORBIC ACID) 500 MG tablet, Take 1,000 mg by mouth 2 (Two) Times a Day. PT TO HOLD PRIOR TO SURGERY, Disp: , Rfl:         Review of Systems   Constitutional: Positive for fatigue. Negative for appetite change and fever.   Eyes: Negative for visual disturbance.   Respiratory: Negative for shortness of breath.    Cardiovascular: Negative for palpitations and leg swelling.   Gastrointestinal: Negative for abdominal pain and vomiting.   Endocrine: Negative for polydipsia and polyuria.   Musculoskeletal: Negative for joint swelling and neck pain.   Skin: Negative for rash.   Neurological:  "Negative for weakness and numbness.   Psychiatric/Behavioral: Negative for behavioral problems.       Objective       Vitals:    03/04/19 0945   BP: 130/72   Pulse: 82   SpO2: 98%   Weight: 94.8 kg (209 lb)   Height: 177.8 cm (70\")     Body mass index is 29.99 kg/m².      Physical Exam   Constitutional: He is oriented to person, place, and time. He appears well-nourished.   obese   HENT:   Head: Normocephalic and atraumatic.   Wide neck   Eyes: Conjunctivae and EOM are normal.   Neck: Normal range of motion. Neck supple. Carotid bruit is not present. No thyromegaly present.   Acanthosis nigricans   Cardiovascular: Normal rate and normal heart sounds.   Pulmonary/Chest: Effort normal and breath sounds normal. No stridor. No respiratory distress.   Abdominal: Soft. Bowel sounds are normal. He exhibits no distension. There is no tenderness.   Central obesity   Musculoskeletal: He exhibits no edema or tenderness.   Neurological: He is alert and oriented to person, place, and time.   Skin: Skin is warm and dry.   Psychiatric: He has a normal mood and affect. His behavior is normal.   Vitals reviewed.    Results Review:     I reviewed the patient's new clinical results and mentioned them above in HPI and in plan as well.    Medical records reviewed  Summary:done      Results Encounter on 02/06/2019   Component Date Value Ref Range Status   • TSH 02/27/2019 1.800  0.270 - 4.200 mIU/mL Final   • Free T4 02/27/2019 0.92* 0.93 - 1.70 ng/dL Final   • Hemoglobin A1C 02/27/2019 7.50* 4.80 - 5.60 % Final    Comment: Hemoglobin A1C Ranges:  Increased Risk for Diabetes  5.7% to 6.4%  Diabetes                     >= 6.5%  Diabetic Goal                < 7.0%     • Glucose 02/27/2019 148* 65 - 99 mg/dL Final   • BUN 02/27/2019 13  8 - 23 mg/dL Final   • Creatinine 02/27/2019 0.93  0.76 - 1.27 mg/dL Final   • eGFR Non African Am 02/27/2019 80  >60 mL/min/1.73 Final    Comment: The MDRD GFR formula is only valid for adults with " stable  renal function between ages 18 and 70.     • eGFR  Am 02/27/2019 97  >60 mL/min/1.73 Final   • BUN/Creatinine Ratio 02/27/2019 14.0  7.0 - 25.0 Final   • Sodium 02/27/2019 133* 136 - 145 mmol/L Final   • Potassium 02/27/2019 5.0  3.5 - 5.2 mmol/L Final   • Chloride 02/27/2019 95* 98 - 107 mmol/L Final   • Total CO2 02/27/2019 28.8  22.0 - 29.0 mmol/L Final   • Calcium 02/27/2019 10.7* 8.6 - 10.5 mg/dL Final   • Total Cholesterol 02/27/2019 117  0 - 200 mg/dL Final   • Triglycerides 02/27/2019 136  0 - 150 mg/dL Final   • HDL Cholesterol 02/27/2019 33* 40 - 60 mg/dL Final   • VLDL Cholesterol 02/27/2019 27.2  5 - 40 mg/dL Final   • LDL Cholesterol  02/27/2019 57  0 - 100 mg/dL Final   • Vitamin B-12 02/27/2019 1,079* 211 - 946 pg/mL Final   • Folate 02/27/2019 >20.00  4.78 - 24.20 ng/mL Final   • 25 Hydroxy, Vitamin D 02/27/2019 69.1  30.0 - 100.0 ng/ml Final    Comment: Reference Range for Total Vitamin D 25(OH)  Deficiency <20.0 ng/mL  Insufficiency 21-29 ng/mL  Sufficiency  ng/mL  Toxicity >100 ng/ml     • Testosterone, Total 02/27/2019 230* 264 - 916 ng/dL Final    Comment: Adult male reference interval is based on a population of  healthy nonobese males (BMI <30) between 19 and 39 years old.  Jimmy et.al. JCEM 2017,102;9452-7176. PMID: [id number].     • PSA 02/27/2019 1.030  0.000 - 4.000 ng/mL Final   • Hemoglobin 02/27/2019 13.9  13.0 - 17.7 g/dL Final   • Hematocrit 02/27/2019 42.7  37.5 - 51.0 % Final   • Interpretation 02/27/2019 Note   Final    Supplemental report is available.   • PDF Image 02/27/2019 Not applicable   Final     Lab Results   Component Value Date    HGBA1C 7.50 (H) 02/27/2019    HGBA1C 6.80 (H) 07/25/2018    HGBA1C 6.90 (H) 03/13/2018     Lab Results   Component Value Date    MICROALBUR 15.6 07/25/2018    CREATININE 0.93 02/27/2019     Imaging Results (most recent)     None                Assessment and Plan:      Type 2 diabetes mellitus-uncontrolled  HbA1c was  "since last visit.  Since Levemir is no longer covered by his insurance, will change it to basaglar  Increase the dosage of basaglar to 22 units at bedtime  Continue the other oral hypoglycemic agents  Continue Victoza.    Hyperlipidemia  Continue Lipitor.    Low testosterone level  Will refer the patient to urology for testosterone replacement  Patient wants testosterone replacement to be done by me he needs to stop the medication at least for 3 months to repeat all his blood workup and establish if he truly needs testosterone replacement.    Reviewed Lab results with the patient.             Asya Henao MD  03/04/19    EMR Dragon / transcription disclaimer:     \"Dictated utilizing Dragon dictation\".  "

## 2019-05-15 ENCOUNTER — OFFICE VISIT (OUTPATIENT)
Dept: SLEEP MEDICINE | Facility: HOSPITAL | Age: 69
End: 2019-05-15
Attending: INTERNAL MEDICINE

## 2019-05-15 VITALS — HEIGHT: 70 IN | BODY MASS INDEX: 29.92 KG/M2 | WEIGHT: 209 LBS

## 2019-05-15 DIAGNOSIS — G47.33 OSA (OBSTRUCTIVE SLEEP APNEA): Primary | ICD-10-CM

## 2019-05-15 PROCEDURE — 99213 OFFICE O/P EST LOW 20 MIN: CPT | Performed by: INTERNAL MEDICINE

## 2019-05-15 PROCEDURE — G0463 HOSPITAL OUTPT CLINIC VISIT: HCPCS

## 2019-05-15 NOTE — PROGRESS NOTES
"[facility] Up [facility] Note     Chief Complaint:  LISANDRA     Primary Care Physician: Suresh Rolon Jr., MD    Interval History:   I last saw the patient one year ago.  He is stable.  He goes to bed at 11:30 PM and awakens at 10 AM.  He will use the bathroom.  Garrett Sleepiness Scale is borderline abnormal at 8.    Review of Systems:    A complete review of systems was done and all were negative with the exception of postnasal drip    Social History:    Social History     Socioeconomic History   • Marital status:      Spouse name: Not on file   • Number of children: Not on file   • Years of education: Not on file   • Highest education level: Not on file   Tobacco Use   • Smoking status: Former Smoker     Packs/day: 1.50     Years: 30.00     Pack years: 45.00     Types: Cigarettes   • Smokeless tobacco: Never Used   • Tobacco comment: QUIT AGE 48   Substance and Sexual Activity   • Alcohol use: Yes     Comment: 2  drinks per  month   • Drug use: No   • Sexual activity: Defer       Allergies:  Lisinopril     Medication Review:  Reviewed.      Vital Signs:    Vitals:    05/15/19 0920   Weight: 94.8 kg (209 lb)   Height: 177.8 cm (70\")     Body mass index is 29.99 kg/m².    Physical Exam:    Constitutional:  Well developed 69 y.o. male that appears in no apparent distress.  Awake & oriented times 3.  Normal mood with normal recent and remote memory and normal judgement.  Eyes:  Conjunctivae normal.  Oropharynx:  moist mucous membranes without exudate and a large tongue and normal uvula that is long and tapered and mild-moderate narrowing of the posterior pharyngeal opening and class II-3 MP airway     Results Review:  DME is [facility] and he uses a nasal mask.  Downloads between 2/11 and 5/11/2019 compliance 92%.  Average usage is 8 hours and 58 minutes.  Average AHI is normal without leak.  Average AutoCPAP pressure is 8.3 and his auto CPAP 5-15.       Impression:   Obstructive sleep apnea adequately " treated with CPAP with good compliance and usage and no significant complaints of hypersomnolence.    Plan:  Good sleep hygiene measures should be maintained.  Weight loss would be beneficial in this patient who is nearly obese by BMI.  The patient is benefiting from the treatment being provided.     The patient will continue auto CPAP and a new prescription will be sent to his DME    The patient will call for any problems and will follow up in a year.      John Mcguire MD  Sleep Medicine  05/19/19  7:57 PM

## 2019-06-02 ENCOUNTER — RESULTS ENCOUNTER (OUTPATIENT)
Dept: ENDOCRINOLOGY | Age: 69
End: 2019-06-02

## 2019-06-02 DIAGNOSIS — E11.9 TYPE 2 DIABETES MELLITUS WITHOUT COMPLICATION, WITHOUT LONG-TERM CURRENT USE OF INSULIN (HCC): ICD-10-CM

## 2019-06-02 DIAGNOSIS — E78.2 MIXED HYPERLIPIDEMIA: ICD-10-CM

## 2019-06-11 RX ORDER — PEN NEEDLE, DIABETIC 32GX 5/32"
NEEDLE, DISPOSABLE MISCELLANEOUS
Qty: 180 EACH | Refills: 1 | Status: SHIPPED | OUTPATIENT
Start: 2019-06-11 | End: 2019-11-20 | Stop reason: SDUPTHER

## 2019-06-18 LAB
BUN SERPL-MCNC: 16 MG/DL (ref 8–23)
BUN/CREAT SERPL: 17 (ref 7–25)
CALCIUM SERPL-MCNC: 10.2 MG/DL (ref 8.6–10.5)
CHLORIDE SERPL-SCNC: 98 MMOL/L (ref 98–107)
CHOLEST SERPL-MCNC: 126 MG/DL (ref 0–200)
CO2 SERPL-SCNC: 28.6 MMOL/L (ref 22–29)
CREAT SERPL-MCNC: 0.94 MG/DL (ref 0.76–1.27)
FOLATE SERPL-MCNC: >20 NG/ML (ref 4.78–24.2)
GLUCOSE SERPL-MCNC: 141 MG/DL (ref 65–99)
HBA1C MFR BLD: 7.3 % (ref 4.8–5.6)
HDLC SERPL-MCNC: 32 MG/DL (ref 40–60)
INTERPRETATION: NORMAL
LDLC SERPL CALC-MCNC: 50 MG/DL (ref 0–100)
Lab: NORMAL
POTASSIUM SERPL-SCNC: 5 MMOL/L (ref 3.5–5.2)
SODIUM SERPL-SCNC: 140 MMOL/L (ref 136–145)
T4 FREE SERPL-MCNC: 0.87 NG/DL (ref 0.93–1.7)
TRIGL SERPL-MCNC: 220 MG/DL (ref 0–150)
TSH SERPL DL<=0.005 MIU/L-ACNC: 2.4 MIU/ML (ref 0.27–4.2)
VIT B12 SERPL-MCNC: >2000 PG/ML (ref 211–946)
VLDLC SERPL CALC-MCNC: 44 MG/DL

## 2019-06-25 RX ORDER — LANCETS 33 GAUGE
EACH MISCELLANEOUS
Qty: 100 EACH | Refills: 5 | Status: SHIPPED | OUTPATIENT
Start: 2019-06-25 | End: 2022-07-11

## 2019-06-25 RX ORDER — BLOOD-GLUCOSE METER
1 EACH MISCELLANEOUS AS NEEDED
Qty: 1 KIT | Refills: 0 | Status: SHIPPED | OUTPATIENT
Start: 2019-06-25 | End: 2022-07-11

## 2019-07-02 ENCOUNTER — OFFICE VISIT (OUTPATIENT)
Dept: ENDOCRINOLOGY | Age: 69
End: 2019-07-02

## 2019-07-02 VITALS
HEIGHT: 70 IN | HEART RATE: 68 BPM | OXYGEN SATURATION: 98 % | WEIGHT: 203 LBS | DIASTOLIC BLOOD PRESSURE: 74 MMHG | SYSTOLIC BLOOD PRESSURE: 122 MMHG | BODY MASS INDEX: 29.06 KG/M2

## 2019-07-02 DIAGNOSIS — E11.49 DM (DIABETES MELLITUS), TYPE 2 WITH NEUROLOGICAL COMPLICATIONS (HCC): ICD-10-CM

## 2019-07-02 DIAGNOSIS — E78.2 MIXED HYPERLIPIDEMIA: ICD-10-CM

## 2019-07-02 DIAGNOSIS — E11.9 TYPE 2 DIABETES MELLITUS WITHOUT COMPLICATION, WITHOUT LONG-TERM CURRENT USE OF INSULIN (HCC): Primary | ICD-10-CM

## 2019-07-02 PROCEDURE — 99214 OFFICE O/P EST MOD 30 MIN: CPT | Performed by: INTERNAL MEDICINE

## 2019-07-02 RX ORDER — PREGABALIN 50 MG/1
50 CAPSULE ORAL 2 TIMES DAILY
Qty: 180 CAPSULE | Refills: 3 | Status: SHIPPED | OUTPATIENT
Start: 2019-07-02 | End: 2019-11-05

## 2019-07-02 RX ORDER — LEVOTHYROXINE SODIUM 0.07 MG/1
37.5 TABLET ORAL DAILY
Qty: 45 TABLET | Refills: 3 | Status: SHIPPED | OUTPATIENT
Start: 2019-07-02 | End: 2019-11-05

## 2019-07-02 NOTE — PROGRESS NOTES
69 y.o.    Patient Care Team:  Suresh Rolon Jr., MD as PCP - General (Family Medicine)  Asya Henao MD as Consulting Physician (Endocrinology)  John Mcguire MD as Consulting Physician (Pulmonary Disease)  Tamia Carrillo MD as Consulting Physician (Ophthalmology)  Jamal Prince MD as Consulting Physician (Orthopedic Surgery)    Chief Complaint:    Follow up/ type 2 diabetes mellitus  Subjective     HPI    Andrea Fam 69 y.o. presents with Type 2 dm as a follow up patient.       Type 2 dm - Diagnosed about 20 years ago. Was started on oral agents initially. Insulin was started about 10 years ago.   Today in clinic patient reports that he is on Basaglar 20 units at bedtime, Victoza 1.8 mg subcutaneous daily, Januvia 100 mg oral daily, metformin thousand milligrams twice daily.  He checks his blood sugars once or twice a week.  Average blood sugars are around 180.  No significantly low blood sugars.  Highest blood sugar was 200 when he had the Nicole.  No increased urination or thirst.  Last eye examination was about a year ago, no history of diabetic retinopathy.  Status post cataract surgery.  Worsening symptoms of tingling and burning sensation, already on oxcarbazepine.  No history of CAD, CK D, CVA per patient.  He is physically active and reports that his weight is stable.  He tries to follow diabetic diet for most part.        After the discussion with the patient during his last visit, testosterone  was discontinued.     Hyperlipidemia  On Lipitor 20 mg oral daily, fish oil 2 times daily.      Sleep apnea  Compliant with her CPAP machine.      Reviewed primary care physician's/consulting physician documentation and lab results :     Interval History      The following portions of the patient's history were reviewed and updated as appropriate: allergies, current medications, past family history, past medical history, past social history, past surgical history and problem list.    Past  Medical History:   Diagnosis Date   • Arthritis    • Cataract     HAVING SURGERY IN JUNE   • High cholesterol    • Obstructive sleep apnea on CPAP    • Peripheral neuropathy     FINGERS AND LOWER LEGS   • Shoulder pain, right    • Testosterone deficiency    • Type 2 diabetes mellitus (CMS/HCC)      Family History   Problem Relation Age of Onset   • Alzheimer's disease Mother    • Lymphoma Paternal Grandmother    • Cerebral aneurysm Paternal Grandfather    • Colon cancer Neg Hx      Social History     Socioeconomic History   • Marital status:      Spouse name: Not on file   • Number of children: Not on file   • Years of education: Not on file   • Highest education level: Not on file   Tobacco Use   • Smoking status: Former Smoker     Packs/day: 1.50     Years: 30.00     Pack years: 45.00     Types: Cigarettes   • Smokeless tobacco: Never Used   • Tobacco comment: QUIT AGE 48   Substance and Sexual Activity   • Alcohol use: Yes     Comment: 2  drinks per  month   • Drug use: No   • Sexual activity: Defer     Allergies   Allergen Reactions   • Lisinopril Other (See Comments)     Diplopia         Current Outpatient Medications:   •  atorvastatin (LIPITOR) 20 MG tablet, Take 1 tablet by mouth Daily., Disp: 90 tablet, Rfl: 3  •  BD PEN NEEDLE MUNA U/F 32G X 4 MM misc, USE WITH INSULIN AS DIRECTED, Disp: 180 each, Rfl: 1  •  Blood Glucose Monitoring Suppl (ONETOUCH VERIO) w/Device kit, 1 Device As Needed (USE TO TO TEST BLOOD SUGAR 2 TIMES DAILY)., Disp: 1 kit, Rfl: 0  •  Cholecalciferol (VITAMIN D) 2000 UNITS tablet, Take 2,000 Units by mouth 2 (Two) Times a Day., Disp: , Rfl:   •  FOLIC ACID PO, Take 400 mcg by mouth 2 (Two) Times a Day. WILL HOLD PRIOR TO SURGERY, Disp: , Rfl:   •  glucose blood (ONETOUCH VERIO) test strip, USE TO TEST BLOOD SUGAR 2 TIMES DAILY, Disp: 100 each, Rfl: 5  •  glucose blood test strip, Use to test blood sugar 2 times daily, Disp: 100 each, Rfl: 9  •  Insulin Glargine (TOUJEO SOLOSTAR  SC), Inject  under the skin into the appropriate area as directed., Disp: , Rfl:   •  JANUVIA 100 MG tablet, TAKE 1 TABLET DAILY, Disp: 90 tablet, Rfl: 3  •  metFORMIN (GLUCOPHAGE) 500 MG tablet, TAKE 2 TABLETS TWICE A DAY WITH MEALS, Disp: 360 tablet, Rfl: 3  •  Multiple Vitamin (MULTI-VITAMIN DAILY PO), Take  by mouth., Disp: , Rfl:   •  Omega-3 Fatty Acids (FISH OIL) 1000 MG capsule capsule, Take 1,200 mg by mouth Daily With Breakfast. PT TO HOLD MED PRIOR TO OR, Disp: , Rfl:   •  ONETOUCH DELICA LANCETS 33G misc, USE TO TEST BLOOD SUGAR 2 TIMES DAILY, Disp: 100 each, Rfl: 5  •  OXcarbazepine (TRILEPTAL) 600 MG tablet, TAKE 1 TABLET IN A.M AND 1.5 IN P.M, Disp: 180 tablet, Rfl: 3  •  vitamin B-12 (CYANOCOBALAMIN) 1000 MCG tablet, Take 1,000 mcg by mouth Every Other Day. TAKES ON ODD NUMBER DAYS WILL HOLD PRIOR TO SURGERY, Disp: , Rfl:   •  vitamin C (ASCORBIC ACID) 500 MG tablet, Take 1,000 mg by mouth 2 (Two) Times a Day. PT TO HOLD PRIOR TO SURGERY, Disp: , Rfl:   •  levothyroxine (SYNTHROID, LEVOTHROID) 75 MCG tablet, Take 0.5 tablets by mouth Daily., Disp: 45 tablet, Rfl: 3  •  pregabalin (LYRICA) 50 MG capsule, Take 1 capsule by mouth 2 (Two) Times a Day., Disp: 180 capsule, Rfl: 3  •  Semaglutide (OZEMPIC) 1 MG/DOSE solution pen-injector, Inject 1 mg under the skin into the appropriate area as directed 1 (One) Time Per Week., Disp: 5 pen, Rfl: 3        Review of Systems   Constitutional: Positive for fatigue. Negative for appetite change and fever.   Eyes: Negative for visual disturbance.   Respiratory: Negative for shortness of breath.    Cardiovascular: Negative for palpitations and leg swelling.   Gastrointestinal: Negative for abdominal pain and vomiting.   Endocrine: Negative for polydipsia and polyuria.   Musculoskeletal: Negative for joint swelling and neck pain.   Skin: Negative for rash.   Neurological: Negative for weakness and numbness.   Psychiatric/Behavioral: Negative for behavioral problems.  "      Objective       Vitals:    07/02/19 1059   BP: 122/74   Pulse: 68   SpO2: 98%   Weight: 92.1 kg (203 lb)   Height: 177.8 cm (70\")     Body mass index is 29.13 kg/m².      Physical Exam   Constitutional: He is oriented to person, place, and time. He appears well-nourished.   obese   HENT:   Head: Normocephalic and atraumatic.   Wide neck   Eyes: Conjunctivae and EOM are normal.   Neck: Normal range of motion. Neck supple. Carotid bruit is not present. No thyromegaly present.   Acanthosis nigricans   Cardiovascular: Normal rate and normal heart sounds.   Pulmonary/Chest: Effort normal and breath sounds normal. No stridor. No respiratory distress.   Abdominal: Soft. Bowel sounds are normal. He exhibits no distension. There is no tenderness.   Central obesity   Musculoskeletal: He exhibits no edema or tenderness.   Neurological: He is alert and oriented to person, place, and time.   Skin: Skin is warm and dry.   Psychiatric: He has a normal mood and affect. His behavior is normal.   Vitals reviewed.    Results Review:     I reviewed the patient's new clinical results and mentioned them above in HPI and in plan as well.    Medical records reviewed  Summary:done      Results Encounter on 06/02/2019   Component Date Value Ref Range Status   • TSH 06/18/2019 2.400  0.270 - 4.200 mIU/mL Final   • Free T4 06/18/2019 0.87* 0.93 - 1.70 ng/dL Final   • Hemoglobin A1C 06/18/2019 7.30* 4.80 - 5.60 % Final    Comment: Hemoglobin A1C Ranges:  Increased Risk for Diabetes  5.7% to 6.4%  Diabetes                     >= 6.5%  Diabetic Goal                < 7.0%     • Glucose 06/18/2019 141* 65 - 99 mg/dL Final   • BUN 06/18/2019 16  8 - 23 mg/dL Final   • Creatinine 06/18/2019 0.94  0.76 - 1.27 mg/dL Final   • eGFR Non  Am 06/18/2019 79  >60 mL/min/1.73 Final    Comment: The MDRD GFR formula is only valid for adults with stable  renal function between ages 18 and 70.     • eGFR  Am 06/18/2019 96  >60 mL/min/1.73 " Final   • BUN/Creatinine Ratio 06/18/2019 17.0  7.0 - 25.0 Final   • Sodium 06/18/2019 140  136 - 145 mmol/L Final   • Potassium 06/18/2019 5.0  3.5 - 5.2 mmol/L Final   • Chloride 06/18/2019 98  98 - 107 mmol/L Final   • Total CO2 06/18/2019 28.6  22.0 - 29.0 mmol/L Final   • Calcium 06/18/2019 10.2  8.6 - 10.5 mg/dL Final   • Total Cholesterol 06/18/2019 126  0 - 200 mg/dL Final   • Triglycerides 06/18/2019 220* 0 - 150 mg/dL Final   • HDL Cholesterol 06/18/2019 32* 40 - 60 mg/dL Final   • VLDL Cholesterol 06/18/2019 44  mg/dL Final   • LDL Cholesterol  06/18/2019 50  0 - 100 mg/dL Final   • Vitamin B-12 06/18/2019 >2000* 211 - 946 pg/mL Final   • Folate 06/18/2019 >20.00  4.78 - 24.20 ng/mL Final   • Interpretation 06/18/2019 Note   Final    Supplemental report is available.   • PDF Image 06/18/2019 Not applicable   Final     Lab Results   Component Value Date    HGBA1C 7.30 (H) 06/18/2019    HGBA1C 7.50 (H) 02/27/2019    HGBA1C 6.80 (H) 07/25/2018     Lab Results   Component Value Date    MICROALBUR 15.6 07/25/2018    CREATININE 0.94 06/18/2019     Imaging Results (most recent)     None                Assessment and Plan:    Andrea was seen today for diabetes.    Diagnoses and all orders for this visit:    Type 2 diabetes mellitus without complication, without long-term current use of insulin (CMS/[facility])    Mixed hyperlipidemia    DM (diabetes mellitus), type 2 with neurological complications (CMS/[facility])    Other orders  -     Semaglutide (OZEMPIC) 1 MG/DOSE solution pen-injector; Inject 1 mg under the skin into the appropriate area as directed 1 (One) Time Per Week.  -     levothyroxine (SYNTHROID, LEVOTHROID) 75 MCG tablet; Take 0.5 tablets by mouth Daily.  -     pregabalin (LYRICA) 50 MG capsule; Take 1 capsule by mouth 2 (Two) Times a Day.      Type 2 diabetes mellitus-uncontrolled  Continue Basaglar 20 units at bedtime, based on the insurance is okay to change this to to 1-year-old later.  Stop Victoza and start  "Ozempic  Continue Januvia and metformin    Hypothyroidism  Symptoms are worse especially with fatigue  Start levothyroxine 37.5 mcg oral daily    Diabetic neuropathy  Worse  Continue oxcarbazepine, and add Lyrica 50 mg oral twice daily.    Reviewed Lab results with the patient.             Asya Henao MD  07/02/19    EMR Dragon / transcription disclaimer:     \"Dictated utilizing Dragon dictation\".  "

## 2019-07-02 NOTE — PATIENT INSTRUCTIONS
DM -   Continue the same dosage of Levemir 20 units at bedtime and eventually change to Toujeo after he runs out of Levemir.  Finish Victoza that he has at home and then start Ozempic.  Ozempic 0.25 mg once a week for 4 weeks and then 2.5 mg once a week for 4 weeks and then remain on 1 mg once a week until he comes to see me.  Continue Januvia and metformin      Fatigue  Start levothyroxine 37.5 mcg oral daily, take it on empty stomach first thing in the morning.    Diabetic neuropathy  Start Lyrica 50 mg twice daily.

## 2019-08-30 RX ORDER — SITAGLIPTIN 100 MG/1
TABLET, FILM COATED ORAL
Qty: 90 TABLET | Refills: 4 | Status: SHIPPED | OUTPATIENT
Start: 2019-08-30 | End: 2020-10-20

## 2019-10-01 RX ORDER — OXCARBAZEPINE 600 MG/1
TABLET, FILM COATED ORAL
Qty: 180 TABLET | Refills: 1 | Status: SHIPPED | OUTPATIENT
Start: 2019-10-01 | End: 2020-01-16

## 2019-10-09 DIAGNOSIS — E78.2 MIXED HYPERLIPIDEMIA: ICD-10-CM

## 2019-10-09 DIAGNOSIS — E55.9 VITAMIN D DEFICIENCY: ICD-10-CM

## 2019-10-09 DIAGNOSIS — R79.89 LOW TESTOSTERONE IN MALE: ICD-10-CM

## 2019-10-09 DIAGNOSIS — E11.9 TYPE 2 DIABETES MELLITUS WITHOUT COMPLICATION, WITHOUT LONG-TERM CURRENT USE OF INSULIN (HCC): Primary | ICD-10-CM

## 2019-10-22 ENCOUNTER — RESULTS ENCOUNTER (OUTPATIENT)
Dept: ENDOCRINOLOGY | Age: 69
End: 2019-10-22

## 2019-10-22 DIAGNOSIS — E55.9 VITAMIN D DEFICIENCY: ICD-10-CM

## 2019-10-22 DIAGNOSIS — E11.9 TYPE 2 DIABETES MELLITUS WITHOUT COMPLICATION, WITHOUT LONG-TERM CURRENT USE OF INSULIN (HCC): ICD-10-CM

## 2019-10-22 DIAGNOSIS — E78.2 MIXED HYPERLIPIDEMIA: ICD-10-CM

## 2019-10-22 DIAGNOSIS — R79.89 LOW TESTOSTERONE IN MALE: ICD-10-CM

## 2019-10-23 LAB
25(OH)D3+25(OH)D2 SERPL-MCNC: 63 NG/ML (ref 30–100)
BUN SERPL-MCNC: 11 MG/DL (ref 8–23)
BUN/CREAT SERPL: 11.3 (ref 7–25)
CALCIUM SERPL-MCNC: 10.1 MG/DL (ref 8.6–10.5)
CHLORIDE SERPL-SCNC: 101 MMOL/L (ref 98–107)
CHOLEST SERPL-MCNC: 129 MG/DL (ref 0–200)
CO2 SERPL-SCNC: 28.2 MMOL/L (ref 22–29)
CREAT SERPL-MCNC: 0.97 MG/DL (ref 0.76–1.27)
FOLATE SERPL-MCNC: >20 NG/ML (ref 4.78–24.2)
GLUCOSE SERPL-MCNC: 189 MG/DL (ref 65–99)
HBA1C MFR BLD: 7.9 % (ref 4.8–5.6)
HDLC SERPL-MCNC: 33 MG/DL (ref 40–60)
INTERPRETATION: NORMAL
LDLC SERPL CALC-MCNC: 56 MG/DL (ref 0–100)
Lab: NORMAL
POTASSIUM SERPL-SCNC: 4.6 MMOL/L (ref 3.5–5.2)
SODIUM SERPL-SCNC: 142 MMOL/L (ref 136–145)
T4 FREE SERPL-MCNC: 0.83 NG/DL (ref 0.93–1.7)
TESTOST SERPL-MCNC: 101 NG/DL (ref 264–916)
TRIGL SERPL-MCNC: 201 MG/DL (ref 0–150)
TSH SERPL DL<=0.005 MIU/L-ACNC: 2.52 UIU/ML (ref 0.27–4.2)
VIT B12 SERPL-MCNC: >2000 PG/ML (ref 211–946)
VLDLC SERPL CALC-MCNC: 40.2 MG/DL

## 2019-11-05 ENCOUNTER — OFFICE VISIT (OUTPATIENT)
Dept: ENDOCRINOLOGY | Age: 69
End: 2019-11-05

## 2019-11-05 VITALS
HEIGHT: 70 IN | HEART RATE: 73 BPM | BODY MASS INDEX: 29.63 KG/M2 | WEIGHT: 207 LBS | SYSTOLIC BLOOD PRESSURE: 110 MMHG | DIASTOLIC BLOOD PRESSURE: 76 MMHG | OXYGEN SATURATION: 96 %

## 2019-11-05 DIAGNOSIS — E78.2 MIXED HYPERLIPIDEMIA: ICD-10-CM

## 2019-11-05 DIAGNOSIS — E11.9 TYPE 2 DIABETES MELLITUS WITHOUT COMPLICATION, WITHOUT LONG-TERM CURRENT USE OF INSULIN (HCC): Primary | ICD-10-CM

## 2019-11-05 DIAGNOSIS — E03.9 ACQUIRED HYPOTHYROIDISM: ICD-10-CM

## 2019-11-05 PROCEDURE — 99214 OFFICE O/P EST MOD 30 MIN: CPT | Performed by: INTERNAL MEDICINE

## 2019-11-05 RX ORDER — BLOOD SUGAR DIAGNOSTIC
STRIP MISCELLANEOUS
Qty: 100 EACH | Refills: 5 | Status: SHIPPED | OUTPATIENT
Start: 2019-11-05 | End: 2022-07-11

## 2019-11-05 RX ORDER — LEVOTHYROXINE SODIUM 0.05 MG/1
50 TABLET ORAL DAILY
Qty: 90 TABLET | Refills: 3 | Status: SHIPPED | OUTPATIENT
Start: 2019-11-05 | End: 2019-11-05 | Stop reason: SDUPTHER

## 2019-11-05 RX ORDER — LEVOTHYROXINE SODIUM 0.05 MG/1
50 TABLET ORAL DAILY
Qty: 90 TABLET | Refills: 3 | Status: SHIPPED | OUTPATIENT
Start: 2019-11-05 | End: 2020-09-29

## 2019-11-05 NOTE — PROGRESS NOTES
69 y.o.    Patient Care Team:  Suresh Rolon Jr., MD as PCP - General (Family Medicine)  Asya Henao MD as Consulting Physician (Endocrinology)  John Mcguire MD as Consulting Physician (Pulmonary Disease)  Tamia Carrillo MD as Consulting Physician (Ophthalmology)  Jamal Prince MD as Consulting Physician (Orthopedic Surgery)    Chief Complaint:    FOLLOW UP/ TYPE 2 DIABETES MELLITUS  Subjective     HPI    Andrea Fam 69 y.o. presents with Type 2 dm as a follow up patient.       Type 2 dm - Diagnosed about 20 years ago. Was started on oral agents initially. Insulin was started about 10 years ago.   Today in clinic patient reports that he is on Toujeo 22 units in the morning, Victoza 1.8 mg subcutaneous daily, Januvia 100 mg oral daily, metformin thousand milligrams twice daily.  Patient was supposed to start Ozempic since his last visit in June 2019 itself but since he had 2 to 3 months worth of Victoza at home he still continued on Victoza instead of starting on the Ozempic.  Checks his blood sugars 1-2 times a day.  Average blood sugars are around 180-200.  No significant low blood sugars.  No increased urination or thirst.  Last eye examination was about a year ago, no history of diabetic retinopathy.  Status post cataract surgery.  Worsening symptoms of tingling and burning sensation, already on oxcarbazepine.  No history of CAD, CK D, CVA per patient.  He is physically active and reports that his weight is stable.  He tries to follow diabetic diet for most part.        Hyperlipidemia  On Lipitor 20 mg oral daily, fish oil 2 times daily.      Sleep apnea  Compliant with her CPAP machine.    Hypothyroidism  On levothyroxine 37.5 mcg oral daily.    Reviewed primary care physician's/consulting physician documentation and lab results :     Interval History      The following portions of the patient's history were reviewed and updated as appropriate: allergies, current medications, past family  history, past medical history, past social history, past surgical history and problem list.    Past Medical History:   Diagnosis Date   • Arthritis    • Cataract     HAVING SURGERY IN JUNE   • High cholesterol    • Obstructive sleep apnea on CPAP    • Peripheral neuropathy     FINGERS AND LOWER LEGS   • Shoulder pain, right    • Testosterone deficiency    • Type 2 diabetes mellitus (CMS/HCC)      Family History   Problem Relation Age of Onset   • Alzheimer's disease Mother    • Lymphoma Paternal Grandmother    • Cerebral aneurysm Paternal Grandfather    • Colon cancer Neg Hx      Social History     Socioeconomic History   • Marital status:      Spouse name: Not on file   • Number of children: Not on file   • Years of education: Not on file   • Highest education level: Not on file   Tobacco Use   • Smoking status: Former Smoker     Packs/day: 1.50     Years: 30.00     Pack years: 45.00     Types: Cigarettes   • Smokeless tobacco: Never Used   • Tobacco comment: QUIT AGE 48   Substance and Sexual Activity   • Alcohol use: Yes     Comment: 2  drinks per  month   • Drug use: No   • Sexual activity: Defer     Allergies   Allergen Reactions   • Lisinopril Other (See Comments)     Diplopia         Current Outpatient Medications:   •  atorvastatin (LIPITOR) 20 MG tablet, Take 1 tablet by mouth Daily., Disp: 90 tablet, Rfl: 3  •  BD PEN NEEDLE MUNA U/F 32G X 4 MM misc, USE WITH INSULIN AS DIRECTED, Disp: 180 each, Rfl: 1  •  Blood Glucose Monitoring Suppl (ONETOUCH VERIO) w/Device kit, 1 Device As Needed (USE TO TO TEST BLOOD SUGAR 2 TIMES DAILY)., Disp: 1 kit, Rfl: 0  •  Cholecalciferol (VITAMIN D) 2000 UNITS tablet, Take 2,000 Units by mouth 2 (Two) Times a Day., Disp: , Rfl:   •  FOLIC ACID PO, Take 400 mcg by mouth 2 (Two) Times a Day. WILL HOLD PRIOR TO SURGERY, Disp: , Rfl:   •  Insulin Glargine (TOUJEO SOLOSTAR SC), Inject  under the skin into the appropriate area as directed., Disp: , Rfl:   •  JANUVIA 100 MG  tablet, TAKE 1 TABLET DAILY, Disp: 90 tablet, Rfl: 4  •  levothyroxine (SYNTHROID, LEVOTHROID) 50 MCG tablet, Take 1 tablet by mouth Daily., Disp: 90 tablet, Rfl: 3  •  metFORMIN (GLUCOPHAGE) 500 MG tablet, TAKE 2 TABLETS TWICE A DAY WITH MEALS, Disp: 360 tablet, Rfl: 3  •  Multiple Vitamin (MULTI-VITAMIN DAILY PO), Take  by mouth., Disp: , Rfl:   •  Omega-3 Fatty Acids (FISH OIL) 1000 MG capsule capsule, Take 1,200 mg by mouth Daily With Breakfast. PT TO HOLD MED PRIOR TO OR, Disp: , Rfl:   •  ONETOUCH DELICA LANCETS 33G misc, USE TO TEST BLOOD SUGAR 2 TIMES DAILY, Disp: 100 each, Rfl: 5  •  OXcarbazepine (TRILEPTAL) 600 MG tablet, TAKE 1 TABLET IN THE MORNING AND ONE AND ONE-HALF TABLETS IN THE EVENING, Disp: 180 tablet, Rfl: 1  •  Semaglutide (OZEMPIC) 1 MG/DOSE solution pen-injector, Inject 1 mg under the skin into the appropriate area as directed 1 (One) Time Per Week., Disp: 5 pen, Rfl: 3  •  vitamin B-12 (CYANOCOBALAMIN) 1000 MCG tablet, Take 1,000 mcg by mouth Every Other Day. TAKES ON ODD NUMBER DAYS WILL HOLD PRIOR TO SURGERY, Disp: , Rfl:   •  vitamin C (ASCORBIC ACID) 500 MG tablet, Take 1,000 mg by mouth 2 (Two) Times a Day. PT TO HOLD PRIOR TO SURGERY, Disp: , Rfl:   •  glucose blood test strip, Use to test blood sugar 2 times daily, Disp: 100 each, Rfl: 9  •  ONETOUCH VERIO test strip, USE TO TEST BLOOD SUGAR 2 TIMES DAILY, Disp: 100 each, Rfl: 5        Review of Systems   Constitutional: Negative for appetite change, fatigue and fever.   Eyes: Negative for visual disturbance.   Respiratory: Negative for shortness of breath.    Cardiovascular: Negative for palpitations and leg swelling.   Gastrointestinal: Negative for abdominal pain and vomiting.   Endocrine: Negative for polydipsia and polyuria.   Musculoskeletal: Negative for joint swelling and neck pain.   Skin: Negative for rash.   Neurological: Negative for weakness and numbness.   Psychiatric/Behavioral: Negative for behavioral problems.  "      Objective       Vitals:    11/05/19 0949   BP: 110/76   Pulse: 73   SpO2: 96%   Weight: 93.9 kg (207 lb)   Height: 177.8 cm (70\")     Body mass index is 29.7 kg/m².      Physical Exam   General exam-no distress, appears stated age.  Eyes-PERRL, anicteric sclera  ENT-external ears/nose normal.  Neck-no adenopathy, midline trachea.  Lungs-normal chest exam on inspection, normal breath sounds.    Cardiovascular -normal S1-S2, positive murmur.  ABD-nontender, nondistended, bowel sounds heard.  Extremities-no edema, cyanosis.  Hammertoes noted.          Results Review:     I reviewed the patient's new clinical results and mentioned them above in HPI and in plan as well.    Medical records reviewed  Summary:Done      Results Encounter on 10/22/2019   Component Date Value Ref Range Status   • 25 Hydroxy, Vitamin D 10/22/2019 63.0  30.0 - 100.0 ng/ml Final    Comment: Reference Range for Total Vitamin D 25(OH)  Deficiency <20.0 ng/mL  Insufficiency 21-29 ng/mL  Sufficiency  ng/mL  Toxicity >100 ng/ml     • Vitamin B-12 10/22/2019 >2000* 211 - 946 pg/mL Final   • Folate 10/22/2019 >20.00  4.78 - 24.20 ng/mL Final   • Free T4 10/22/2019 0.83* 0.93 - 1.70 ng/dL Final   • TSH 10/22/2019 2.520  0.270 - 4.200 uIU/mL Final   • Testosterone, Total 10/22/2019 101* 264 - 916 ng/dL Final    Comment: Adult male reference interval is based on a population of  healthy nonobese males (BMI <30) between 19 and 39 years old.  Jimmy et.al. JCEM 2017,102;2918-8548. PMID: [id number].     • Total Cholesterol 10/22/2019 129  0 - 200 mg/dL Final   • Triglycerides 10/22/2019 201* 0 - 150 mg/dL Final   • HDL Cholesterol 10/22/2019 33* 40 - 60 mg/dL Final   • VLDL Cholesterol 10/22/2019 40.2  mg/dL Final   • LDL Cholesterol  10/22/2019 56  0 - 100 mg/dL Final   • Glucose 10/22/2019 189* 65 - 99 mg/dL Final   • BUN 10/22/2019 11  8 - 23 mg/dL Final   • Creatinine 10/22/2019 0.97  0.76 - 1.27 mg/dL Final   • eGFR Non  Am 10/22/2019 " 76  >60 mL/min/1.73 Final    Comment: The MDRD GFR formula is only valid for adults with stable  renal function between ages 18 and 70.     • eGFR  Am 10/22/2019 92  >60 mL/min/1.73 Final   • BUN/Creatinine Ratio 10/22/2019 11.3  7.0 - 25.0 Final   • Sodium 10/22/2019 142  136 - 145 mmol/L Final   • Potassium 10/22/2019 4.6  3.5 - 5.2 mmol/L Final   • Chloride 10/22/2019 101  98 - 107 mmol/L Final   • Total CO2 10/22/2019 28.2  22.0 - 29.0 mmol/L Final   • Calcium 10/22/2019 10.1  8.6 - 10.5 mg/dL Final   • Hemoglobin A1C 10/22/2019 7.90* 4.80 - 5.60 % Final    Comment: Hemoglobin A1C Ranges:  Increased Risk for Diabetes  5.7% to 6.4%  Diabetes                     >= 6.5%  Diabetic Goal                < 7.0%     • Interpretation 10/22/2019 Note   Final    Supplemental report is available.   • PDF Image 10/22/2019 Not applicable   Final     Lab Results   Component Value Date    HGBA1C 7.90 (H) 10/22/2019    HGBA1C 7.30 (H) 06/18/2019    HGBA1C 7.50 (H) 02/27/2019     Lab Results   Component Value Date    MICROALBUR 15.6 07/25/2018    CREATININE 0.97 10/22/2019     Imaging Results (Most Recent)     None                Assessment and Plan:    Andrea was seen today for diabetes.    Diagnoses and all orders for this visit:    Type 2 diabetes mellitus without complication, without long-term current use of insulin (CMS/[facility])  -     TSH; Future  -     T4, Free; Future  -     Hemoglobin A1c; Future  -     Basic Metabolic Panel; Future  -     Lipid Panel; Future  -     Microalbumin / Creatinine Urine Ratio - Urine, Clean Catch; Future  -     Vitamin B12 & Folate; Future    Mixed hyperlipidemia  -     TSH; Future  -     T4, Free; Future  -     Hemoglobin A1c; Future  -     Basic Metabolic Panel; Future  -     Lipid Panel; Future  -     Microalbumin / Creatinine Urine Ratio - Urine, Clean Catch; Future  -     Vitamin B12 & Folate; Future    Acquired hypothyroidism  -     TSH; Future  -     T4, Free; Future  -     Hemoglobin  "A1c; Future  -     Basic Metabolic Panel; Future  -     Lipid Panel; Future  -     Microalbumin / Creatinine Urine Ratio - Urine, Clean Catch; Future  -     Vitamin B12 & Folate; Future    Other orders  -     Discontinue: levothyroxine (SYNTHROID, LEVOTHROID) 50 MCG tablet; Take 1 tablet by mouth Daily.  -     levothyroxine (SYNTHROID, LEVOTHROID) 50 MCG tablet; Take 1 tablet by mouth Daily.        Type 2 diabetes mellitus-uncontrolled  HbA1c is worse since last visit  Increase Toujeo to 28 units in the morning  Continue Januvia and metformin  Recommended the patient to start with Ozempic, gave the titration instructions to the patient.  Emphasized to the patient the importance of being on Ozempic especially with the holiday season.    Hyperlipidemia  Continue Lipitor    Hypothyroidism  Levels are worse.  Change levothyroxine to 50 mcg oral daily.  Reviewed Lab results with the patient.             Asya Henao MD  11/05/19    [employer] / transcription disclaimer:     \"Dictated utilizing Dragon dictation\".  "

## 2019-11-20 RX ORDER — PEN NEEDLE, DIABETIC 32GX 5/32"
NEEDLE, DISPOSABLE MISCELLANEOUS
Qty: 200 EACH | Refills: 5 | Status: SHIPPED | OUTPATIENT
Start: 2019-11-20 | End: 2020-06-09 | Stop reason: CLARIF

## 2019-12-09 RX ORDER — ATORVASTATIN CALCIUM 20 MG/1
TABLET, FILM COATED ORAL
Qty: 90 TABLET | Refills: 4 | Status: SHIPPED | OUTPATIENT
Start: 2019-12-09 | End: 2020-07-31

## 2020-01-16 RX ORDER — OXCARBAZEPINE 600 MG/1
TABLET, FILM COATED ORAL
Qty: 180 TABLET | Refills: 5 | Status: SHIPPED | OUTPATIENT
Start: 2020-01-16 | End: 2020-10-20

## 2020-02-05 ENCOUNTER — RESULTS ENCOUNTER (OUTPATIENT)
Dept: ENDOCRINOLOGY | Age: 70
End: 2020-02-05

## 2020-02-05 DIAGNOSIS — E78.2 MIXED HYPERLIPIDEMIA: ICD-10-CM

## 2020-02-05 DIAGNOSIS — E03.9 ACQUIRED HYPOTHYROIDISM: ICD-10-CM

## 2020-02-05 DIAGNOSIS — E11.9 TYPE 2 DIABETES MELLITUS WITHOUT COMPLICATION, WITHOUT LONG-TERM CURRENT USE OF INSULIN (HCC): ICD-10-CM

## 2020-04-10 LAB
BUN SERPL-MCNC: 16 MG/DL (ref 8–23)
BUN/CREAT SERPL: 17.6 (ref 7–25)
CALCIUM SERPL-MCNC: 10.1 MG/DL (ref 8.6–10.5)
CHLORIDE SERPL-SCNC: 94 MMOL/L (ref 98–107)
CHOLEST SERPL-MCNC: 129 MG/DL (ref 0–200)
CO2 SERPL-SCNC: 27.6 MMOL/L (ref 22–29)
CREAT SERPL-MCNC: 0.91 MG/DL (ref 0.76–1.27)
FOLATE SERPL-MCNC: >20 NG/ML (ref 4.78–24.2)
GLUCOSE SERPL-MCNC: 115 MG/DL (ref 65–99)
HBA1C MFR BLD: 6.2 % (ref 4.8–5.6)
HDLC SERPL-MCNC: 37 MG/DL (ref 40–60)
INTERPRETATION: NORMAL
LDLC SERPL CALC-MCNC: 60 MG/DL (ref 0–100)
Lab: NORMAL
POTASSIUM SERPL-SCNC: 4.4 MMOL/L (ref 3.5–5.2)
SODIUM SERPL-SCNC: 135 MMOL/L (ref 136–145)
T4 FREE SERPL-MCNC: 0.92 NG/DL (ref 0.93–1.7)
TRIGL SERPL-MCNC: 159 MG/DL (ref 0–150)
TSH SERPL DL<=0.005 MIU/L-ACNC: 1.59 UIU/ML (ref 0.27–4.2)
UNABLE TO VOID: NORMAL
VIT B12 SERPL-MCNC: >2000 PG/ML (ref 211–946)
VLDLC SERPL CALC-MCNC: 31.8 MG/DL (ref 5–40)

## 2020-04-20 ENCOUNTER — OFFICE VISIT (OUTPATIENT)
Dept: ENDOCRINOLOGY | Age: 70
End: 2020-04-20

## 2020-04-20 DIAGNOSIS — E03.9 ACQUIRED HYPOTHYROIDISM: ICD-10-CM

## 2020-04-20 DIAGNOSIS — E78.2 MIXED HYPERLIPIDEMIA: ICD-10-CM

## 2020-04-20 DIAGNOSIS — Z79.4 LONG-TERM INSULIN USE (HCC): ICD-10-CM

## 2020-04-20 DIAGNOSIS — IMO0002 DM (DIABETES MELLITUS), TYPE 2, UNCONTROLLED W/NEUROLOGIC COMPLICATION: Primary | ICD-10-CM

## 2020-04-20 PROCEDURE — 99214 OFFICE O/P EST MOD 30 MIN: CPT | Performed by: INTERNAL MEDICINE

## 2020-04-20 NOTE — PROGRESS NOTES
70 y.o.    Patient Care Team:  Suresh Rolon Jr., MD as PCP - General (Family Medicine)  Asya Henao MD as Consulting Physician (Endocrinology)  John Mcguire MD as Consulting Physician (Pulmonary Disease)  Tamia Carrillo MD as Consulting Physician (Ophthalmology)  Jamal Prince MD as Consulting Physician (Orthopedic Surgery)    Chief Complaint:    FOLLOW UP/ TYPE 2 DIABETES MELLITUS   Subjective     HPI    Andrea Fam 70 y.o. presents with Type 2 dm as a follow up patient.   This was a telephone visit during the covid pandemic.      Type 2 dm - Diagnosed about 20 years ago. Was started on oral agents initially. Insulin was started about 10 years ago.   Today in clinic patient reports that he is on Toujeo 28 units in the morning, Ozempic 1 mg once a week, Januvia and metformin.  He has been more active in the last few months and this has significantly improved his blood sugars.  He has been checking his blood sugars 1-2 times a week.  Average blood sugars are around .  Blood sugars are documented below.  No significant low or high blood sugars.  Due for his eye examination.  History of cataract surgery in the past.  Diabetic neuropathy is stable on medication.  No history of CAD, CK D, CVA per patient.  He is physically active and reports that his weight is stable.  He tries to follow diabetic diet for most part.        Hyperlipidemia  On Lipitor 20 mg oral daily, fish oil 2 times daily.      Sleep apnea  Compliant with her CPAP machine.     Hypothyroidism  On levothyroxine 37.5 mcg oral daily.    You have chosen to receive care through a telephone visit. Do you consent to use a telephone visit for your medical care today? Yes    Reviewed primary care physician's/consulting physician documentation and lab results :     Interval History      The following portions of the patient's history were reviewed and updated as appropriate: allergies, current medications, past family history, past  medical history, past social history, past surgical history and problem list.    Past Medical History:   Diagnosis Date   • Arthritis    • Cataract     HAVING SURGERY IN JUNE   • High cholesterol    • Obstructive sleep apnea on CPAP    • Peripheral neuropathy     FINGERS AND LOWER LEGS   • Shoulder pain, right    • Testosterone deficiency    • Type 2 diabetes mellitus (CMS/HCC)      Family History   Problem Relation Age of Onset   • Alzheimer's disease Mother    • Lymphoma Paternal Grandmother    • Cerebral aneurysm Paternal Grandfather    • Colon cancer Neg Hx      Social History     Socioeconomic History   • Marital status:      Spouse name: Not on file   • Number of children: Not on file   • Years of education: Not on file   • Highest education level: Not on file   Tobacco Use   • Smoking status: Former Smoker     Packs/day: 1.50     Years: 30.00     Pack years: 45.00     Types: Cigarettes   • Smokeless tobacco: Never Used   • Tobacco comment: QUIT AGE 48   Substance and Sexual Activity   • Alcohol use: Yes     Comment: 2  drinks per  month   • Drug use: No   • Sexual activity: Defer     Allergies   Allergen Reactions   • Lisinopril Other (See Comments)     Diplopia         Current Outpatient Medications:   •  atorvastatin (LIPITOR) 20 MG tablet, TAKE 1 TABLET DAILY, Disp: 90 tablet, Rfl: 4  •  BD PEN NEEDLE MUNA U/F 32G X 4 MM misc, USE WITH INSULIN AS DIRECTED, Disp: 200 each, Rfl: 5  •  Blood Glucose Monitoring Suppl (ONETOUCH VERIO) w/Device kit, 1 Device As Needed (USE TO TO TEST BLOOD SUGAR 2 TIMES DAILY)., Disp: 1 kit, Rfl: 0  •  Cholecalciferol (VITAMIN D) 2000 UNITS tablet, Take 2,000 Units by mouth 2 (Two) Times a Day., Disp: , Rfl:   •  FOLIC ACID PO, Take 400 mcg by mouth 2 (Two) Times a Day. WILL HOLD PRIOR TO SURGERY, Disp: , Rfl:   •  glucose blood test strip, Use to test blood sugar 2 times daily, Disp: 100 each, Rfl: 9  •  Insulin Glargine (TOUJEO SOLOSTAR SC), Inject  under the skin into  the appropriate area as directed., Disp: , Rfl:   •  JANUVIA 100 MG tablet, TAKE 1 TABLET DAILY, Disp: 90 tablet, Rfl: 4  •  levothyroxine (SYNTHROID, LEVOTHROID) 50 MCG tablet, Take 1 tablet by mouth Daily., Disp: 90 tablet, Rfl: 3  •  metFORMIN (GLUCOPHAGE) 500 MG tablet, TAKE 2 TABLETS TWICE A DAY WITH MEALS, Disp: 360 tablet, Rfl: 4  •  Multiple Vitamin (MULTI-VITAMIN DAILY PO), Take  by mouth., Disp: , Rfl:   •  Omega-3 Fatty Acids (FISH OIL) 1000 MG capsule capsule, Take 1,200 mg by mouth Daily With Breakfast. PT TO HOLD MED PRIOR TO OR, Disp: , Rfl:   •  ONETOUCH DELICA LANCETS 33G misc, USE TO TEST BLOOD SUGAR 2 TIMES DAILY, Disp: 100 each, Rfl: 5  •  ONETOUCH VERIO test strip, USE TO TEST BLOOD SUGAR 2 TIMES DAILY, Disp: 100 each, Rfl: 5  •  OXcarbazepine (TRILEPTAL) 600 MG tablet, TAKE 1 TABLET IN THE MORNING AND ONE AND ONE-HALF TABLETS IN THE EVENING, Disp: 180 tablet, Rfl: 5  •  Semaglutide (OZEMPIC) 1 MG/DOSE solution pen-injector, Inject 1 mg under the skin into the appropriate area as directed 1 (One) Time Per Week., Disp: 5 pen, Rfl: 3  •  vitamin B-12 (CYANOCOBALAMIN) 1000 MCG tablet, Take 1,000 mcg by mouth Every Other Day. TAKES ON ODD NUMBER DAYS WILL HOLD PRIOR TO SURGERY, Disp: , Rfl:   •  vitamin C (ASCORBIC ACID) 500 MG tablet, Take 1,000 mg by mouth 2 (Two) Times a Day. PT TO HOLD PRIOR TO SURGERY, Disp: , Rfl:         Review of Systems   Constitutional: Negative for appetite change, fatigue and fever.   Eyes: Negative for visual disturbance.   Respiratory: Negative for shortness of breath.    Cardiovascular: Negative for palpitations and leg swelling.   Gastrointestinal: Negative for abdominal pain and vomiting.   Endocrine: Negative for polydipsia and polyuria.   Musculoskeletal: Negative for joint swelling and neck pain.   Skin: Negative for rash.   Neurological: Negative for weakness and numbness.   Psychiatric/Behavioral: Negative for behavioral problems.     I have reviewed the ROS as  documented by the MA; Asya Henao MD.      Objective       There were no vitals filed for this visit.  There is no height or weight on file to calculate BMI.      Physical Exam   General appearance-pleasant, no distress  Respiratory-normal breathing appreciated.  No respiratory distress noted  Ear nose and throat-no hard of hearing.  Neurological assessment-alert and oriented x3.    Results Review:     I reviewed the patient's new clinical results and mentioned them above in HPI and in plan as well.    Medical records reviewed  Summary:Done      Results Encounter on 02/05/2020   Component Date Value Ref Range Status   • TSH 04/09/2020 1.590  0.270 - 4.200 uIU/mL Final   • Free T4 04/09/2020 0.92* 0.93 - 1.70 ng/dL Final    Results may be falsely increased if patient taking Biotin.   • Hemoglobin A1C 04/09/2020 6.20* 4.80 - 5.60 % Final    Comment: Hemoglobin A1C Ranges:  Increased Risk for Diabetes  5.7% to 6.4%  Diabetes                     >= 6.5%  Diabetic Goal                < 7.0%     • Glucose 04/09/2020 115* 65 - 99 mg/dL Final   • BUN 04/09/2020 16  8 - 23 mg/dL Final   • Creatinine 04/09/2020 0.91  0.76 - 1.27 mg/dL Final   • eGFR Non African Am 04/09/2020 82  >60 mL/min/1.73 Final    Comment: The MDRD GFR formula is only valid for adults with stable  renal function between ages 18 and 70.     • eGFR  Am 04/09/2020 99  >60 mL/min/1.73 Final   • BUN/Creatinine Ratio 04/09/2020 17.6  7.0 - 25.0 Final   • Sodium 04/09/2020 135* 136 - 145 mmol/L Final   • Potassium 04/09/2020 4.4  3.5 - 5.2 mmol/L Final   • Chloride 04/09/2020 94* 98 - 107 mmol/L Final   • Total CO2 04/09/2020 27.6  22.0 - 29.0 mmol/L Final   • Calcium 04/09/2020 10.1  8.6 - 10.5 mg/dL Final   • Total Cholesterol 04/09/2020 129  0 - 200 mg/dL Final   • Triglycerides 04/09/2020 159* 0 - 150 mg/dL Final   • HDL Cholesterol 04/09/2020 37* 40 - 60 mg/dL Final   • VLDL Cholesterol 04/09/2020 31.8  5 - 40 mg/dL Final   • LDL Cholesterol   04/09/2020 60  0 - 100 mg/dL Final   • Vitamin B-12 04/09/2020 >2000* 211 - 946 pg/mL Final    Results may be falsely increased if patient taking Biotin.   • Folate 04/09/2020 >20.00  4.78 - 24.20 ng/mL Final    Results may be falsely increased if patient taking Biotin.   • Unable to Void 04/09/2020 Comment   Final    Comment: The patient was not able to render a urine sample and has been  instructed to return for a urine collection at their earliest  convenience.  The urine testing that you have requested has  been deleted from this report.  When the patient returns and  provides a urine specimen, the urine testing will be performed  and separately reported.     • Interpretation 04/09/2020 Note   Final    Supplemental report is available.   • PDF Image 04/09/2020 Not applicable   Final     Lab Results   Component Value Date    HGBA1C 6.20 (H) 04/09/2020    HGBA1C 7.90 (H) 10/22/2019    HGBA1C 7.30 (H) 06/18/2019     Lab Results   Component Value Date    MICROALBUR 15.6 07/25/2018    CREATININE 0.91 04/09/2020     Imaging Results (Most Recent)     None                Assessment and Plan:    Andrea was seen today for diabetes.    Diagnoses and all orders for this visit:    DM (diabetes mellitus), type 2, uncontrolled w/neurologic complication (CMS/[facility])  -     Hemoglobin A1c; Future  -     Basic Metabolic Panel; Future  -     Lipid Panel; Future  -     Microalbumin / Creatinine Urine Ratio - Urine, Clean Catch; Future  -     TSH; Future  -     Vitamin B12 & Folate; Future  -     T4, Free; Future    Mixed hyperlipidemia  -     Hemoglobin A1c; Future  -     Basic Metabolic Panel; Future  -     Lipid Panel; Future  -     Microalbumin / Creatinine Urine Ratio - Urine, Clean Catch; Future  -     TSH; Future  -     Vitamin B12 & Folate; Future  -     T4, Free; Future    Acquired hypothyroidism  -     Hemoglobin A1c; Future  -     Basic Metabolic Panel; Future  -     Lipid Panel; Future  -     Microalbumin / Creatinine Urine  "Ratio - Urine, Clean Catch; Future  -     TSH; Future  -     Vitamin B12 & Folate; Future  -     T4, Free; Future    Long-term insulin use (CMS/HCC)  -     Hemoglobin A1c; Future  -     Basic Metabolic Panel; Future  -     Lipid Panel; Future  -     Microalbumin / Creatinine Urine Ratio - Urine, Clean Catch; Future  -     TSH; Future  -     Vitamin B12 & Folate; Future  -     T4, Free; Future      Type 2 diabetes mellitus-uncontrolled  Continue the current insulin regimen  Emphasized to the patient on the activity regimen that he could do during the covid pandemic to keep his blood sugars down.    Hypothyroidism  Continue levothyroxine    Hyperlipidemia  Continue Lipitor    Reviewed Lab results with the patient.       13   ( greater than 50% of the time) out of  25 minutes  spent counseling the patient on activity changes during the pandemic,   Follow-up instructions and blood work-up that is necessary    Asya Henao MD  04/20/20    [employer] / transcription disclaimer:     \"Dictated utilizing Dragon dictation\".      "

## 2020-05-12 RX ORDER — INSULIN GLARGINE 300 U/ML
INJECTION, SOLUTION SUBCUTANEOUS
Qty: 13.5 ML | Refills: 4 | Status: SHIPPED | OUTPATIENT
Start: 2020-05-12 | End: 2020-07-14 | Stop reason: SDUPTHER

## 2020-05-28 ENCOUNTER — TELEPHONE (OUTPATIENT)
Dept: ENDOCRINOLOGY | Age: 70
End: 2020-05-28

## 2020-06-03 ENCOUNTER — TELEPHONE (OUTPATIENT)
Dept: ENDOCRINOLOGY | Age: 70
End: 2020-06-03

## 2020-06-19 ENCOUNTER — PATIENT MESSAGE (OUTPATIENT)
Dept: INTERNAL MEDICINE | Facility: CLINIC | Age: 70
End: 2020-06-19

## 2020-06-22 NOTE — TELEPHONE ENCOUNTER
From: Andrea Fam  To: Suresh Rolon Jr., MD  Sent: 6/19/2020 4:54 PM EDT  Subject: Non-Urgent Medical Question    How do I go about making an appointment with you?

## 2020-07-09 ENCOUNTER — TELEPHONE (OUTPATIENT)
Dept: ENDOCRINOLOGY | Age: 70
End: 2020-07-09

## 2020-07-09 NOTE — TELEPHONE ENCOUNTER
Patient wife called stating the he just got discharge from the hospital due to a very bad UTI  They  have patient on Cipro antibiotics  500 mg every 12 hours patient wife read that this can cause a lot of issues with patient blood sugar and is worried that he should be taking it he is on toujeo 22 units at night  januvia  Metformin  ozempic  But is not currently taking that

## 2020-07-09 NOTE — TELEPHONE ENCOUNTER
Spoke with patient wife in regard to patient new antibiotic that he his taking   Let her know that she should take his blood sugar more often to make sure that his blood sugar are not rising or going down so this today and over the weekend if she see a rise or decline call us to make adjust she voice understanding

## 2020-07-14 ENCOUNTER — OFFICE VISIT (OUTPATIENT)
Dept: INTERNAL MEDICINE | Facility: CLINIC | Age: 70
End: 2020-07-14

## 2020-07-14 VITALS
DIASTOLIC BLOOD PRESSURE: 68 MMHG | WEIGHT: 194 LBS | SYSTOLIC BLOOD PRESSURE: 124 MMHG | BODY MASS INDEX: 27.77 KG/M2 | HEART RATE: 85 BPM | HEIGHT: 70 IN | OXYGEN SATURATION: 98 %

## 2020-07-14 DIAGNOSIS — S76.111A STRAIN OF RIGHT QUADRICEPS, INITIAL ENCOUNTER: ICD-10-CM

## 2020-07-14 DIAGNOSIS — R65.20 SEVERE SEPSIS (HCC): Primary | ICD-10-CM

## 2020-07-14 DIAGNOSIS — A41.9 SEVERE SEPSIS (HCC): Primary | ICD-10-CM

## 2020-07-14 DIAGNOSIS — N30.00 ACUTE CYSTITIS WITHOUT HEMATURIA: ICD-10-CM

## 2020-07-14 PROBLEM — I10 HTN (HYPERTENSION): Status: ACTIVE | Noted: 2020-07-06

## 2020-07-14 PROBLEM — E03.9 HYPOTHYROIDISM: Status: ACTIVE | Noted: 2020-07-06

## 2020-07-14 PROBLEM — R65.21 SEPTIC SHOCK (HCC): Status: ACTIVE | Noted: 2020-07-06

## 2020-07-14 PROBLEM — Z20.822 ENCOUNTER FOR LABORATORY TESTING FOR COVID-19 VIRUS: Status: ACTIVE | Noted: 2020-07-06

## 2020-07-14 PROCEDURE — 99214 OFFICE O/P EST MOD 30 MIN: CPT | Performed by: NURSE PRACTITIONER

## 2020-07-14 RX ORDER — CIPROFLOXACIN 500 MG/1
TABLET, FILM COATED ORAL
COMMUNITY
Start: 2020-07-09 | End: 2020-07-31

## 2020-07-14 NOTE — PROGRESS NOTES
Subjective   Andrea Fam is a 70 y.o. male.     He was admitted to [facility] on 7/6/2020 secondary to urosepsis. He was discharged on 7/9/2020. He has given IV rocephin during hospital stay and discharged on Cipro for total of 14 days. He also was treated for strain of right quadriceps/effusion of right knee. He saw ortho yesterday and fluid drained and steroid injection. He reports symptoms have improved significantly. He may have MRI on Friday depending on how he responds to current treatment.     Urinary Tract Infection    This is a new problem. The current episode started 1 to 4 weeks ago. The problem has been gradually improving. The pain is at a severity of 0/10. The patient is experiencing no pain. There has been no fever. He is not sexually active. There is no history of pyelonephritis. Associated symptoms include frequency. Pertinent negatives include no chills (resolved ), discharge, flank pain, hematuria, nausea, sweats or urgency. He has tried antibiotics and increased fluids for the symptoms. The treatment provided moderate relief. There is no history of recurrent UTIs.        The following portions of the patient's history were reviewed and updated as appropriate: allergies, current medications, past family history, past medical history, past social history, past surgical history and problem list.    Review of Systems   Constitutional: Negative for activity change, appetite change, chills (resolved ), fatigue and fever.   Respiratory: Negative for cough, shortness of breath and wheezing.    Cardiovascular: Negative for chest pain, palpitations and leg swelling.   Gastrointestinal: Negative for blood in stool, diarrhea and nausea.        No rectal pain or pressure    Genitourinary: Positive for frequency. Negative for decreased urine volume, discharge, dysuria, flank pain, hematuria, testicular pain and urgency.        Nocturia-   Musculoskeletal: Positive for arthralgias (right leg ), gait  problem (r/t right leg pain ) and joint swelling (right knee ).       Objective   Physical Exam   Constitutional: He is oriented to person, place, and time. He appears well-developed and well-nourished.   HENT:   Head: Normocephalic.   Nose: Nose normal.   Cardiovascular: Regular rhythm and normal heart sounds. Exam reveals no S3 and no S4.   No murmur heard.  Repeat bp left arm 122/68  No pedal edema    Pulmonary/Chest: Effort normal and breath sounds normal. He has no decreased breath sounds. He has no wheezes. He has no rhonchi. He has no rales.   Musculoskeletal: He exhibits no edema.        Right knee: He exhibits normal range of motion and no swelling. Tenderness found.        Left knee: He exhibits normal range of motion and no swelling. No tenderness found.   Neurological: He is alert and oriented to person, place, and time. Gait normal.   Skin: Skin is warm and dry.   Psychiatric: He has a normal mood and affect.       Assessment/Plan   Andrea was seen today for hospital follow up.    Diagnoses and all orders for this visit:    Severe sepsis (CMS/[facility])  Comments:  much improved     Acute cystitis without hematuria  Comments:  improved   Orders:  -     Urinalysis With Microscopic If Indicated (No Culture) - Urine, Clean Catch; Future  -     Urine Culture - , Urine, Clean Catch; Future    Strain of right quadriceps, initial encounter  Comments:  seen ortho yesterday; much improved         This patient is new to me. I have reviewed recent hospital discharge summary, imaging and labs. He was accompanied by spouse     Urosepsis: he will return next week for repeat urine, due to complete cipro on Sunday. He declines urologist referral at this time.       Current outpatient and discharge medications have been reconciled for the patient.  Reviewed by: NATALIE Campbell

## 2020-07-21 ENCOUNTER — RESULTS ENCOUNTER (OUTPATIENT)
Dept: INTERNAL MEDICINE | Facility: CLINIC | Age: 70
End: 2020-07-21

## 2020-07-21 ENCOUNTER — LAB (OUTPATIENT)
Dept: INTERNAL MEDICINE | Facility: CLINIC | Age: 70
End: 2020-07-21

## 2020-07-21 DIAGNOSIS — N30.00 ACUTE CYSTITIS WITHOUT HEMATURIA: Primary | ICD-10-CM

## 2020-07-21 DIAGNOSIS — N30.00 ACUTE CYSTITIS WITHOUT HEMATURIA: ICD-10-CM

## 2020-07-21 LAB
BILIRUB UR QL STRIP: NEGATIVE
CLARITY UR: CLEAR
COLOR UR: YELLOW
GLUCOSE UR STRIP-MCNC: ABNORMAL MG/DL
HGB UR QL STRIP.AUTO: NEGATIVE
KETONES UR QL STRIP: ABNORMAL
LEUKOCYTE ESTERASE UR QL STRIP.AUTO: NEGATIVE
NITRITE UR QL STRIP: NEGATIVE
PH UR STRIP.AUTO: <=5 [PH] (ref 5–8)
PROT UR QL STRIP: NEGATIVE
SP GR UR STRIP: >=1.03 (ref 1–1.03)
UROBILINOGEN UR QL STRIP: ABNORMAL

## 2020-07-23 LAB
BACTERIA UR CULT: NO GROWTH
BACTERIA UR CULT: NORMAL

## 2020-07-30 ENCOUNTER — TRANSCRIBE ORDERS (OUTPATIENT)
Dept: PREADMISSION TESTING | Facility: HOSPITAL | Age: 70
End: 2020-07-30

## 2020-07-30 DIAGNOSIS — Z01.818 OTHER SPECIFIED PRE-OPERATIVE EXAMINATION: Primary | ICD-10-CM

## 2020-07-31 ENCOUNTER — APPOINTMENT (OUTPATIENT)
Dept: PREADMISSION TESTING | Facility: HOSPITAL | Age: 70
End: 2020-07-31

## 2020-07-31 VITALS
SYSTOLIC BLOOD PRESSURE: 140 MMHG | TEMPERATURE: 97.7 F | RESPIRATION RATE: 18 BRPM | WEIGHT: 191 LBS | HEIGHT: 70 IN | BODY MASS INDEX: 27.35 KG/M2 | HEART RATE: 85 BPM | OXYGEN SATURATION: 97 % | DIASTOLIC BLOOD PRESSURE: 72 MMHG

## 2020-07-31 LAB
ALBUMIN SERPL-MCNC: 4.4 G/DL (ref 3.5–5.2)
ALBUMIN/GLOB SERPL: 1.6 G/DL
ALP SERPL-CCNC: 57 U/L (ref 39–117)
ALT SERPL W P-5'-P-CCNC: 24 U/L (ref 1–41)
ANION GAP SERPL CALCULATED.3IONS-SCNC: 12.7 MMOL/L (ref 5–15)
AST SERPL-CCNC: 19 U/L (ref 1–40)
BASOPHILS # BLD AUTO: 0.06 10*3/MM3 (ref 0–0.2)
BASOPHILS NFR BLD AUTO: 0.9 % (ref 0–1.5)
BILIRUB SERPL-MCNC: 0.4 MG/DL (ref 0–1.2)
BUN SERPL-MCNC: 10 MG/DL (ref 8–23)
BUN/CREAT SERPL: 12.5 (ref 7–25)
CALCIUM SPEC-SCNC: 9.8 MG/DL (ref 8.6–10.5)
CHLORIDE SERPL-SCNC: 88 MMOL/L (ref 98–107)
CO2 SERPL-SCNC: 24.3 MMOL/L (ref 22–29)
CREAT SERPL-MCNC: 0.8 MG/DL (ref 0.76–1.27)
DEPRECATED RDW RBC AUTO: 43.8 FL (ref 37–54)
EOSINOPHIL # BLD AUTO: 0.35 10*3/MM3 (ref 0–0.4)
EOSINOPHIL NFR BLD AUTO: 5.2 % (ref 0.3–6.2)
ERYTHROCYTE [DISTWIDTH] IN BLOOD BY AUTOMATED COUNT: 13.1 % (ref 12.3–15.4)
GFR SERPL CREATININE-BSD FRML MDRD: 95 ML/MIN/1.73
GLOBULIN UR ELPH-MCNC: 2.7 GM/DL
GLUCOSE SERPL-MCNC: 64 MG/DL (ref 65–99)
HCT VFR BLD AUTO: 37.8 % (ref 37.5–51)
HGB BLD-MCNC: 13 G/DL (ref 13–17.7)
IMM GRANULOCYTES # BLD AUTO: 0.02 10*3/MM3 (ref 0–0.05)
IMM GRANULOCYTES NFR BLD AUTO: 0.3 % (ref 0–0.5)
LYMPHOCYTES # BLD AUTO: 2.54 10*3/MM3 (ref 0.7–3.1)
LYMPHOCYTES NFR BLD AUTO: 37.6 % (ref 19.6–45.3)
MCH RBC QN AUTO: 31.6 PG (ref 26.6–33)
MCHC RBC AUTO-ENTMCNC: 34.4 G/DL (ref 31.5–35.7)
MCV RBC AUTO: 92 FL (ref 79–97)
MONOCYTES # BLD AUTO: 0.53 10*3/MM3 (ref 0.1–0.9)
MONOCYTES NFR BLD AUTO: 7.9 % (ref 5–12)
NEUTROPHILS NFR BLD AUTO: 3.25 10*3/MM3 (ref 1.7–7)
NEUTROPHILS NFR BLD AUTO: 48.1 % (ref 42.7–76)
NRBC BLD AUTO-RTO: 0 /100 WBC (ref 0–0.2)
PLATELET # BLD AUTO: 207 10*3/MM3 (ref 140–450)
PMV BLD AUTO: 9.1 FL (ref 6–12)
POTASSIUM SERPL-SCNC: 4.1 MMOL/L (ref 3.5–5.2)
PROT SERPL-MCNC: 7.1 G/DL (ref 6–8.5)
RBC # BLD AUTO: 4.11 10*6/MM3 (ref 4.14–5.8)
SODIUM SERPL-SCNC: 125 MMOL/L (ref 136–145)
WBC # BLD AUTO: 6.75 10*3/MM3 (ref 3.4–10.8)

## 2020-07-31 PROCEDURE — 36415 COLL VENOUS BLD VENIPUNCTURE: CPT

## 2020-07-31 PROCEDURE — 93010 ELECTROCARDIOGRAM REPORT: CPT | Performed by: INTERNAL MEDICINE

## 2020-07-31 PROCEDURE — 93005 ELECTROCARDIOGRAM TRACING: CPT

## 2020-07-31 PROCEDURE — 80053 COMPREHEN METABOLIC PANEL: CPT | Performed by: ORTHOPAEDIC SURGERY

## 2020-07-31 PROCEDURE — 85025 COMPLETE CBC W/AUTO DIFF WBC: CPT | Performed by: ORTHOPAEDIC SURGERY

## 2020-07-31 RX ORDER — ATORVASTATIN CALCIUM 10 MG/1
10 TABLET, FILM COATED ORAL NIGHTLY
COMMUNITY
End: 2021-02-25

## 2020-08-01 ENCOUNTER — LAB (OUTPATIENT)
Dept: LAB | Facility: HOSPITAL | Age: 70
End: 2020-08-01

## 2020-08-01 DIAGNOSIS — Z01.818 OTHER SPECIFIED PRE-OPERATIVE EXAMINATION: ICD-10-CM

## 2020-08-01 PROCEDURE — C9803 HOPD COVID-19 SPEC COLLECT: HCPCS

## 2020-08-01 PROCEDURE — U0004 COV-19 TEST NON-CDC HGH THRU: HCPCS

## 2020-08-03 LAB
REF LAB TEST METHOD: NORMAL
SARS-COV-2 RNA RESP QL NAA+PROBE: NOT DETECTED

## 2020-08-04 ENCOUNTER — ANESTHESIA EVENT (OUTPATIENT)
Dept: PERIOP | Facility: HOSPITAL | Age: 70
End: 2020-08-04

## 2020-08-04 ENCOUNTER — ANESTHESIA (OUTPATIENT)
Dept: PERIOP | Facility: HOSPITAL | Age: 70
End: 2020-08-04

## 2020-08-04 ENCOUNTER — HOSPITAL ENCOUNTER (OUTPATIENT)
Facility: HOSPITAL | Age: 70
Setting detail: HOSPITAL OUTPATIENT SURGERY
Discharge: HOME | End: 2020-08-04
Attending: ORTHOPAEDIC SURGERY | Admitting: ORTHOPAEDIC SURGERY

## 2020-08-04 VITALS
HEART RATE: 80 BPM | WEIGHT: 193.38 LBS | BODY MASS INDEX: 27.69 KG/M2 | HEIGHT: 70 IN | OXYGEN SATURATION: 95 % | TEMPERATURE: 98 F | DIASTOLIC BLOOD PRESSURE: 73 MMHG | SYSTOLIC BLOOD PRESSURE: 144 MMHG | RESPIRATION RATE: 16 BRPM

## 2020-08-04 DIAGNOSIS — S76.119A RUPTURE OF QUADRICEPS TENDON, UNSPECIFIED LATERALITY, INITIAL ENCOUNTER: Primary | ICD-10-CM

## 2020-08-04 LAB
GLUCOSE BLDC GLUCOMTR-MCNC: 109 MG/DL (ref 70–130)
GLUCOSE BLDC GLUCOMTR-MCNC: 110 MG/DL (ref 70–130)

## 2020-08-04 PROCEDURE — 82962 GLUCOSE BLOOD TEST: CPT

## 2020-08-04 PROCEDURE — 25010000002 ONDANSETRON PER 1 MG: Performed by: NURSE ANESTHETIST, CERTIFIED REGISTERED

## 2020-08-04 PROCEDURE — 25010000002 FENTANYL CITRATE (PF) 100 MCG/2ML SOLUTION: Performed by: NURSE ANESTHETIST, CERTIFIED REGISTERED

## 2020-08-04 PROCEDURE — 25010000002 PROPOFOL 10 MG/ML EMULSION: Performed by: NURSE ANESTHETIST, CERTIFIED REGISTERED

## 2020-08-04 PROCEDURE — 25010000003 CEFAZOLIN IN DEXTROSE 2-4 GM/100ML-% SOLUTION: Performed by: NURSE ANESTHETIST, CERTIFIED REGISTERED

## 2020-08-04 PROCEDURE — L1830 KO IMMOB CANVAS LONG PRE OTS: HCPCS | Performed by: ORTHOPAEDIC SURGERY

## 2020-08-04 DEVICE — DEV CONTRL TISS STRATAFIX SPIRAL PDS PLS CT1 2-0 45CM: Type: IMPLANTABLE DEVICE | Site: FEMUR | Status: FUNCTIONAL

## 2020-08-04 DEVICE — SUT FW 5 W .5 CIR CUT NDL 48M AR7211: Type: IMPLANTABLE DEVICE | Site: FEMUR | Status: FUNCTIONAL

## 2020-08-04 RX ORDER — FENTANYL CITRATE 50 UG/ML
50 INJECTION, SOLUTION INTRAMUSCULAR; INTRAVENOUS
Status: DISCONTINUED | OUTPATIENT
Start: 2020-08-04 | End: 2020-08-04 | Stop reason: HOSPADM

## 2020-08-04 RX ORDER — ONDANSETRON 2 MG/ML
4 INJECTION INTRAMUSCULAR; INTRAVENOUS ONCE AS NEEDED
Status: DISCONTINUED | OUTPATIENT
Start: 2020-08-04 | End: 2020-08-04 | Stop reason: HOSPADM

## 2020-08-04 RX ORDER — LIDOCAINE HYDROCHLORIDE 10 MG/ML
0.5 INJECTION, SOLUTION EPIDURAL; INFILTRATION; INTRACAUDAL; PERINEURAL ONCE AS NEEDED
Status: DISCONTINUED | OUTPATIENT
Start: 2020-08-04 | End: 2020-08-04 | Stop reason: HOSPADM

## 2020-08-04 RX ORDER — FLUMAZENIL 0.1 MG/ML
0.2 INJECTION INTRAVENOUS AS NEEDED
Status: DISCONTINUED | OUTPATIENT
Start: 2020-08-04 | End: 2020-08-04 | Stop reason: HOSPADM

## 2020-08-04 RX ORDER — BUPIVACAINE HYDROCHLORIDE 5 MG/ML
INJECTION, SOLUTION EPIDURAL; INTRACAUDAL AS NEEDED
Status: DISCONTINUED | OUTPATIENT
Start: 2020-08-04 | End: 2020-08-04 | Stop reason: HOSPADM

## 2020-08-04 RX ORDER — HYDROMORPHONE HYDROCHLORIDE 1 MG/ML
0.5 INJECTION, SOLUTION INTRAMUSCULAR; INTRAVENOUS; SUBCUTANEOUS
Status: DISCONTINUED | OUTPATIENT
Start: 2020-08-04 | End: 2020-08-04 | Stop reason: HOSPADM

## 2020-08-04 RX ORDER — HYDRALAZINE HYDROCHLORIDE 20 MG/ML
5 INJECTION INTRAMUSCULAR; INTRAVENOUS
Status: DISCONTINUED | OUTPATIENT
Start: 2020-08-04 | End: 2020-08-04 | Stop reason: HOSPADM

## 2020-08-04 RX ORDER — FENTANYL CITRATE 50 UG/ML
INJECTION, SOLUTION INTRAMUSCULAR; INTRAVENOUS AS NEEDED
Status: DISCONTINUED | OUTPATIENT
Start: 2020-08-04 | End: 2020-08-04 | Stop reason: SURG

## 2020-08-04 RX ORDER — NALOXONE HCL 0.4 MG/ML
0.2 VIAL (ML) INJECTION AS NEEDED
Status: DISCONTINUED | OUTPATIENT
Start: 2020-08-04 | End: 2020-08-04 | Stop reason: HOSPADM

## 2020-08-04 RX ORDER — MAGNESIUM HYDROXIDE 1200 MG/15ML
LIQUID ORAL AS NEEDED
Status: DISCONTINUED | OUTPATIENT
Start: 2020-08-04 | End: 2020-08-04 | Stop reason: HOSPADM

## 2020-08-04 RX ORDER — SODIUM CHLORIDE 0.9 % (FLUSH) 0.9 %
3-10 SYRINGE (ML) INJECTION AS NEEDED
Status: DISCONTINUED | OUTPATIENT
Start: 2020-08-04 | End: 2020-08-04 | Stop reason: HOSPADM

## 2020-08-04 RX ORDER — HYDROCODONE BITARTRATE AND ACETAMINOPHEN 7.5; 325 MG/1; MG/1
1 TABLET ORAL ONCE AS NEEDED
Status: COMPLETED | OUTPATIENT
Start: 2020-08-04 | End: 2020-08-04

## 2020-08-04 RX ORDER — EPHEDRINE SULFATE 50 MG/ML
5 INJECTION, SOLUTION INTRAVENOUS ONCE AS NEEDED
Status: DISCONTINUED | OUTPATIENT
Start: 2020-08-04 | End: 2020-08-04 | Stop reason: HOSPADM

## 2020-08-04 RX ORDER — PROPOFOL 10 MG/ML
VIAL (ML) INTRAVENOUS AS NEEDED
Status: DISCONTINUED | OUTPATIENT
Start: 2020-08-04 | End: 2020-08-04 | Stop reason: SURG

## 2020-08-04 RX ORDER — SODIUM CHLORIDE, SODIUM LACTATE, POTASSIUM CHLORIDE, CALCIUM CHLORIDE 600; 310; 30; 20 MG/100ML; MG/100ML; MG/100ML; MG/100ML
9 INJECTION, SOLUTION INTRAVENOUS CONTINUOUS
Status: DISCONTINUED | OUTPATIENT
Start: 2020-08-04 | End: 2020-08-04 | Stop reason: HOSPADM

## 2020-08-04 RX ORDER — OXYCODONE AND ACETAMINOPHEN 7.5; 325 MG/1; MG/1
1 TABLET ORAL ONCE AS NEEDED
Status: DISCONTINUED | OUTPATIENT
Start: 2020-08-04 | End: 2020-08-04 | Stop reason: HOSPADM

## 2020-08-04 RX ORDER — ACETAMINOPHEN 325 MG/1
650 TABLET ORAL ONCE AS NEEDED
Status: DISCONTINUED | OUTPATIENT
Start: 2020-08-04 | End: 2020-08-04 | Stop reason: HOSPADM

## 2020-08-04 RX ORDER — HYDROCODONE BITARTRATE AND ACETAMINOPHEN 5; 325 MG/1; MG/1
1 TABLET ORAL EVERY 4 HOURS PRN
Qty: 30 TABLET | Refills: 0 | Status: SHIPPED | OUTPATIENT
Start: 2020-08-04 | End: 2020-10-20

## 2020-08-04 RX ORDER — SODIUM CHLORIDE 0.9 % (FLUSH) 0.9 %
3 SYRINGE (ML) INJECTION EVERY 12 HOURS SCHEDULED
Status: DISCONTINUED | OUTPATIENT
Start: 2020-08-04 | End: 2020-08-04 | Stop reason: HOSPADM

## 2020-08-04 RX ORDER — LIDOCAINE HYDROCHLORIDE 20 MG/ML
INJECTION, SOLUTION INFILTRATION; PERINEURAL AS NEEDED
Status: DISCONTINUED | OUTPATIENT
Start: 2020-08-04 | End: 2020-08-04 | Stop reason: SURG

## 2020-08-04 RX ORDER — DIPHENHYDRAMINE HYDROCHLORIDE 50 MG/ML
12.5 INJECTION INTRAMUSCULAR; INTRAVENOUS
Status: DISCONTINUED | OUTPATIENT
Start: 2020-08-04 | End: 2020-08-04 | Stop reason: HOSPADM

## 2020-08-04 RX ORDER — DIPHENHYDRAMINE HCL 25 MG
25 CAPSULE ORAL
Status: DISCONTINUED | OUTPATIENT
Start: 2020-08-04 | End: 2020-08-04 | Stop reason: HOSPADM

## 2020-08-04 RX ORDER — FAMOTIDINE 10 MG/ML
20 INJECTION, SOLUTION INTRAVENOUS ONCE
Status: COMPLETED | OUTPATIENT
Start: 2020-08-04 | End: 2020-08-04

## 2020-08-04 RX ORDER — CEFAZOLIN SODIUM 2 G/100ML
INJECTION, SOLUTION INTRAVENOUS AS NEEDED
Status: DISCONTINUED | OUTPATIENT
Start: 2020-08-04 | End: 2020-08-04 | Stop reason: SURG

## 2020-08-04 RX ORDER — ONDANSETRON 2 MG/ML
INJECTION INTRAMUSCULAR; INTRAVENOUS AS NEEDED
Status: DISCONTINUED | OUTPATIENT
Start: 2020-08-04 | End: 2020-08-04 | Stop reason: SURG

## 2020-08-04 RX ORDER — MIDAZOLAM HYDROCHLORIDE 1 MG/ML
1 INJECTION INTRAMUSCULAR; INTRAVENOUS
Status: DISCONTINUED | OUTPATIENT
Start: 2020-08-04 | End: 2020-08-04 | Stop reason: HOSPADM

## 2020-08-04 RX ADMIN — LIDOCAINE HYDROCHLORIDE 100 MG: 20 INJECTION, SOLUTION INFILTRATION; PERINEURAL at 06:57

## 2020-08-04 RX ADMIN — SODIUM CHLORIDE, POTASSIUM CHLORIDE, SODIUM LACTATE AND CALCIUM CHLORIDE 9 ML/HR: 600; 310; 30; 20 INJECTION, SOLUTION INTRAVENOUS at 06:40

## 2020-08-04 RX ADMIN — FENTANYL CITRATE 50 MCG: 50 INJECTION INTRAMUSCULAR; INTRAVENOUS at 06:57

## 2020-08-04 RX ADMIN — CEFAZOLIN SODIUM 2 G: 2 INJECTION, SOLUTION INTRAVENOUS at 07:03

## 2020-08-04 RX ADMIN — PROPOFOL 150 MG: 10 INJECTION, EMULSION INTRAVENOUS at 06:57

## 2020-08-04 RX ADMIN — FENTANYL CITRATE 50 MCG: 50 INJECTION, SOLUTION INTRAMUSCULAR; INTRAVENOUS at 08:10

## 2020-08-04 RX ADMIN — HYDROCODONE BITARTRATE AND ACETAMINOPHEN 1 TABLET: 7.5; 325 TABLET ORAL at 08:25

## 2020-08-04 RX ADMIN — FAMOTIDINE 20 MG: 10 INJECTION INTRAVENOUS at 06:40

## 2020-08-04 RX ADMIN — ONDANSETRON HYDROCHLORIDE 4 MG: 2 SOLUTION INTRAMUSCULAR; INTRAVENOUS at 07:27

## 2020-08-04 NOTE — ANESTHESIA POSTPROCEDURE EVALUATION
"Patient: Andrea Fam    Procedure Summary     Date:  08/04/20 Room / Location:  [facility] OR [address] [place] [address] MAIN OR    Anesthesia Start:  0653 Anesthesia Stop:  0750    Procedure:  RIGHT OPEN  QUADRICEPS TENDON REPAIR (Right Thigh) Diagnosis:      Surgeon:  Bahman Ordonez II, MD Provider:  Ginny Bhagat MD    Anesthesia Type:  general with block ASA Status:  3          Anesthesia Type: general with block    Vitals  Vitals Value Taken Time   /94 8/4/2020  8:30 AM   Temp 36.7 °C (98 °F) 8/4/2020  7:50 AM   Pulse 81 8/4/2020  8:28 AM   Resp 16 8/4/2020  8:15 AM   SpO2 95 % 8/4/2020  8:34 AM   Vitals shown include unvalidated device data.        Post Anesthesia Care and Evaluation    Patient location during evaluation: bedside  Patient participation: complete - patient participated  Level of consciousness: sleepy but conscious  Pain score: 0  Pain management: adequate  Airway patency: patent  Anesthetic complications: No anesthetic complications    Cardiovascular status: acceptable  Respiratory status: acceptable  Hydration status: acceptable    Comments: /94   Pulse 78   Temp 36.7 °C (98 °F) (Oral)   Resp 16   Ht 177.8 cm (70\")   Wt 87.7 kg (193 lb 6 oz)   SpO2 94%   BMI 27.75 kg/m²         "

## 2020-08-04 NOTE — ANESTHESIA PREPROCEDURE EVALUATION
Anesthesia Evaluation                  Airway   Mallampati: II  TM distance: >3 FB  Neck ROM: full  No difficulty expected  Dental - normal exam     Pulmonary - normal exam   (+) a smoker Former, sleep apnea,   Cardiovascular - normal exam    (+) hypertension, hyperlipidemia,       Neuro/Psych  (+) numbness,     GI/Hepatic/Renal/Endo    (+)   diabetes mellitus using insulin, thyroid problem     Musculoskeletal     Abdominal    Substance History      OB/GYN          Other   arthritis,                      Anesthesia Plan    ASA 3     general with block     intravenous induction     Anesthetic plan, all risks, benefits, and alternatives have been provided, discussed and informed consent has been obtained with: patient.

## 2020-08-04 NOTE — ADDENDUM NOTE
Addendum  created 08/04/20 0943 by Ginny Bhagat MD    Attestation recorded in Intraprocedure, Intraprocedure Attestations filed

## 2020-08-04 NOTE — OP NOTE
YakovTendon Open Repair Operative Note  Dr. ELENA Juarez” Mery II  [phone]    PATIENT NAME: Andrea Fam  MRN: [id number]  : 1950 AGE: 70 y.o. GENDER: male  DATE OF OPERATION: 2020  PREOPERATIVE DIAGNOSIS: Quadriceps Tendon Tear  POSTOPERATIVE DIAGNOSIS: Quadriceps Tendon Tear  OPERATION PERFORMED: Open Right Quadriceps Tendon Tear Repair  SURGEON: Bahman Ordonez MD  Circulator: Nathan Bean RN  Scrub Person: Jessica Pimentel Yamungu  ANESTHESIA: General  ASSISTANT: None   ESTIMATED BLOOD LOSS: 50cc  SPONGE AND NEEDLE COUNT: Correct  INDICATIONS: This patient was noted to have a quadriceps tendon tear. A discussion of operative versus nonoperative treatment was had with the patient. They elected to undergo open repair of the quadriceps tendon. The risks of surgery were discussed and included the risk of anesthesia, infection, damage to neurovascular structures, repair loosening/failure, the need for further procedures, medical complications, loss of range of motion, arthritis, and others. No guarantees were made. The patient wished to proceed with surgery and a surgical consent was signed.  COMPONENTS:   · Fiberwire #5    PERTINENT FINDINGS: Quadriceps Tendon Tear    DETAILS OF PROCEDURE:  The patient was met in the preoperative area. The site was marked. The consent and H&P were reviewed. The patient was then wheeled back to the operative suite and transferred to the operative table. The patient underwent anesthesia. A tourniquet was placed on the upper thigh. Excess hair along the incision was removed with clippers. Surgical alcohol was used to thoroughly clean the operative area.    The leg was then prepped and draped in the normal sterile fashion, which included ChloraPrep and multiple layers of sterile drapes. After a surgical timeout in which administration of preoperative antibiotics and the surgical site were confirmed, the tourniquet was put up.    A midline knee incision was  "utilized.  Dissection was carried down to the patella and retinaculum. The rent in the retinaculum was utilized to help exposed the patella and the quadriceps tendon. The knee was washed out of any hematoma. A clamp was then placed onto the quadriceps tendon to allow for retraction. The edges of the quadriceps tendon were cleaned up using a knife. The quadriceps stump on the proximal patella was cleaned off, and the bone was prepared to allow for good bony healing of the quadriceps tendon.    Two #5 FiberWire sutures were then sutured into the quadriceps tendon using a [place] locking technique.  2 drill holes were then placed onto the patella and the sutures were passed through this using a suture passer.  The suture was then tied.  I then used a third #5 FiberWire to close the retinaculum and oversew the tendon.    The knee wound was thoroughly irrigated. The soft tissues about the knee were injected with a local anesthetic.  The soft tissues were then closed in layers and a soft dressing and brace were applied.  The patient was awoken from anesthesia, moved to the [facility] and taken to the recovery room in stable condition. Sponge and needle count were correct. There were no complications. Patient tolerated the procedure well.    R \"Yariel\" Mery MILLER MD  Orthopaedic Surgery  [facility]  [phone]                  "

## 2020-08-04 NOTE — DISCHARGE INSTRUCTIONS
Patella ORIF & Patellar/Quad Tendon Repair Discharge Instructions  Dr. ELENA Juarez” Mery II  [phone]    • INCISION CARE  o Wash your hands prior to dressing changes  o The postoperative dressings should be changed starting on postoperative day #2. This will be started by nursing in the hospital if you have not yet been discharged. New dry dressings can then be changed daily. The knee brace must be removed for dressing changes.   o KEEP THE KNEE STRAIGHT DURING DRESSING CHANGES while the brace is off.   o No creams or ointments to the incisions until 4 weeks post op.  o May remove dressing once the incision is completely free of drainage. But need to wait at least 2 weeks after surgery.  o Do not touch or pick at the incision  o Check incision every day and notify surgeon immediately if any of the following signs or symptoms are seen:  - Increase in redness  - Increase in swelling around the incision and of the entire extremity  - Increase in pain  - NEW drainage or oozing from the incision  - Pulling apart of the edges of the incision  - Increase in overall body temperature (greater than 100.4 degrees)  o Your surgeon will instruct you regarding suture or staple removal. In general, this happens at 2-week post op. If you are discharged to an SNF/SNU facility, they can and should remove your staples at 14 days.    • ACTIVITIES  o Exercises:  - Physical therapy may not be needed immediately after surgery. Once bone/tendon healing has occurred, and weight bearing is permitted, it will be possible to start therapy to regain strength and mobility. If you are discharged to an SNF/SNU facility, they will work with you on safe ambulation.  - Elevate the affected leg most of the day during the first week post operatively.  - Use cold packs for 20-30 minutes approximately 5 times per day.  - Knee motion WILL NOT BE PERMITTED immediately following surgery. You will have a brace on that locks the knee in full extension and  does not allow bending.  - Knee bending along with physical therapy will begin after 3-6 weeks usually, depending on the injury. However sometimes up to 12 weeks is necessary for severe injuries.   o Activities of Daily Living:  - Showering may begin at 2 weeks if the wounds are completely dry and not draining. If there is any concern of wound healing, showering is not allowed. A new dry dressing can be applied after showering.  - No tub baths, hot tubs, or swimming pools for 4 weeks.    • Restrictions  o Weight: No weight on the leg until instructed to do so.  o Driving: Many patients have questions about when it is safe to return to driving. The answer is that this is extremely variable. It depends on the extent of the injury, as well as how quickly you heal. Certainly left leg injuries make returning to driving easier while right leg injuries require more extensive rehabilitation before driving can be safe. Until you can press down on the brake hard, and are off of all narcotics, driving is not permitted. Your surgeon cannot “clear” you to return to driving, only you can make the decision when you feel it is safe.    • Medications  o Anticoagulants: After upper extremity surgery most patients do not require an anticoagulant unless you have another injury that will be keeping you from mobilizing. Lower extremity surgery typically does require use of an anticoagulation medicine.   - IF YOU HAD LOWER EXTREMITY SURGERY AND ARE NOT DISCHARGED HOME WITH ANY ANTICOAGULANT MEDICINE YOU SHOULD TAKE ASPIRIN 325mg DAILY FOR 30 DAYS POSTOPERATIVELY.  - If you are discharged home with an anticoagulant such as Aspirin, Xarelto, Eliquis, Coumadin, or Lovenox, follow these simple instructions:   - Notify surgeon immediately if any [place] bleeding is noted in the urine, stool, emesis, or from the nose or the incision. Blood in the stool will often appear as black rather than red. Blood in urine may appear as pink. Blood in emesis  may appear as brown/black like coffee grounds.  - You will need to apply pressure for longer periods of time to any cuts or abrasions to stop bleeding  - Avoid alcohol while taking anticoagulants  - Most anticoagulants are to be taken for 30 days postoperatively. After this time, you may stop using them unless instructed otherwise.   - If you were already taking an anticoagulant (commonly Aspirin, Coumadin, or Plavix) you will likely be resuming your normal dose postoperatively and will be continuing that medication at the discretion of the prescribing physician.  o Stool Softeners: You will be at greater risk of constipation after surgery due to being less mobile and the pain medications.  - Take stool softeners as needed. Over the counter Colace 100 mg 1-2 capsules twice daily can be taken.  - If stools become too loose or too frequent, please decreases the dosage or stop the stool softener.  - If constipation occurs despite use of stool softeners, you are to continue the stool softeners and add a laxative (Milk of Magnesia 1 ounce daily as needed)  - Drink plenty of fluids, and eat fruits and vegetables during your recovery time. Getting up and mobilizing will help the bowels to recover their regular function, as will weaning off of all narcotics when the pain becomes tolerable.  o Pain Medications utilized after surgery are narcotics. This is some general information about these medications.  - CLASSIFICATION: Pain medications are called Opioids and are narcotics  - LEGALITIES: It is illegal to share narcotics with others  - DRIVING: it is illegal to drive while under the influence of narcotics. Doing so is a DUI.  - POTENTIAL SIDE EFFECTS: nausea, vomiting, itching, dizziness, drowsiness, dry mouth, constipation, and difficulty urinating.  - POTENTIAL ADVERSE EFFECTS:  - Opioid tolerance can develop with use of pain medications and this simply means that it requires more and more of the medication to control  pain. However, this is seen more in patients that use opioids for longer periods of time.  - Opioid dependence can develop with use of Opioids. People with opioid dependence will experience withdrawal symptoms upon cessation of the medication.  - Opioid addiction can develop with use of Opioids. The incidence of this is very unlikely in patients who take the medications as ordered and stop the medications as instructed.  - Opioid overdose can be dangerous, but is unlikely when the medication is taken as ordered and stopped when ordered. It is important not to mix opioids with alcohol as this can lead to over sedation and respiratory difficulty.  - DOSAGE:  - After the initial surgical pain begins to resolve, you may begin to decrease the pain medication. By the end of a few weeks, you should be off of pain medications.  - Refills will not be given by the office during evening hours, on weekends, or after 6 weeks post-op. You are responsible for weaning off of pain medication. You can increase the time between narcotic pills, taking one every 4 then 6 then 8 hours and so on.  - To seek refills on pain medications during the initial 6-week post-operative period, you must call the office to request the refill. The office will then notify you when to  the prescription. DO NOT wait until you are out of the medication to request a refill. Prescriptions will not be filled over the weekend and depending on the schedule, it may take a couple days for the prescription to be available. Someone will have to pick the prescription in person at the office.    • FOLLOW-UP VISITS  o You will need to follow up in the office with your surgeon in 2 weeks, or as instructed elsewhere in your discharge paperwork. Please call this number [phone] to schedule this appointment. If you are going to an SNF facility, they will arrange for you to follow up in the office.  o If you have any concerns or suspected complications prior to  your follow up visit, please call the office. Do not wait until your appointment time if you suspect complications. These will need to be addressed in the office promptly.      Bahman Ordonez II, MD  Orthopaedic Surgery  [facility]

## 2020-08-04 NOTE — ANESTHESIA PROCEDURE NOTES
Airway  Urgency: elective    Date/Time: 8/4/2020 7:00 AM  Airway not difficult    General Information and Staff    Patient location during procedure: OR  Anesthesiologist: Ginny Bhagat MD  CRNA: Marlin Antoine CRNA    Indications and Patient Condition  Indications for airway management: airway protection    Preoxygenated: yes  MILS not maintained throughout  Mask difficulty assessment: 1 - vent by mask    Final Airway Details  Final airway type: supraglottic airway      Successful airway: classic and LMA  Size 5    Number of attempts at approach: 1  Assessment: lips, teeth, and gum same as pre-op    Additional Comments  Inflated to seal.

## 2020-08-06 ENCOUNTER — TELEPHONE (OUTPATIENT)
Dept: INTERNAL MEDICINE | Facility: CLINIC | Age: 70
End: 2020-08-06

## 2020-08-06 NOTE — TELEPHONE ENCOUNTER
Spouse called in stating since the surgery the patient has been feeling nauseas having double vision and dizzy.    She would like to speak with the nurse.    Best call back # [phone]

## 2020-08-06 NOTE — TELEPHONE ENCOUNTER
I returned call to patient spouse. She reports her spouse has been having intermittent dizziness and blurred/double vision for about one month. It last maybe 30 minutes. He denies any chest pain/shortness of breath. He does have some nausea but that is related to recent hydrocodone for surgery. She states he had an episode earlier to day and it resolved after he ate and took nap. His blood pressure at that time was 135/79 and temperature 98.3. She did not take his blood sugar. I have recommend she monitor his blood sugar the next time it happens.she reports he is hydrating well. He had recent ECG 8/1/2020 with no acute findings and labs ok except glucose 64.  She will schedule appt with me sooner if it continues.

## 2020-08-06 NOTE — TELEPHONE ENCOUNTER
Patient had orthopedic surgery on 08/04/2020. Wife states the dizziness and vision issues have been going on since before surgery. Nausea is new onset, caused from hydrocodone pain meds. Patient does have zofran. Wife would like to know what to do Please advise

## 2020-08-14 ENCOUNTER — TELEPHONE (OUTPATIENT)
Dept: INTERNAL MEDICINE | Facility: CLINIC | Age: 70
End: 2020-08-14

## 2020-08-14 NOTE — TELEPHONE ENCOUNTER
pls let them know that he needs to be seen in ER if signif sx (may need CT or MRI of head).  I see from notes that this has been happening for several wks, but could be serious & needs evaluation (ER).

## 2020-08-14 NOTE — TELEPHONE ENCOUNTER
Update to Leana-Patient wife Lana called and she has been taking patients blood pressure and its between 143 and 147 over 82 for the last 2 days. Still experiencing dizziness and double vision. Please call back and advise.    Best call back [phone]

## 2020-08-14 NOTE — TELEPHONE ENCOUNTER
There is a note in the chart from Leana addressing this matter dated 8/6/2020.    Please further advise.    Thank you

## 2020-08-26 ENCOUNTER — OFFICE VISIT (OUTPATIENT)
Dept: SLEEP MEDICINE | Facility: HOSPITAL | Age: 70
End: 2020-08-26

## 2020-08-26 VITALS
DIASTOLIC BLOOD PRESSURE: 90 MMHG | HEART RATE: 91 BPM | OXYGEN SATURATION: 97 % | BODY MASS INDEX: 27.49 KG/M2 | SYSTOLIC BLOOD PRESSURE: 142 MMHG | HEIGHT: 70 IN | WEIGHT: 192 LBS

## 2020-08-26 DIAGNOSIS — G47.33 OSA (OBSTRUCTIVE SLEEP APNEA): Primary | ICD-10-CM

## 2020-08-26 PROCEDURE — G0463 HOSPITAL OUTPT CLINIC VISIT: HCPCS

## 2020-08-26 PROCEDURE — 99213 OFFICE O/P EST LOW 20 MIN: CPT | Performed by: INTERNAL MEDICINE

## 2020-08-26 NOTE — PROGRESS NOTES
"Follow Up Sleep Disorders Center Note     Chief Complaint:  LISANDRA     Primary Care Physician: Suresh Rolon Jr., MD    Interval History:   The patient is a 70 y.o. male I last saw the patient in May 2019.  Presently he is stable without new complaints.  He goes to bed at 11:30 PM and awakens at 9 AM.  [place] Sleepiness Scale is normal at 5.    In July, the patient had a urinary tract infection and he states he became septic.  He is still recovering.    Review of Systems:    A complete review of systems was done and all were negative with the exception of the above    Social History:    Social History     Socioeconomic History   • Marital status:      Spouse name: Not on file   • Number of children: Not on file   • Years of education: Not on file   • Highest education level: Not on file   Tobacco Use   • Smoking status: Former Smoker     Packs/day: 1.50     Years: 30.00     Pack years: 45.00     Types: Cigarettes     Last attempt to quit:      Years since quittin.6   • Smokeless tobacco: Never Used   • Tobacco comment: QUIT AGE 48   Substance and Sexual Activity   • Alcohol use: Yes     Comment: 2  drinks per  month   • Drug use: No   • Sexual activity: Defer       Allergies:  Lisinopril     Medication Review: His list was reviewed.      Vital Signs:    Vitals:    20 1138   BP: 142/90   Pulse: 91   SpO2: 97%   Weight: 87.1 kg (192 lb)   Height: 177.8 cm (70\")     Body mass index is 27.55 kg/m².    Physical Exam:    Constitutional:  Well developed 70 y.o. male that appears in no apparent distress.  Awake & oriented times 3.  Normal mood with normal recent and remote memory and normal judgement.  Eyes:  Conjunctivae normal.  Oropharynx: Previously, moist mucous membranes without exudate and a large tongue and normal uvula that is long and tapered and mild-moderate narrowing of the posterior pharyngeal opening and class II-3 Mallampati airway, patient is wearing a facemask.     Results Review:  DME " is [facility] and he uses a nasal mask.  Downloads between 5/27 and 8/24/2020 compliance 99%.  Average usage is 8 hours and 20 minutes.  Average AHI is normal without leak.  Average AutoCPAP pressure is 7.8 and his auto CPAP is 5-15.       Impression:   Obstructive sleep apnea adequately treated with auto CPAP with good compliance and usage and no complaints of hypersomnolence.    Plan:  Good sleep hygiene measures should be maintained.  Some weight loss would be beneficial in this patient who is overweight by BMI.  The patient is benefiting from the treatment being provided.     The patient will continue auto CPAP and a new prescription will be sent to his DME.  The patient has complaints with his nasal interface.  He was shown a medium N20 which he would like to try.    The patient will call for any problems and will follow up in 1 year.      John Mcguire MD  Sleep Medicine  08/26/20  11:43

## 2020-09-08 ENCOUNTER — TELEPHONE (OUTPATIENT)
Dept: ENDOCRINOLOGY | Age: 70
End: 2020-09-08

## 2020-09-08 NOTE — TELEPHONE ENCOUNTER
Patient wife called stating that he is having dizziness. Thinking if oxcarbazepine 600 mg tablet  Want to stop the oxcarbazepine and try gabapentin since there insurance will cover it now

## 2020-09-09 DIAGNOSIS — E11.42 TYPE 2 DIABETES MELLITUS WITH PERIPHERAL NEUROPATHY (HCC): Primary | ICD-10-CM

## 2020-09-09 RX ORDER — GABAPENTIN 300 MG/1
300 CAPSULE ORAL 2 TIMES DAILY
Qty: 60 CAPSULE | Refills: 0 | Status: SHIPPED | OUTPATIENT
Start: 2020-09-09 | End: 2020-09-16 | Stop reason: SDUPTHER

## 2020-09-10 ENCOUNTER — TELEPHONE (OUTPATIENT)
Dept: ENDOCRINOLOGY | Age: 70
End: 2020-09-10

## 2020-09-10 NOTE — TELEPHONE ENCOUNTER
Spoke with patient wife to let her know that since Dr Henao was out of the office I had another provider look at the message  He sent in gabapentin 300 mg 1 tab twice daily

## 2020-09-16 DIAGNOSIS — E11.42 TYPE 2 DIABETES MELLITUS WITH PERIPHERAL NEUROPATHY (HCC): ICD-10-CM

## 2020-09-17 RX ORDER — GABAPENTIN 300 MG/1
600 CAPSULE ORAL 2 TIMES DAILY
Qty: 360 CAPSULE | Refills: 0 | Status: SHIPPED | OUTPATIENT
Start: 2020-09-17 | End: 2021-01-04 | Stop reason: SDUPTHER

## 2020-09-25 ENCOUNTER — TELEPHONE (OUTPATIENT)
Dept: ENDOCRINOLOGY | Age: 70
End: 2020-09-25

## 2020-09-25 NOTE — TELEPHONE ENCOUNTER
SPOKE WITH PATIENT TO LET HIM KNOW THAT I HAVE CALLED HIS PHARMACY AND GAVE VERBAL OK PER DR GARNER TO GO A HEAD AND FILL THE GABAPENTIN 300 MG 2 TAB BID AND I WILL PLACE PATIENT ON WAIT LIST TO GET HIM IN SOONER PATIENT TORRI UNDERSTANDING

## 2020-09-29 RX ORDER — LEVOTHYROXINE SODIUM 0.05 MG/1
TABLET ORAL
Qty: 90 TABLET | Refills: 3 | Status: SHIPPED | OUTPATIENT
Start: 2020-09-29 | End: 2021-12-01

## 2020-10-17 ENCOUNTER — RESULTS ENCOUNTER (OUTPATIENT)
Dept: ENDOCRINOLOGY | Age: 70
End: 2020-10-17

## 2020-10-17 DIAGNOSIS — Z79.4 LONG-TERM INSULIN USE (HCC): ICD-10-CM

## 2020-10-17 DIAGNOSIS — E78.2 MIXED HYPERLIPIDEMIA: ICD-10-CM

## 2020-10-17 DIAGNOSIS — IMO0002 DM (DIABETES MELLITUS), TYPE 2, UNCONTROLLED W/NEUROLOGIC COMPLICATION: ICD-10-CM

## 2020-10-17 DIAGNOSIS — E03.9 ACQUIRED HYPOTHYROIDISM: ICD-10-CM

## 2020-10-20 ENCOUNTER — OFFICE VISIT (OUTPATIENT)
Dept: ENDOCRINOLOGY | Age: 70
End: 2020-10-20

## 2020-10-20 VITALS — WEIGHT: 198.2 LBS | BODY MASS INDEX: 28.44 KG/M2 | SYSTOLIC BLOOD PRESSURE: 128 MMHG | DIASTOLIC BLOOD PRESSURE: 74 MMHG

## 2020-10-20 DIAGNOSIS — Z79.4 TYPE 2 DIABETES MELLITUS WITHOUT COMPLICATION, WITH LONG-TERM CURRENT USE OF INSULIN (HCC): Primary | ICD-10-CM

## 2020-10-20 DIAGNOSIS — E03.8 SECONDARY HYPOTHYROIDISM: ICD-10-CM

## 2020-10-20 DIAGNOSIS — E11.9 TYPE 2 DIABETES MELLITUS WITHOUT COMPLICATION, WITH LONG-TERM CURRENT USE OF INSULIN (HCC): Primary | ICD-10-CM

## 2020-10-20 DIAGNOSIS — E78.2 MIXED HYPERLIPIDEMIA: ICD-10-CM

## 2020-10-20 PROCEDURE — 99214 OFFICE O/P EST MOD 30 MIN: CPT | Performed by: NURSE PRACTITIONER

## 2020-10-21 LAB
ALBUMIN SERPL-MCNC: 4.5 G/DL (ref 3.5–5.2)
ALBUMIN/GLOB SERPL: 2.3 G/DL
ALP SERPL-CCNC: 61 U/L (ref 39–117)
ALT SERPL-CCNC: 18 U/L (ref 1–41)
AST SERPL-CCNC: 18 U/L (ref 1–40)
BILIRUB SERPL-MCNC: 0.3 MG/DL (ref 0–1.2)
BUN SERPL-MCNC: 10 MG/DL (ref 8–23)
BUN/CREAT SERPL: 12.2 (ref 7–25)
CALCIUM SERPL-MCNC: 9.9 MG/DL (ref 8.6–10.5)
CHLORIDE SERPL-SCNC: 101 MMOL/L (ref 98–107)
CHOLEST SERPL-MCNC: 118 MG/DL (ref 0–200)
CO2 SERPL-SCNC: 27.7 MMOL/L (ref 22–29)
CREAT SERPL-MCNC: 0.82 MG/DL (ref 0.76–1.27)
GLOBULIN SER CALC-MCNC: 2 GM/DL
GLUCOSE SERPL-MCNC: 109 MG/DL (ref 65–99)
HBA1C MFR BLD: 5.7 % (ref 4.8–5.6)
HDLC SERPL-MCNC: 45 MG/DL (ref 40–60)
INTERPRETATION: NORMAL
LDLC SERPL CALC-MCNC: 54 MG/DL (ref 0–100)
Lab: NORMAL
POTASSIUM SERPL-SCNC: 4.8 MMOL/L (ref 3.5–5.2)
PROT SERPL-MCNC: 6.5 G/DL (ref 6–8.5)
SODIUM SERPL-SCNC: 140 MMOL/L (ref 136–145)
T4 FREE SERPL-MCNC: 1.13 NG/DL (ref 0.93–1.7)
TRIGL SERPL-MCNC: 104 MG/DL (ref 0–150)
TSH SERPL DL<=0.005 MIU/L-ACNC: 1.19 UIU/ML (ref 0.27–4.2)
UNABLE TO VOID: NORMAL
VLDLC SERPL CALC-MCNC: 19 MG/DL (ref 5–40)

## 2020-10-22 RX ORDER — SITAGLIPTIN 100 MG/1
TABLET, FILM COATED ORAL
Qty: 90 TABLET | Refills: 1 | Status: SHIPPED | OUTPATIENT
Start: 2020-10-22 | End: 2020-10-22 | Stop reason: SDUPTHER

## 2020-10-25 ENCOUNTER — RESULTS ENCOUNTER (OUTPATIENT)
Dept: ENDOCRINOLOGY | Age: 70
End: 2020-10-25

## 2020-10-25 DIAGNOSIS — E03.8 SECONDARY HYPOTHYROIDISM: ICD-10-CM

## 2020-10-25 DIAGNOSIS — E11.9 TYPE 2 DIABETES MELLITUS WITHOUT COMPLICATION, WITH LONG-TERM CURRENT USE OF INSULIN (HCC): ICD-10-CM

## 2020-10-25 DIAGNOSIS — Z79.4 TYPE 2 DIABETES MELLITUS WITHOUT COMPLICATION, WITH LONG-TERM CURRENT USE OF INSULIN (HCC): ICD-10-CM

## 2020-11-03 ENCOUNTER — OFFICE VISIT (OUTPATIENT)
Dept: INTERNAL MEDICINE | Facility: CLINIC | Age: 70
End: 2020-11-03

## 2020-11-03 VITALS
HEIGHT: 69 IN | DIASTOLIC BLOOD PRESSURE: 80 MMHG | RESPIRATION RATE: 15 BRPM | BODY MASS INDEX: 28.88 KG/M2 | OXYGEN SATURATION: 98 % | SYSTOLIC BLOOD PRESSURE: 140 MMHG | WEIGHT: 195 LBS | TEMPERATURE: 98 F | HEART RATE: 81 BPM

## 2020-11-03 DIAGNOSIS — E11.40 TYPE 2 DIABETES MELLITUS WITH DIABETIC NEUROPATHY, UNSPECIFIED WHETHER LONG TERM INSULIN USE (HCC): ICD-10-CM

## 2020-11-03 DIAGNOSIS — N30.00 ACUTE CYSTITIS WITHOUT HEMATURIA: Primary | ICD-10-CM

## 2020-11-03 DIAGNOSIS — E03.9 ACQUIRED HYPOTHYROIDISM: ICD-10-CM

## 2020-11-03 DIAGNOSIS — I10 ESSENTIAL HYPERTENSION: ICD-10-CM

## 2020-11-03 DIAGNOSIS — G62.89 OTHER POLYNEUROPATHY: ICD-10-CM

## 2020-11-03 LAB
BILIRUB UR QL STRIP: NEGATIVE
CLARITY UR: CLEAR
COLOR UR: YELLOW
GLUCOSE UR STRIP-MCNC: NEGATIVE MG/DL
HGB UR QL STRIP.AUTO: NEGATIVE
KETONES UR QL STRIP: ABNORMAL
LEUKOCYTE ESTERASE UR QL STRIP.AUTO: NEGATIVE
NITRITE UR QL STRIP: NEGATIVE
PH UR STRIP.AUTO: <=5 [PH] (ref 5–8)
PROT UR QL STRIP: NEGATIVE
SP GR UR STRIP: >=1.03 (ref 1–1.03)
UROBILINOGEN UR QL STRIP: ABNORMAL

## 2020-11-03 PROCEDURE — 81003 URINALYSIS AUTO W/O SCOPE: CPT | Performed by: FAMILY MEDICINE

## 2020-11-03 PROCEDURE — 99213 OFFICE O/P EST LOW 20 MIN: CPT | Performed by: FAMILY MEDICINE

## 2020-11-03 NOTE — PROGRESS NOTES
Subjective   Andrea Fam is a 70 y.o. male.     Chief Complaint   Patient presents with   • Diabetes Mellitus     2 year follow-up   • Hyperlipidemia   • Hypothyroidism         History of Present Illness   Delightful gentleman who unfortunately had urinary sepsis this summer also had a torn quadriceps repaired.  He is seeing endocrinology for diabetic treatment and management and also active management of hypothyroidism and hyperlipidemia these were reviewed we will have him come back for Medicare wellness in about 6 months.    He has progressive diabetic neuropathy released neuropathy that is progressive involving fine touch of the hands as well as the forearms and from the knees down to the feet.  We discussed getting a urinary test today and culture and have him follow-up with a neurologist for opinions on treatment of diabetic neuropathy and also discussed supplements as unproven therapy for diabetic neuropathy.      The following portions of the patient's history were reviewed and updated as appropriate: allergies, current medications, past social history and problem list.    Review of Systems   Constitutional: Negative.    HENT: Negative.    Eyes: Negative.    Respiratory: Negative.    Cardiovascular: Negative.    Gastrointestinal: Negative.    Endocrine: Negative.    Genitourinary: Negative.    Musculoskeletal: Negative.    Skin: Negative.    Allergic/Immunologic: Negative.    Neurological: Positive for numbness.   Hematological: Negative.    Psychiatric/Behavioral: Negative.        Objective   Vitals:    11/03/20 1120   BP: 140/80   Pulse: 81   Resp: 15   Temp: 98 °F (36.7 °C)   SpO2: 98%     Physical Exam  Vitals signs reviewed.   Constitutional:       Appearance: He is well-developed.   HENT:      Head: Normocephalic and atraumatic.      Right Ear: Tympanic membrane and external ear normal.      Left Ear: Tympanic membrane and external ear normal.   Eyes:      Conjunctiva/sclera: Conjunctivae normal.       Pupils: Pupils are equal, round, and reactive to light.   Neck:      Musculoskeletal: Normal range of motion and neck supple.      Thyroid: No thyromegaly.      Vascular: No JVD.   Cardiovascular:      Rate and Rhythm: Normal rate and regular rhythm.      Heart sounds: Normal heart sounds.   Pulmonary:      Effort: Pulmonary effort is normal.      Breath sounds: Normal breath sounds.   Abdominal:      General: Bowel sounds are normal.      Palpations: Abdomen is soft.   Musculoskeletal: Normal range of motion.   Lymphadenopathy:      Cervical: No cervical adenopathy.   Skin:     General: Skin is warm and dry.      Findings: No rash.   Neurological:      Mental Status: He is alert and oriented to person, place, and time.      Cranial Nerves: No cranial nerve deficit.      Coordination: Coordination normal.      Comments: Decreased sensation to light touch involving hands as well as lower extremities below the knees.   Psychiatric:         Behavior: Behavior normal.         Thought Content: Thought content normal.         Judgment: Judgment normal.         Assessment/Plan   Problems Addressed this Visit        Cardiovascular and Mediastinum    HTN (hypertension)       Endocrine    Type 2 diabetes mellitus (CMS/formerly [facility])    Relevant Orders    Ambulatory Referral to Neurology    Hypothyroidism      Other Visit Diagnoses     Acute cystitis without hematuria    -  Primary    Relevant Orders    Urinalysis With Culture If Indicated -    Other polyneuropathy        Relevant Orders    Ambulatory Referral to Neurology      Diagnoses       Codes Comments    Acute cystitis without hematuria    -  Primary ICD-10-CM: N30.00  ICD-9-CM: 595.0     Essential hypertension     ICD-10-CM: I10  ICD-9-CM: 401.9     Acquired hypothyroidism     ICD-10-CM: E03.9  ICD-9-CM: 244.9     Type 2 diabetes mellitus with diabetic neuropathy, unspecified whether long term insulin use (CMS/formerly [facility])     ICD-10-CM: E11.40  ICD-9-CM: 250.60, 357.2     Other  polyneuropathy     ICD-10-CM: G62.89  ICD-9-CM: 357.89       Ankle on urine with culture plus referral to neurology for progressive neuropathy presumed diabetic.  Medicare wellness in 6 months.

## 2021-01-04 DIAGNOSIS — E11.42 TYPE 2 DIABETES MELLITUS WITH PERIPHERAL NEUROPATHY (HCC): ICD-10-CM

## 2021-01-04 RX ORDER — GABAPENTIN 300 MG/1
CAPSULE ORAL
Qty: 540 CAPSULE | Refills: 0 | Status: SHIPPED | OUTPATIENT
Start: 2021-01-04 | End: 2021-01-12 | Stop reason: SDUPTHER

## 2021-01-12 DIAGNOSIS — E11.42 TYPE 2 DIABETES MELLITUS WITH PERIPHERAL NEUROPATHY (HCC): ICD-10-CM

## 2021-01-12 RX ORDER — GABAPENTIN 300 MG/1
CAPSULE ORAL
Qty: 42 CAPSULE | Refills: 0 | Status: SHIPPED | OUTPATIENT
Start: 2021-01-12 | End: 2021-03-25

## 2021-02-25 RX ORDER — ATORVASTATIN CALCIUM 20 MG/1
TABLET, FILM COATED ORAL
Qty: 90 TABLET | Refills: 3 | Status: SHIPPED | OUTPATIENT
Start: 2021-02-25 | End: 2022-01-18

## 2021-03-09 DIAGNOSIS — Z23 IMMUNIZATION DUE: ICD-10-CM

## 2021-03-23 DIAGNOSIS — E11.42 TYPE 2 DIABETES MELLITUS WITH PERIPHERAL NEUROPATHY (HCC): ICD-10-CM

## 2021-03-25 RX ORDER — GABAPENTIN 300 MG/1
CAPSULE ORAL
Qty: 540 CAPSULE | Refills: 0 | Status: SHIPPED | OUTPATIENT
Start: 2021-03-25 | End: 2021-07-14

## 2021-04-01 ENCOUNTER — IMMUNIZATION (OUTPATIENT)
Dept: VACCINE CLINIC | Facility: HOSPITAL | Age: 71
End: 2021-04-01

## 2021-04-01 DIAGNOSIS — Z23 IMMUNIZATION DUE: ICD-10-CM

## 2021-04-01 PROCEDURE — 91300 HC SARSCOV02 VAC 30MCG/0.3ML IM: CPT | Performed by: INTERNAL MEDICINE

## 2021-04-01 PROCEDURE — 0001A: CPT | Performed by: INTERNAL MEDICINE

## 2021-04-06 DIAGNOSIS — E03.9 ACQUIRED HYPOTHYROIDISM: ICD-10-CM

## 2021-04-06 DIAGNOSIS — E55.9 VITAMIN D DEFICIENCY: ICD-10-CM

## 2021-04-06 DIAGNOSIS — E29.1 HYPOGONADISM IN MALE: ICD-10-CM

## 2021-04-06 DIAGNOSIS — E11.42 TYPE 2 DIABETES MELLITUS WITH DIABETIC POLYNEUROPATHY, WITH LONG-TERM CURRENT USE OF INSULIN (HCC): Primary | ICD-10-CM

## 2021-04-06 DIAGNOSIS — E78.2 MIXED HYPERLIPIDEMIA: ICD-10-CM

## 2021-04-06 DIAGNOSIS — I10 ESSENTIAL HYPERTENSION: ICD-10-CM

## 2021-04-06 DIAGNOSIS — Z79.4 TYPE 2 DIABETES MELLITUS WITH DIABETIC POLYNEUROPATHY, WITH LONG-TERM CURRENT USE OF INSULIN (HCC): Primary | ICD-10-CM

## 2021-04-16 LAB
25(OH)D3+25(OH)D2 SERPL-MCNC: 82.4 NG/ML (ref 30–100)
ALBUMIN SERPL-MCNC: 4.5 G/DL (ref 3.5–5.2)
ALBUMIN/GLOB SERPL: 2 G/DL
ALP SERPL-CCNC: 65 U/L (ref 39–117)
ALT SERPL-CCNC: 30 U/L (ref 1–41)
AST SERPL-CCNC: 29 U/L (ref 1–40)
BILIRUB SERPL-MCNC: 0.5 MG/DL (ref 0–1.2)
BUN SERPL-MCNC: 14 MG/DL (ref 8–23)
BUN/CREAT SERPL: 14.3 (ref 7–25)
C PEPTIDE SERPL-MCNC: 2.2 NG/ML (ref 1.1–4.4)
CALCIUM SERPL-MCNC: 10.3 MG/DL (ref 8.6–10.5)
CHLORIDE SERPL-SCNC: 101 MMOL/L (ref 98–107)
CHOLEST SERPL-MCNC: 100 MG/DL (ref 0–200)
CO2 SERPL-SCNC: 26.4 MMOL/L (ref 22–29)
CREAT SERPL-MCNC: 0.98 MG/DL (ref 0.76–1.27)
GLOBULIN SER CALC-MCNC: 2.2 GM/DL
GLUCOSE SERPL-MCNC: 135 MG/DL (ref 65–99)
HBA1C MFR BLD: 7.2 % (ref 4.8–5.6)
HCT VFR BLD AUTO: 38.6 % (ref 37.5–51)
HDLC SERPL-MCNC: 31 MG/DL (ref 40–60)
HGB BLD-MCNC: 12.9 G/DL (ref 13–17.7)
IMP & REVIEW OF LAB RESULTS: NORMAL
LDLC SERPL CALC-MCNC: 45 MG/DL (ref 0–100)
POTASSIUM SERPL-SCNC: 4.8 MMOL/L (ref 3.5–5.2)
PROT SERPL-MCNC: 6.7 G/DL (ref 6–8.5)
SODIUM SERPL-SCNC: 139 MMOL/L (ref 136–145)
T3FREE SERPL-MCNC: 3.3 PG/ML (ref 2–4.4)
T4 FREE SERPL-MCNC: 1.35 NG/DL (ref 0.93–1.7)
TESTOST FREE SERPL-MCNC: 7 PG/ML (ref 6.6–18.1)
TESTOST SERPL-MCNC: 118 NG/DL (ref 264–916)
TRIGL SERPL-MCNC: 137 MG/DL (ref 0–150)
TSH SERPL DL<=0.005 MIU/L-ACNC: 1.07 UIU/ML (ref 0.27–4.2)
VLDLC SERPL CALC-MCNC: 24 MG/DL (ref 5–40)

## 2021-04-20 ENCOUNTER — OFFICE VISIT (OUTPATIENT)
Dept: ENDOCRINOLOGY | Age: 71
End: 2021-04-20

## 2021-04-20 VITALS
HEIGHT: 69 IN | OXYGEN SATURATION: 98 % | DIASTOLIC BLOOD PRESSURE: 80 MMHG | SYSTOLIC BLOOD PRESSURE: 132 MMHG | BODY MASS INDEX: 29.62 KG/M2 | HEART RATE: 76 BPM | WEIGHT: 200 LBS

## 2021-04-20 DIAGNOSIS — E78.2 HYPERLIPEMIA, MIXED: ICD-10-CM

## 2021-04-20 DIAGNOSIS — IMO0002 DM (DIABETES MELLITUS), TYPE 2, UNCONTROLLED W/NEUROLOGIC COMPLICATION: Primary | ICD-10-CM

## 2021-04-20 DIAGNOSIS — E11.42 DIABETIC POLYNEUROPATHY ASSOCIATED WITH TYPE 2 DIABETES MELLITUS (HCC): ICD-10-CM

## 2021-04-20 DIAGNOSIS — E03.9 ACQUIRED HYPOTHYROIDISM: ICD-10-CM

## 2021-04-20 PROCEDURE — 99214 OFFICE O/P EST MOD 30 MIN: CPT | Performed by: INTERNAL MEDICINE

## 2021-04-20 RX ORDER — DAPAGLIFLOZIN 10 MG/1
10 TABLET, FILM COATED ORAL DAILY
Qty: 30 TABLET | Refills: 11 | Status: SHIPPED | OUTPATIENT
Start: 2021-04-20 | End: 2022-05-05

## 2021-04-20 NOTE — PROGRESS NOTES
"Chief Complaint  Chief Complaint   Patient presents with   • Diabetes     testing bs 1 x week / last dm eye exam 2/17/21   • Hypertension   • Hypothyroidism   • Hypogonadism       Subjective          History of Present Illness    Andrea Fam 71 y.o. presents with Type 2 dm as a F/u patient.     Type 2 dm - Diagnosed about 20+ years ago.   Today in clinic pt reports being on metformin - 2 tabs -  500 mg po bid, ozempic once a week, Toujeo 28 units daily.   FBG - 100 - 160  Pre meals - 100  - 160  Checks BG - 1 x week  Sensor - x  Dm retinopathy - x,Last eye exam - 2/2021  Dm nephropathy - no  Dm neuropathy - yes,Dm neuropathy meds - gabapentin  CAD -x  CVA -x  Episodes of hypoglycemia - x  Pt is physically active. weight has been stable.   Pt tries to follow DM diet for most part.     Hyperlipidemia  On Lipitor 20 mg oral daily, fish oil 2 times daily.      Sleep apnea  Compliant with her CPAP machine.     Hypothyroidism  On levothyroxine 50 mcg oral daily.      Reviewed primary care physician's/consulting physician documentation and lab results         I have reviewed the patient's allergies, medicines, past medical hx, family hx and social hx in detail.    Objective   Vital Signs:   /80 (BP Location: Right arm, Patient Position: Sitting, Cuff Size: Large Adult)   Pulse 76   Ht 174.6 cm (68.74\")   Wt 90.7 kg (200 lb)   SpO2 98%   BMI 29.76 kg/m²   Physical Exam   General appearance - no distress  Eyes- anicteric sclera  Ear nose and throat-external ears and nose normal.    Respiratory-normal chest on inspection.  No respiratory distress noted.  Skin-no rashes.  Neuro-alert and oriented x3            Result Review :   The following data was reviewed by: Asya Henao MD on 04/20/2021:  Orders Only on 04/13/2021   Component Date Value Ref Range Status   • Glucose 04/13/2021 135* 65 - 99 mg/dL Final   • BUN 04/13/2021 14  8 - 23 mg/dL Final   • Creatinine 04/13/2021 0.98  0.76 - 1.27 mg/dL Final   • eGFR " Non  Am 04/13/2021 75  >60 mL/min/1.73 Final    Comment: The MDRD GFR formula is only valid for adults with stable  renal function between ages 18 and 70.     • eGFR  Am 04/13/2021 91  >60 mL/min/1.73 Final   • BUN/Creatinine Ratio 04/13/2021 14.3  7.0 - 25.0 Final   • Sodium 04/13/2021 139  136 - 145 mmol/L Final   • Potassium 04/13/2021 4.8  3.5 - 5.2 mmol/L Final   • Chloride 04/13/2021 101  98 - 107 mmol/L Final   • Total CO2 04/13/2021 26.4  22.0 - 29.0 mmol/L Final   • Calcium 04/13/2021 10.3  8.6 - 10.5 mg/dL Final   • Total Protein 04/13/2021 6.7  6.0 - 8.5 g/dL Final   • Albumin 04/13/2021 4.50  3.50 - 5.20 g/dL Final   • Globulin 04/13/2021 2.2  gm/dL Final   • A/G Ratio 04/13/2021 2.0  g/dL Final   • Total Bilirubin 04/13/2021 0.5  0.0 - 1.2 mg/dL Final   • Alkaline Phosphatase 04/13/2021 65  39 - 117 U/L Final   • AST (SGOT) 04/13/2021 29  1 - 40 U/L Final   • ALT (SGPT) 04/13/2021 30  1 - 41 U/L Final   • Total Cholesterol 04/13/2021 100  0 - 200 mg/dL Final    Comment: Cholesterol Reference Ranges  (U.S. Department of Health and Human Services ATP III  Classifications)  Desirable          <200 mg/dL  Borderline High    200-239 mg/dL  High Risk          >240 mg/dL  Triglyceride Reference Ranges  (U.S. Department of Health and Human Services ATP III  Classifications)  Normal           <150 mg/dL  Borderline High  150-199 mg/dL  High             200-499 mg/dL  Very High        >500 mg/dL  HDL Reference Ranges  (U.S. Department of Health and Human Services ATP III  Classifcations)  Low     <40 mg/dl (major risk factor for CHD)  High    >60 mg/dl ('negative' risk factor for CHD)  LDL Reference Ranges  (U.S. Department of Health and Human Services ATP III  Classifcations)  Optimal          <100 mg/dL  Near Optimal     100-129 mg/dL  Borderline High  130-159 mg/dL  High             160-189 mg/dL  Very High        >189 mg/dL     • Triglycerides 04/13/2021 137  0 - 150 mg/dL Final   • HDL  Cholesterol 04/13/2021 31* 40 - 60 mg/dL Final   • VLDL Cholesterol Saqib 04/13/2021 24  5 - 40 mg/dL Final   • LDL Chol Calc (NIH) 04/13/2021 45  0 - 100 mg/dL Final   • Hemoglobin 04/13/2021 12.9* 13.0 - 17.7 g/dL Final   • Hematocrit 04/13/2021 38.6  37.5 - 51.0 % Final   • Testosterone, Total 04/13/2021 118* 264 - 916 ng/dL Final    Comment: Adult male reference interval is based on a population of  healthy nonobese males (BMI <30) between 19 and 39 years old.  Jimmy, et.al. JCEM 2017,102;6551-5622. PMID: [id number].     • Testosterone, Free 04/13/2021 7.0  6.6 - 18.1 pg/mL Final   • Hemoglobin A1C 04/13/2021 7.20* 4.80 - 5.60 % Final    Comment: Hemoglobin A1C Ranges:  Increased Risk for Diabetes  5.7% to 6.4%  Diabetes                     >= 6.5%  Diabetic Goal                < 7.0%     • Free T4 04/13/2021 1.35  0.93 - 1.70 ng/dL Final    Results may be falsely increased if patient taking Biotin.   • TSH 04/13/2021 1.070  0.270 - 4.200 uIU/mL Final   • C-Peptide 04/13/2021 2.2  1.1 - 4.4 ng/mL Final    C-Peptide reference interval is for fasting patients.   • 25 Hydroxy, Vitamin D 04/13/2021 82.4  30.0 - 100.0 ng/ml Final    Comment: Results may be falsely increased if patient taking Biotin.  Reference Range for Total Vitamin D 25(OH)  Deficiency <20.0 ng/mL  Insufficiency 21-29 ng/mL  Sufficiency  ng/mL  Toxicity >100 ng/ml     • T3, Free 04/13/2021 3.3  2.0 - 4.4 pg/mL Final   • Interpretation 04/13/2021 Note   Final    Supplemental report is available.     Data reviewed: PCP documentation       Results Review:    I reviewed the patient's new clinical results.     Assessment and Plan    Problem List Items Addressed This Visit        Other    Hypothyroidism    Relevant Orders    Basic Metabolic Panel    Hemoglobin A1c    Lipid Panel    Microalbumin / Creatinine Urine Ratio - Urine, Clean Catch    TSH    T4, Free      Other Visit Diagnoses     DM (diabetes mellitus), type 2, uncontrolled w/neurologic  "complication (CMS/HCC)    -  Primary    Relevant Medications    Dapagliflozin Propanediol (Farxiga) 10 MG tablet    Other Relevant Orders    Basic Metabolic Panel    Hemoglobin A1c    Lipid Panel    Microalbumin / Creatinine Urine Ratio - Urine, Clean Catch    TSH    T4, Free    Diabetic polyneuropathy associated with type 2 diabetes mellitus (CMS/HCC)        Relevant Medications    Dapagliflozin Propanediol (Farxiga) 10 MG tablet    Other Relevant Orders    Basic Metabolic Panel    Hemoglobin A1c    Lipid Panel    Microalbumin / Creatinine Urine Ratio - Urine, Clean Catch    TSH    T4, Free    Hyperlipemia, mixed        Relevant Orders    Basic Metabolic Panel    Hemoglobin A1c    Lipid Panel    Microalbumin / Creatinine Urine Ratio - Urine, Clean Catch    TSH    T4, Free        Type 2 dm - uncontrolled with hyperglycemia   Add Farxiga 10 mg po daily.   Continue Toujeo 28 units daily.   Continue ozempic 1 mg subq once a week.     Hypothyroidism    Continue levothyroxine 50 mcg oral daily.    Diabetic neuropathy-worse  Continue Neurontin  Refer the patient to podiatry.    Interpreted the blood work-up/imaging results performed by the primary care/consulting physician -    Refills sent to pharmacy    Follow Up     Patient was given instructions and counseling regarding her condition or for health maintenance advice. Please see specific information pulled into the AVS if appropriate.       Thank you for asking me to see your patient, Andrea Fam in consultation.         Asya Henao MD  04/20/21      EMR Dragon / transcription disclaimer:     \"Dictated utilizing Dragon dictation\".         "

## 2021-04-22 ENCOUNTER — IMMUNIZATION (OUTPATIENT)
Dept: VACCINE CLINIC | Facility: HOSPITAL | Age: 71
End: 2021-04-22

## 2021-04-22 PROCEDURE — 0002A: CPT | Performed by: INTERNAL MEDICINE

## 2021-04-22 PROCEDURE — 91300 HC SARSCOV02 VAC 30MCG/0.3ML IM: CPT | Performed by: INTERNAL MEDICINE

## 2021-04-26 RX ORDER — INSULIN GLARGINE 300 U/ML
INJECTION, SOLUTION SUBCUTANEOUS
Qty: 13.5 ML | Refills: 4 | Status: SHIPPED | OUTPATIENT
Start: 2021-04-26 | End: 2022-05-04

## 2021-05-03 ENCOUNTER — TELEPHONE (OUTPATIENT)
Dept: ORTHOPEDIC SURGERY | Facility: CLINIC | Age: 71
End: 2021-05-03

## 2021-05-03 ENCOUNTER — OFFICE VISIT (OUTPATIENT)
Dept: INTERNAL MEDICINE | Facility: CLINIC | Age: 71
End: 2021-05-03

## 2021-05-03 VITALS
OXYGEN SATURATION: 98 % | WEIGHT: 197 LBS | SYSTOLIC BLOOD PRESSURE: 128 MMHG | HEIGHT: 69 IN | DIASTOLIC BLOOD PRESSURE: 80 MMHG | HEART RATE: 69 BPM | BODY MASS INDEX: 29.18 KG/M2 | TEMPERATURE: 99.3 F

## 2021-05-03 DIAGNOSIS — Z00.00 MEDICARE ANNUAL WELLNESS VISIT, INITIAL: ICD-10-CM

## 2021-05-03 DIAGNOSIS — E03.9 ACQUIRED HYPOTHYROIDISM: ICD-10-CM

## 2021-05-03 DIAGNOSIS — G62.89 OTHER POLYNEUROPATHY: Primary | ICD-10-CM

## 2021-05-03 DIAGNOSIS — Z79.4 TYPE 2 DIABETES MELLITUS WITH DIABETIC POLYNEUROPATHY, WITH LONG-TERM CURRENT USE OF INSULIN (HCC): ICD-10-CM

## 2021-05-03 DIAGNOSIS — E78.2 MIXED HYPERLIPIDEMIA: ICD-10-CM

## 2021-05-03 DIAGNOSIS — R65.20 SEVERE SEPSIS (HCC): ICD-10-CM

## 2021-05-03 DIAGNOSIS — E11.42 TYPE 2 DIABETES MELLITUS WITH DIABETIC POLYNEUROPATHY, WITH LONG-TERM CURRENT USE OF INSULIN (HCC): ICD-10-CM

## 2021-05-03 DIAGNOSIS — A41.9 SEVERE SEPSIS (HCC): ICD-10-CM

## 2021-05-03 PROCEDURE — 99214 OFFICE O/P EST MOD 30 MIN: CPT | Performed by: FAMILY MEDICINE

## 2021-05-03 PROCEDURE — G0439 PPPS, SUBSEQ VISIT: HCPCS | Performed by: FAMILY MEDICINE

## 2021-05-03 PROCEDURE — 1170F FXNL STATUS ASSESSED: CPT | Performed by: FAMILY MEDICINE

## 2021-05-03 PROCEDURE — 1159F MED LIST DOCD IN RCRD: CPT | Performed by: FAMILY MEDICINE

## 2021-05-03 RX ORDER — PERPHENAZINE 16 MG
1 TABLET ORAL DAILY
Qty: 90 EACH | Refills: 3 | Status: SHIPPED | OUTPATIENT
Start: 2021-05-03 | End: 2022-01-17

## 2021-05-03 NOTE — PATIENT INSTRUCTIONS
Medicare Wellness  Personal Prevention Plan of Service     Date of Office Visit:  2021  Encounter Provider:  Suresh Rolon Jr., MD  Place of Service:  [facility]  Patient Name: Andrea Fam  :  1950    As part of the Medicare Wellness portion of your visit today, we are providing you with this personalized preventive plan of services (PPPS). This plan is based upon recommendations of the [place] Preventive Services Task Force (USPSTF) and the Advisory Committee on Immunization Practices (ACIP).    This lists the preventive care services that should be considered, and provides dates of when you are due. Items listed as completed are up-to-date and do not require any further intervention.    Health Maintenance   Topic Date Due   • ZOSTER VACCINE (2 of 2) 10/27/2017   • DIABETIC FOOT EXAM  2018   • ANNUAL WELLNESS VISIT  2018   • Pneumococcal Vaccine 65+ (2 of 2 - PPSV23) 2019   • URINE MICROALBUMIN  2019   • INFLUENZA VACCINE  2021   • HEMOGLOBIN A1C  10/13/2021   • DIABETIC EYE EXAM  2022   • LIPID PANEL  2022   • COLONOSCOPY  2027   • TDAP/TD VACCINES (3 - Td) 2028   • COVID-19 Vaccine  Completed   • AAA SCREEN (ONE-TIME)  Completed   • HEPATITIS C SCREENING  Addressed       No orders of the defined types were placed in this encounter.      No follow-ups on file.

## 2021-05-03 NOTE — PROGRESS NOTES
The ABCs of the Annual Wellness Visit  Subsequent Medicare Wellness Visit    Chief Complaint   Patient presents with   • Medicare Wellness-subsequent       Subjective   History of Present Illness:  Andrea Fam is a 71 y.o. male who presents for a Subsequent Medicare Wellness Visit.    HEALTH RISK ASSESSMENT    Recent Hospitalizations:  Recently treated at the following:  Other: [facility]    Current Medical Providers:  Patient Care Team:  Suresh Rolon Jr., MD as PCP - General (Family Medicine)  Asya Henao MD as Consulting Physician (Endocrinology)  John Mcguire MD as Consulting Physician (Pulmonary Disease)  Tamia Carrillo MD as Consulting Physician (Ophthalmology)  Jamal Prince MD as Consulting Physician (Orthopedic Surgery)    Smoking Status:  Social History     Tobacco Use   Smoking Status Former Smoker   • Packs/day: 1.50   • Years: 30.00   • Pack years: 45.00   • Types: Cigarettes   • Quit date:    • Years since quittin.3   Smokeless Tobacco Never Used   Tobacco Comment    QUIT AGE 48       Alcohol Consumption:  Social History     Substance and Sexual Activity   Alcohol Use Yes    Comment: 2  drinks per  month       Depression Screen:   PHQ-2/PHQ-9 Depression Screening 5/3/2021   Little interest or pleasure in doing things 0   Feeling down, depressed, or hopeless 0   Trouble falling or staying asleep, or sleeping too much 0   Feeling tired or having little energy 0   Poor appetite or overeating 0   Feeling bad about yourself - or that you are a failure or have let yourself or your family down 0   Trouble concentrating on things, such as reading the newspaper or watching television 0   Moving or speaking so slowly that other people could have noticed. Or the opposite - being so fidgety or restless that you have been moving around a lot more than usual 0   Thoughts that you would be better off dead, or of hurting yourself in some way 0   Total Score 0       Fall Risk  Screen:  MAURICE Fall Risk Assessment was completed, and patient is at HIGH risk for falls. Assessment completed on:5/3/2021    Health Habits and Functional and Cognitive Screening:  Functional & Cognitive Status 5/3/2021   Do you have difficulty preparing food and eating? No   Do you have difficulty bathing yourself, getting dressed or grooming yourself? No   Do you have difficulty using the toilet? No   Do you have difficulty moving around from place to place? No   Do you have trouble with steps or getting out of a bed or a chair? Yes   Current Diet Well Balanced Diet   Dental Exam Up to date   Eye Exam Up to date   Exercise (times per week) 2 times per week   Current Exercise Activities Include Cardiovasular Workout on Exercise Equipment   Do you need help using the phone?  No   Are you deaf or do you have serious difficulty hearing?  No   Do you need help with transportation? No   Do you need help shopping? No   Do you need help preparing meals?  No   Do you need help with housework?  No   Do you need help with laundry? No   Do you need help taking your medications? No   Do you need help managing money? No   Do you ever drive or ride in a car without wearing a seat belt? No   Have you felt unusual stress, anger or loneliness in the last month? No   Who do you live with? Spouse   If you need help, do you have trouble finding someone available to you? No   Have you been bothered in the last four weeks by sexual problems? No   Do you have difficulty concentrating, remembering or making decisions? No         Does the patient have evidence of cognitive impairment? No    Asprin use counseling:Does not need ASA (and currently is not on it)    Age-appropriate Screening Schedule:  Refer to the list below for future screening recommendations based on patient's age, sex and/or medical conditions. Orders for these recommended tests are listed in the plan section. The patient has been provided with a written plan.    Health  Maintenance   Topic Date Due   • ZOSTER VACCINE (2 of 2) 10/27/2017   • DIABETIC FOOT EXAM  03/13/2018   • URINE MICROALBUMIN  07/25/2019   • INFLUENZA VACCINE  08/01/2021   • HEMOGLOBIN A1C  10/13/2021   • DIABETIC EYE EXAM  02/17/2022   • LIPID PANEL  04/13/2022   • COLONOSCOPY  09/01/2027   • TDAP/TD VACCINES (3 - Td) 06/08/2028          The following portions of the patient's history were reviewed and updated as appropriate: allergies, current medications, past family history, past medical history, past social history, past surgical history and problem list.    Outpatient Medications Prior to Visit   Medication Sig Dispense Refill   • atorvastatin (LIPITOR) 20 MG tablet TAKE 1 TABLET DAILY 90 tablet 3   • Blood Glucose Monitoring Suppl (ONETOUCH VERIO) w/Device kit 1 Device As Needed (USE TO TO TEST BLOOD SUGAR 2 TIMES DAILY). 1 kit 0   • Cholecalciferol (VITAMIN D) 2000 UNITS tablet Take 2,000 Units by mouth 2 (Two) Times a Day.     • Dapagliflozin Propanediol (Farxiga) 10 MG tablet Take 10 mg by mouth Daily. 30 tablet 11   • FOLIC ACID PO Take 400 mcg by mouth 2 (Two) Times a Day. WILL HOLD PRIOR TO SURGERY     • gabapentin (NEURONTIN) 300 MG capsule TAKE 3 CAPSULES IN THE MORNING AND 3 CAPSULES IN THE EVENING (Patient taking differently: TAKE 2 CAPSULES IN THE MORNING  2 tablets @4 AND 2 CAPSULES IN THE EVENING) 540 capsule 0   • Insulin Pen Needle (NovoFine Plus) 32G X 4 MM misc Use to inject insulin 2 times daily 200 each 3   • levothyroxine (SYNTHROID, LEVOTHROID) 50 MCG tablet TAKE 1 TABLET DAILY 90 tablet 3   • metFORMIN (GLUCOPHAGE) 500 MG tablet TAKE 2 TABLETS TWICE A DAY WITH MEALS 360 tablet 3   • Multiple Vitamin (MULTI-VITAMIN DAILY PO) Take 1 tablet by mouth Daily.     • Omega-3 Fatty Acids (FISH OIL) 1000 MG capsule capsule Take 1,200 mg by mouth 2 (Two) Times a Day With Meals.     • ONETOUCH DELICA LANCETS 33G misc USE TO TEST BLOOD SUGAR 2 TIMES DAILY 100 each 5   • ONETOUCH VERIO test strip  "USE TO TEST BLOOD SUGAR 2 TIMES DAILY 100 each 5   • Semaglutide, 1 MG/DOSE, (Ozempic, 1 MG/DOSE,) 2 MG/1.5ML solution pen-injector Inject 1 mg under the skin into the appropriate area as directed 1 (One) Time Per Week. 6 pen 3   • Toujeo SoloStar 300 UNIT/ML solution pen-injector injection INJECT 22 UNITS UNDER THE SKIN EVERY NIGHT 13.5 mL 4   • vitamin B-12 (CYANOCOBALAMIN) 1000 MCG tablet Take 1,000 mcg by mouth Every Other Day. TAKES ON ODD NUMBER DAYS  WILL HOLD PRIOR TO SURGERY     • vitamin C (ASCORBIC ACID) 500 MG tablet Take 1,000 mg by mouth Daily. PT TO HOLD PRIOR TO SURGERY       No facility-administered medications prior to visit.       Patient Active Problem List   Diagnosis   • Rotator cuff insufficiency of right shoulder   • Type 2 diabetes mellitus (CMS/HCC)   • Impotence of organic origin   • Peripheral nerve disease   • Sacral pain   • Hypogonadism in male   • LISANDRA (obstructive sleep apnea) on auto CPAP   • Weak urinary stream   • Mixed hyperlipidemia   • Encounter for laboratory testing for COVID-19 virus   • HTN (hypertension)   • Hypothyroidism   • Septic shock (CMS/HCC)   • Severe sepsis (CMS/HCC)       Advanced Care Planning:  ACP discussion was held with the patient during this visit. Patient has an advance directive in EMR which is still valid.     Review of Systems    Compared to one year ago, the patient feels his physical health is the same.  Compared to one year ago, the patient feels his mental health is the same.    Reviewed chart for potential of high risk medication in the elderly: yes  Reviewed chart for potential of harmful drug interactions in the elderly:yes    Objective         Vitals:    05/03/21 1545   BP: 128/80   BP Location: Left arm   Patient Position: Sitting   Cuff Size: Adult   Pulse: 69   Temp: 99.3 °F (37.4 °C)   SpO2: 98%   Weight: 89.4 kg (197 lb)   Height: 175.3 cm (69\")   PainSc:   6   PainLoc: Hand  Comment: both hands and feet       Body mass index is 29.09 " kg/m².  Discussed the patient's BMI with him. The BMI is in the acceptable range.    Physical Exam    Lab Results   Component Value Date     (H) 04/13/2021    CHLPL 100 04/13/2021    TRIG 137 04/13/2021    HDL 31 (L) 04/13/2021    LDL 45 04/13/2021    VLDL 24 04/13/2021    HGBA1C 7.20 (H) 04/13/2021        Assessment/Plan   Medicare Risks and Personalized Health Plan  CMS Preventative Services Quick Reference  Cardiovascular risk  Diabetic Lab Screening   Fall Risk    The above risks/problems have been discussed with the patient.  Pertinent information has been shared with the patient in the After Visit Summary.  Follow up plans and orders are seen below in the Assessment/Plan Section.    There are no diagnoses linked to this encounter.  Follow Up:  No follow-ups on file.     An After Visit Summary and PPPS were given to the patient.

## 2021-05-03 NOTE — TELEPHONE ENCOUNTER
MR PENA CALLED BACK RETURNING CALL BACK TO [facility] DUE TO NO AVAILABILITY FOR NEW PATIENT APPT, TRIED TO WARM TRANSFER TO NON CLINICAL NO ANSWER PLEASE GIVE PATIENT A CALL BACK @ [phone] TO GET THIS SCHEDULED.

## 2021-05-03 NOTE — TELEPHONE ENCOUNTER
Hub staff attempted to follow warm transfer process and was unsuccessful     Caller: LAURIE PENA    Relationship to patient: SELF    Best call back number: [phone]    Patient is needing: PT CALLED [facility] TO SCHEDULE APPT W/ DR GARCIA FOR DIABETIC FOOT EXAM. [facility] COULD NOT FIND AVAILABILITY W/IN TIME FRAME, ATTEMPTED TO WARM TRANSFER TO  BUT WAS UNSUCCESSFUL. PLEASE CALL THE PT BACK TO SCHEDULE.

## 2021-05-03 NOTE — PROGRESS NOTES
"Chief Complaint  Medicare Wellness-subsequent    Subjective          Andrea Fam presents to [facility] PRIMARY CARE  Very robust patient reviewed with history of diabetes type 2 insulin requiring as well as peripheral neuropathy and diabetic painful peripheral neuropathy ongoing hands and feet treated with gabapentin.  He desires referral to neurology for opinion.  Otherwise treatment of hypothyroidism initiate trial quadricep reviewed as well as admission to the hospital for UTI and sepsis July 6 last year.  He has had Covid vaccination but desires not to take any more vaccines.          Objective   Vital Signs:   /80 (BP Location: Left arm, Patient Position: Sitting, Cuff Size: Adult)   Pulse 69   Temp 99.3 °F (37.4 °C)   Ht 175.3 cm (69\")   Wt 89.4 kg (197 lb)   SpO2 98%   BMI 29.09 kg/m²     Physical Exam  Vitals reviewed.   Constitutional:       Appearance: He is well-developed.   HENT:      Head: Normocephalic and atraumatic.      Right Ear: Tympanic membrane and external ear normal.      Left Ear: Tympanic membrane and external ear normal.   Eyes:      Conjunctiva/sclera: Conjunctivae normal.      Pupils: Pupils are equal, round, and reactive to light.   Neck:      Thyroid: No thyromegaly.      Vascular: No JVD.   Cardiovascular:      Rate and Rhythm: Normal rate and regular rhythm.      Heart sounds: Normal heart sounds.   Pulmonary:      Effort: Pulmonary effort is normal.      Breath sounds: Normal breath sounds.   Abdominal:      General: Bowel sounds are normal.      Palpations: Abdomen is soft.   Musculoskeletal:         General: Normal range of motion.      Cervical back: Normal range of motion and neck supple.   Lymphadenopathy:      Cervical: No cervical adenopathy.   Skin:     General: Skin is warm and dry.      Findings: No rash.   Neurological:      Mental Status: He is alert and oriented to person, place, and time.      Cranial Nerves: No cranial nerve deficit.     "  Coordination: Coordination normal.   Psychiatric:         Behavior: Behavior normal.         Thought Content: Thought content normal.         Judgment: Judgment normal.        Result Review :                 Assessment and Plan    Diagnoses and all orders for this visit:    1. Other polyneuropathy (Primary)  Comments:  Referral to neurology and trial of alpha lipoic acid  Orders:  -     Ambulatory Referral to Neurology    2. Severe sepsis (CMS/[facility])  Comments:  UTI recovered hospitalization reviewed    3. Type 2 diabetes mellitus with diabetic polyneuropathy, with long-term current use of insulin (CMS/[facility])  Comments:  Endocrinology management multiple medications reviewed.    4. Acquired hypothyroidism  Comments:  Continue current dose of thyroid medication    5. Mixed hyperlipidemia  Comments:  Continue current statin dose    6. Medicare annual wellness visit, initial    Other orders  -     Alpha-Lipoic Acid 600 MG capsule; Take 1 capsule by mouth Daily. For diabetic neuropathy  Dispense: 90 each; Refill: 3        Follow Up   Return in about 1 year (around 5/3/2022) for Medicare Wellness, Recheck.  Patient was given instructions and counseling regarding his condition or for health maintenance advice. Please see specific information pulled into the AVS if appropriate.

## 2021-06-07 ENCOUNTER — PATIENT MESSAGE (OUTPATIENT)
Dept: INTERNAL MEDICINE | Facility: CLINIC | Age: 71
End: 2021-06-07

## 2021-06-07 NOTE — TELEPHONE ENCOUNTER
"From: Andrea Fam  To: Suresh Rolon Jr., MD  Sent: 6/7/2021 3:06 PM EDT  Subject: Non-Urgent Medical Question    The [facility] Calls Representative suggested I have a \"Abdominal Aortic Ultrasound\" performed.  Can you put me in touch OR have them contact me to schedule such a procedure?  "

## 2021-07-13 DIAGNOSIS — E11.42 TYPE 2 DIABETES MELLITUS WITH PERIPHERAL NEUROPATHY (HCC): ICD-10-CM

## 2021-07-15 RX ORDER — GABAPENTIN 300 MG/1
CAPSULE ORAL
Qty: 540 CAPSULE | Refills: 0 | Status: SHIPPED | OUTPATIENT
Start: 2021-07-15 | End: 2021-07-20 | Stop reason: SDUPTHER

## 2021-07-15 RX ORDER — SEMAGLUTIDE 1.34 MG/ML
INJECTION, SOLUTION SUBCUTANEOUS
Qty: 9 ML | Refills: 3 | Status: SHIPPED | OUTPATIENT
Start: 2021-07-15 | End: 2021-12-01 | Stop reason: CLARIF

## 2021-07-20 DIAGNOSIS — E11.42 TYPE 2 DIABETES MELLITUS WITH PERIPHERAL NEUROPATHY (HCC): ICD-10-CM

## 2021-07-20 RX ORDER — GABAPENTIN 300 MG/1
CAPSULE ORAL
Qty: 72 CAPSULE | Refills: 0 | Status: SHIPPED | OUTPATIENT
Start: 2021-07-20 | End: 2021-10-25

## 2021-07-20 NOTE — TELEPHONE ENCOUNTER
Patient is waiting for mail order but something happen to order  He is needing a short order to be called in to the [facility]

## 2021-08-19 NOTE — PROGRESS NOTES
"Chief Complaint  Diabetes    Subjective          Andrea Fam presents to [facility] ENDOCRINOLOGY  History of Present Illness     I have reviewed PMH, allergies and medications UTD at this visit      Plan last visit  Add Farxiga 10 mg po daily.   Continue Toujeo 28 units daily.   Continue ozempic 1 mg subq once a week.       Type 2 dm    Diagnosed about 20+ years ago.   Today in clinic pt reports being on metformin - 2 tabs -  500 mg po bid, ozempic once a week, Toujeo 28 units daily.  Farxiga 10 mg 1/2 tablet daily, Ozempic 1 mg weekly  10mg farxiga caused too much urination  Checks BG - no  Dm retinopathy - x, Last eye exam - yesterday  Dm nephropathy - no  Dm neuropathy - yes, has not seen podiatry yet, not interested at this time, Dm neuropathy meds - gabapentin 300mg 6 tablets daily through the day  CAD -x  CVA -x  Episodes of hypoglycemia - x  Pt is physically active. weight has been stable.   Pt tries to follow DM diet for most part.   Lab Results   Component Value Date    HGBA1C 6.90 (H) 08/10/2021          Hyperlipidemia  On Lipitor 20 mg oral daily, fish oil 2 times daily  Tolerating well  Lab Results   Component Value Date    CHLPL 122 08/10/2021    TRIG 160 (H) 08/10/2021    HDL 32 (L) 08/10/2021    LDL 62 08/10/2021       Hypothyroidism  On levothyroxine 50 mcg oral daily  Denies hyper and hypothyroid   Lab Results   Component Value Date    TSH 0.970 08/10/2021              Objective   Vital Signs:   /68 (BP Location: Right arm, Patient Position: Sitting, Cuff Size: Adult)   Ht 175.3 cm (69.02\")   Wt 88.6 kg (195 lb 6.4 oz)   BMI 28.84 kg/m²     Physical Exam  Vitals reviewed.   Constitutional:       General: He is not in acute distress.  HENT:      Head: Normocephalic and atraumatic.   Cardiovascular:      Rate and Rhythm: Normal rate and regular rhythm.   Pulmonary:      Effort: Pulmonary effort is normal. No respiratory distress.   Musculoskeletal:         General: No " signs of injury. Normal range of motion.      Cervical back: Normal range of motion and neck supple.   Skin:     General: Skin is warm and dry.   Neurological:      Mental Status: He is alert and oriented to person, place, and time. Mental status is at baseline.   Psychiatric:         Mood and Affect: Mood normal.         Behavior: Behavior normal.         Thought Content: Thought content normal.         Judgment: Judgment normal.          Result Review :   The following data was reviewed by: NATALIE Hernandez on 08/20/2021:  Common labs    Common Labsle 10/20/20 10/20/20 10/20/20 4/13/21 4/13/21 4/13/21 4/13/21 8/10/21 8/10/21 8/10/21 8/10/21    0849 0849 0849 1004 1004 1004 1004 1013 1013 1013 1013   Glucose  109 (A)  135 (A)    149 (A)      BUN  10  14    12      Creatinine  0.82  0.98    0.90      eGFR Non  Am  92  75    83      eGFR  Am  112  91    100      Sodium  140  139    144      Potassium  4.8  4.8    4.8      Chloride  101  101    101      Calcium  9.9  10.3    10.5      Total Protein  6.5  6.7          Albumin  4.50  4.50          Total Bilirubin  0.3  0.5          Alkaline Phosphatase  61  65          AST (SGOT)  18  29          ALT (SGPT)  18  30          Hemoglobin      12.9 (A)        Hematocrit      38.6        Total Cholesterol   118  100     122    Triglycerides   104  137     160 (A)    HDL Cholesterol   45  31 (A)     32 (A)    LDL Cholesterol    54  45     62    Hemoglobin A1C 5.70 (A)      7.20 (A)  6.90 (A)     Microalbumin, Urine           11.9   (A) Abnormal value       Comments are available for some flowsheets but are not being displayed.                     Assessment and Plan    Diagnoses and all orders for this visit:    1. Type 2 diabetes mellitus with peripheral neuropathy (CMS/HCC) (Primary)  -     Comprehensive Metabolic Panel; Future  -     Hemoglobin A1c; Future  -     Lipid Panel; Future  -     Microalbumin / Creatinine Urine Ratio - Urine, Clean Catch;  Future  -     TSH; Future  -     T4, Free; Future    2. Hyperlipemia, mixed  -     Comprehensive Metabolic Panel; Future  -     Hemoglobin A1c; Future  -     Lipid Panel; Future  -     Microalbumin / Creatinine Urine Ratio - Urine, Clean Catch; Future  -     TSH; Future  -     T4, Free; Future    3. Secondary hypothyroidism  -     Comprehensive Metabolic Panel; Future  -     Hemoglobin A1c; Future  -     Lipid Panel; Future  -     Microalbumin / Creatinine Urine Ratio - Urine, Clean Catch; Future  -     TSH; Future  -     T4, Free; Future        Follow Up   No follow-ups on file.     Will see Dr. Henao in January  Continue Farxiga, Toujeo, Metformin, Ozempic  Continue statin  A1c at goal less than 7%  Daily diabetic foot care    Continue current T4 dose  Needs close monitoring to reduce risk of complications from over or under treatment with thyroid hormone replacement      Patient was given instructions and counseling regarding his condition or for health maintenance advice. Please see specific information pulled into the AVS if appropriate.     NATALIE Hernandez

## 2021-08-20 ENCOUNTER — OFFICE VISIT (OUTPATIENT)
Dept: ENDOCRINOLOGY | Age: 71
End: 2021-08-20

## 2021-08-20 VITALS
HEIGHT: 69 IN | WEIGHT: 195.4 LBS | DIASTOLIC BLOOD PRESSURE: 68 MMHG | SYSTOLIC BLOOD PRESSURE: 122 MMHG | BODY MASS INDEX: 28.94 KG/M2

## 2021-08-20 DIAGNOSIS — E11.42 TYPE 2 DIABETES MELLITUS WITH PERIPHERAL NEUROPATHY (HCC): Primary | ICD-10-CM

## 2021-08-20 DIAGNOSIS — E03.8 SECONDARY HYPOTHYROIDISM: ICD-10-CM

## 2021-08-20 DIAGNOSIS — E78.2 HYPERLIPEMIA, MIXED: ICD-10-CM

## 2021-08-20 PROCEDURE — 99214 OFFICE O/P EST MOD 30 MIN: CPT | Performed by: NURSE PRACTITIONER

## 2021-09-01 ENCOUNTER — OFFICE VISIT (OUTPATIENT)
Dept: SLEEP MEDICINE | Facility: HOSPITAL | Age: 71
End: 2021-09-01

## 2021-09-01 VITALS — BODY MASS INDEX: 28.2 KG/M2 | HEIGHT: 70 IN | WEIGHT: 197 LBS

## 2021-09-01 DIAGNOSIS — G47.33 OSA (OBSTRUCTIVE SLEEP APNEA): Primary | ICD-10-CM

## 2021-09-01 PROCEDURE — G0463 HOSPITAL OUTPT CLINIC VISIT: HCPCS

## 2021-09-01 PROCEDURE — 99213 OFFICE O/P EST LOW 20 MIN: CPT | Performed by: INTERNAL MEDICINE

## 2021-09-01 NOTE — PROGRESS NOTES
"Follow Up Sleep Disorders Center Note     Chief Complaint:  LISANDRA     Primary Care Physician: Suresh Rolon Jr., MD    Interval History:   The patient is a 71 y.o. male  who I last saw 2020 and that note was reviewed.  The patient states he is doing fine without new complaints.  He goes to bed at 11:30 PM and awakens at 9 AM.  He will use the restroom during that time.    Self-administered Sprague Sleepiness Scale test results: 4  0-5 Lower normal daytime sleepiness  6-10 Higher normal daytime sleepiness  11-12 Mild, 13-15 Moderate, & 16-24 Severe excessive daytime sleepiness    Review of Systems:    A complete review of systems was done and all were negative with the exception of the above    Social History:    Social History     Socioeconomic History   • Marital status:      Spouse name: Not on file   • Number of children: Not on file   • Years of education: Not on file   • Highest education level: Not on file   Tobacco Use   • Smoking status: Former Smoker     Packs/day: 1.50     Years: 30.00     Pack years: 45.00     Types: Cigarettes     Quit date:      Years since quittin.6   • Smokeless tobacco: Never Used   • Tobacco comment: QUIT AGE 48   Substance and Sexual Activity   • Alcohol use: Yes     Comment: 2  drinks per  month   • Drug use: No   • Sexual activity: Never       Allergies:  Lisinopril     Medication Review:  Reviewed.      Vital Signs:    Vitals:    21 1100   Weight: 89.4 kg (197 lb)   Height: 177.8 cm (70\")     Body mass index is 28.27 kg/m².    Physical Exam:    Constitutional:  Well developed 71 y.o. male that appears in no apparent distress.  Awake & oriented times 3.  Normal mood with normal recent and remote memory and normal judgement.  Eyes:  Conjunctivae normal.  Oropharynx: Previously, moist mucous membranes without exudate and a large tongue and normal uvula that is long and tapered and mild-moderate narrowing of the posterior pharyngeal opening and class II-3 " Mallampati airway, patient is wearing a facemask.     Downloaded PAP Data Reviewed For Compliance:  DME is [facility] and he uses a nasal mask.  Downloads between 6/1 and 8/29/2021 compliance 99%.  Average usage is 8 hours and 47 minutes.  Average AHI is normal without leak.  Average auto CPAP pressure is 7.5 and his auto CPAP is 5-15.    I have reviewed the above results and compared them with the patient's last downloads and reviewed with the patient.    Impression:   Obstructive sleep apnea adequately treated with auto CPAP. The patient appears to be at goal with good compliance and usage. The patient has no complaints of hypersomnolence.    Plan:  Good sleep hygiene measures should be maintained.  Some weight loss would be beneficial in this patient who is overweight by BMI.      After evaluating the patient and assessing results available, the patient is benefiting from the treatment being provided.     The patient will continue auto CPAP.  After clinical evaluation and review of downloads, I recommend no changes to the patient's pressures.  A new prescription will be sent to the patient's DME.    I answered all of the patient's questions.  The patient will call for any problems and will follow up in 1 year.      John Mcguire MD  Sleep Medicine  09/01/21  11:09 EDT

## 2021-10-20 ENCOUNTER — TELEPHONE (OUTPATIENT)
Dept: INTERNAL MEDICINE | Facility: CLINIC | Age: 71
End: 2021-10-20

## 2021-10-20 NOTE — TELEPHONE ENCOUNTER
PATIENT STATES: THAT HE WOULD LIKE TO SEE A BACK SPECIALIST FOR HIS BACK PAIN PLEASE ADVISE      PATIENT CAN BE REACHED ON: [phone]

## 2021-10-21 NOTE — TELEPHONE ENCOUNTER
Left msg on the pt's voicemail that he would need a visit to evaluate his symptoms and order any appropriate testing before referring out. Dr Rolon is booked out through December I believe so he may want to see one of the NP's   OK FOR HUB TO READ

## 2021-10-22 DIAGNOSIS — E11.42 TYPE 2 DIABETES MELLITUS WITH PERIPHERAL NEUROPATHY (HCC): ICD-10-CM

## 2021-10-25 RX ORDER — GABAPENTIN 300 MG/1
CAPSULE ORAL
Qty: 540 CAPSULE | Refills: 0 | Status: SHIPPED | OUTPATIENT
Start: 2021-10-25 | End: 2022-01-14 | Stop reason: SDUPTHER

## 2021-10-27 ENCOUNTER — OFFICE VISIT (OUTPATIENT)
Dept: INTERNAL MEDICINE | Facility: CLINIC | Age: 71
End: 2021-10-27

## 2021-10-27 VITALS
HEIGHT: 70 IN | WEIGHT: 197.4 LBS | SYSTOLIC BLOOD PRESSURE: 124 MMHG | TEMPERATURE: 98.6 F | BODY MASS INDEX: 28.26 KG/M2 | OXYGEN SATURATION: 97 % | DIASTOLIC BLOOD PRESSURE: 72 MMHG | HEART RATE: 86 BPM

## 2021-10-27 DIAGNOSIS — M54.31 SCIATICA OF RIGHT SIDE: Primary | ICD-10-CM

## 2021-10-27 PROCEDURE — 99213 OFFICE O/P EST LOW 20 MIN: CPT | Performed by: NURSE PRACTITIONER

## 2021-10-27 RX ORDER — PREDNISONE 50 MG/1
50 TABLET ORAL DAILY
Qty: 5 TABLET | Refills: 0 | Status: SHIPPED | OUTPATIENT
Start: 2021-10-27 | End: 2021-11-01

## 2021-10-27 RX ORDER — CYCLOBENZAPRINE HCL 10 MG
10 TABLET ORAL 2 TIMES DAILY PRN
Qty: 20 TABLET | Refills: 0 | Status: SHIPPED | OUTPATIENT
Start: 2021-10-27 | End: 2021-11-06

## 2021-10-27 NOTE — PATIENT INSTRUCTIONS
Sciatica    Sciatica is pain, weakness, tingling, or loss of feeling (numbness) along the sciatic nerve. The sciatic nerve starts in the lower back and goes down the back of each leg. Sciatica usually goes away on its own or with treatment. Sometimes, sciatica may come back (recur).  What are the causes?  This condition happens when the sciatic nerve is pinched or has pressure put on it. This may be the result of:  · A disk in between the bones of the spine bulging out too far (herniated disk).  · Changes in the spinal disks that occur with aging.  · A condition that affects a muscle in the butt.  · Extra bone growth near the sciatic nerve.  · A break (fracture) of the area between your hip bones (pelvis).  · Pregnancy.  · Tumor. This is rare.  What increases the risk?  You are more likely to develop this condition if you:  · Play sports that put pressure or stress on the spine.  · Have poor strength and ease of movement (flexibility).  · Have had a back injury in the past.  · Have had back surgery.  · Sit for long periods of time.  · Do activities that involve bending or lifting over and over again.  · Are very overweight (obese).  What are the signs or symptoms?  Symptoms can vary from mild to very bad. They may include:  · Any of these problems in the lower back, leg, hip, or butt:  ? Mild tingling, loss of feeling, or dull aches.  ? Burning sensations.  ? Sharp pains.  · Loss of feeling in the back of the calf or the sole of the foot.  · Leg weakness.  · Very bad back pain that makes it hard to move.  These symptoms may get worse when you cough, sneeze, or laugh. They may also get worse when you sit or stand for long periods of time.  How is this treated?  This condition often gets better without any treatment. However, treatment may include:  · Changing or cutting back on physical activity when you have pain.  · Doing exercises and stretching.  · Putting ice or heat on the affected area.  · Medicines that  help:  ? To relieve pain and swelling.  ? To relax your muscles.  · Shots (injections) of medicines that help to relieve pain, irritation, and swelling.  · Surgery.  Follow these instructions at home:  Medicines  · Take over-the-counter and prescription medicines only as told by your doctor.  · Ask your doctor if the medicine prescribed to you:  ? Requires you to avoid driving or using heavy machinery.  ? Can cause trouble pooping (constipation). You may need to take these steps to prevent or treat trouble pooping:  § Drink enough fluids to keep your pee (urine) pale yellow.  § Take over-the-counter or prescription medicines.  § Eat foods that are high in fiber. These include beans, whole grains, and fresh fruits and vegetables.  § Limit foods that are high in fat and sugar. These include fried or sweet foods.  Managing pain         · If told, put ice on the affected area.  ? Put ice in a plastic bag.  ? Place a towel between your skin and the bag.  ? Leave the ice on for 20 minutes, 2-3 times a day.  · If told, put heat on the affected area. Use the heat source that your doctor tells you to use, such as a moist heat pack or a heating pad.  ? Place a towel between your skin and the heat source.  ? Leave the heat on for 20-30 minutes.  ? Remove the heat if your skin turns bright red. This is very important if you are unable to feel pain, heat, or cold. You may have a greater risk of getting burned.  Activity    · Return to your normal activities as told by your doctor. Ask your doctor what activities are safe for you.  · Avoid activities that make your symptoms worse.  · Take short rests during the day.  ? When you rest for a long time, do some physical activity or stretching between periods of rest.  ? Avoid sitting for a long time without moving. Get up and move around at least one time each hour.  · Exercise and stretch regularly, as told by your doctor.  · Do not lift anything that is heavier than 10 lb (4.5 kg)  while you have symptoms of sciatica.  ? Avoid lifting heavy things even when you do not have symptoms.  ? Avoid lifting heavy things over and over.  · When you lift objects, always lift in a way that is safe for your body. To do this, you should:  ? Bend your knees.  ? Keep the object close to your body.  ? Avoid twisting.    General instructions  · Stay at a healthy weight.  · Wear comfortable shoes that support your feet. Avoid wearing high heels.  · Avoid sleeping on a mattress that is too soft or too hard. You might have less pain if you sleep on a mattress that is firm enough to support your back.  · Keep all follow-up visits as told by your doctor. This is important.  Contact a doctor if:  · You have pain that:  ? Wakes you up when you are sleeping.  ? Gets worse when you lie down.  ? Is worse than the pain you have had in the past.  ? Lasts longer than 4 weeks.  · You lose weight without trying.  Get help right away if:  · You cannot control when you pee (urinate) or poop (have a bowel movement).  · You have weakness in any of these areas and it gets worse:  ? Lower back.  ? The area between your hip bones.  ? Butt.  ? Legs.  · You have redness or swelling of your back.  · You have a burning feeling when you pee.  Summary  · Sciatica is pain, weakness, tingling, or loss of feeling (numbness) along the sciatic nerve.  · This condition happens when the sciatic nerve is pinched or has pressure put on it.  · Sciatica can cause pain, tingling, or loss of feeling (numbness) in the lower back, legs, hips, and butt.  · Treatment often includes rest, exercise, medicines, and putting ice or heat on the affected area.  This information is not intended to replace advice given to you by your health care provider. Make sure you discuss any questions you have with your health care provider.  Document Revised: 01/06/2020 Document Reviewed: 01/06/2020  Elsevier Patient Education © 2021 [employer]

## 2021-10-27 NOTE — PROGRESS NOTES
"Chief Complaint  Back Pain    Subjective          Andrea Fam presents to [facility] PRIMARY CARE  History of Present Illness    Patient is a pleasant 71-year-old male who typically sees Dr. Rolon in the office.  Patient is here today with his wife and he gives me verbal consent to speak about his care in front of her today.  Patient states he has a history of back pain with a lumbar surgery back in 1985.  Patient states he has lower back pain from time to time throughout the years.  Patient states at this time he is having a bout of sciatica that is on the right side (SI joint) and goes into right buttock and down her right hamstring.  Patient states he has been taking over-the-counter Tylenol/ibuprofen as needed for this pain.  Patient denies any loss of bowel or bladder at this time.    Objective   Vital Signs:   /72 (BP Location: Right arm, Patient Position: Sitting, Cuff Size: Adult)   Pulse 86   Temp 98.6 °F (37 °C)   Ht 177.8 cm (70\")   Wt 89.5 kg (197 lb 6.4 oz)   SpO2 97%   BMI 28.32 kg/m²     Physical Exam  Vitals and nursing note reviewed.   Constitutional:       Appearance: Normal appearance.   HENT:      Head: Normocephalic.      Mouth/Throat:      Mouth: Mucous membranes are moist.   Eyes:      Pupils: Pupils are equal, round, and reactive to light.   Cardiovascular:      Rate and Rhythm: Normal rate and regular rhythm.      Pulses: Normal pulses.      Heart sounds: Normal heart sounds.      Comments: No peripheral edema noted.   Pulmonary:      Effort: Pulmonary effort is normal. No respiratory distress.      Breath sounds: Normal breath sounds. No stridor. No wheezing, rhonchi or rales.   Chest:      Chest wall: No tenderness.   Musculoskeletal:         General: No swelling, tenderness or signs of injury. Normal range of motion.      Comments: Patient c/o R sided sciatica pain off and on for many years. Not reproducible today.    Skin:     General: Skin is warm.      " Capillary Refill: Capillary refill takes less than 2 seconds.   Neurological:      Mental Status: He is alert and oriented to person, place, and time.   Psychiatric:         Behavior: Behavior normal.        Result Review :            Office Visit with Suresh Rolon Jr., MD (05/03/2021)  Basic Metabolic Panel (08/10/2021 10:13 AM)  Hemoglobin A1c (08/10/2021 10:13 AM)  Lipid Panel (08/10/2021 10:13 AM)       Assessment and Plan    Diagnoses and all orders for this visit:    1. Sciatica of right side (Primary)  -     Ambulatory Referral to Orthopedic Surgery    Other orders  -     predniSONE (DELTASONE) 50 MG tablet; Take 1 tablet by mouth Daily for 5 days.  Dispense: 5 tablet; Refill: 0  -     cyclobenzaprine (FLEXERIL) 10 MG tablet; Take 1 tablet by mouth 2 (Two) Times a Day As Needed for Muscle Spasms for up to 10 days.  Dispense: 20 tablet; Refill: 0    Patient will stop at the pharmacy and  his prescription for prednisone and Flexeril and take as directed.  An ambulatory referral was placed for orthopedic surgery in the office today.  Patient will also start to walk daily and do daily stretching for the sciatica.  If patient has any worsening symptoms or any loss of bowel or bladder he needs to go to the emergency room.  Patient and wife both agree with this treatment plan at this time.    Follow Up   Return if symptoms worsen or fail to improve.  Patient was given instructions and counseling regarding his condition or for health maintenance advice. Please see specific information pulled into the AVS if appropriate.

## 2021-10-28 ENCOUNTER — TELEPHONE (OUTPATIENT)
Dept: INTERNAL MEDICINE | Facility: CLINIC | Age: 71
End: 2021-10-28

## 2021-10-28 NOTE — TELEPHONE ENCOUNTER
Caller: Andrea Fam    Relationship: Self    Best call back number: [phone]    Who are you requesting to speak with (clinical staff, provider,  specific staff member): CLINICAL STAFF     What was the call regarding: HAS QUESTIONS ABOUT MEDICATION PRESCRIBED TO HIM YESTERDAY BY SUNNY MORA CONFUSED ON WHICH ONE TO TAKE AT NIGHT TIME BEFORE BED     Do you require a callback: YES

## 2021-10-28 NOTE — TELEPHONE ENCOUNTER
Pt advised that most likely she was referring to the cyclobenzaprine which can make him drowsy but he can take it twice a day as long as he is not driving or operating any machinery

## 2021-11-12 ENCOUNTER — TELEPHONE (OUTPATIENT)
Dept: INTERNAL MEDICINE | Facility: CLINIC | Age: 71
End: 2021-11-12

## 2021-11-12 NOTE — TELEPHONE ENCOUNTER
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 HE IS LOCATED AT [address] [facility] [address]

## 2021-11-12 NOTE — TELEPHONE ENCOUNTER
I don't Neuro will see him for this? He saw Jessica a few weeks ago  Please advise and or put in appropriate referral

## 2021-11-14 DIAGNOSIS — M54.31 SCIATICA OF RIGHT SIDE: Primary | ICD-10-CM

## 2021-11-15 NOTE — TELEPHONE ENCOUNTER
Can you look into this and let Dr Rolon know if he needs to order a scan before Dr Donaldson will see him

## 2021-11-15 NOTE — TELEPHONE ENCOUNTER
I PLACED A REFERRAL TO DR MCGRATH HOWEVER WE WOULD NEED TO CANCEL REFERRAL TO DR. MCGRATH AND HE MAY NEED MRI BEFORE NEUROSURGERY WOULD SEE HIM.

## 2021-11-19 ENCOUNTER — TELEPHONE (OUTPATIENT)
Dept: INTERNAL MEDICINE | Facility: CLINIC | Age: 71
End: 2021-11-19

## 2021-11-19 NOTE — TELEPHONE ENCOUNTER
Message sent from Dr Lennon office for referal.:       PT WILL NEED RECENT MRI IN ORDER TO SEE DR. LENNON.    Please order imaging

## 2021-11-23 DIAGNOSIS — M54.31 SCIATICA OF RIGHT SIDE: Primary | ICD-10-CM

## 2021-12-01 RX ORDER — DULAGLUTIDE 1.5 MG/.5ML
1.5 INJECTION, SOLUTION SUBCUTANEOUS WEEKLY
Qty: 12 PEN | Refills: 0 | Status: SHIPPED | OUTPATIENT
Start: 2021-12-01 | End: 2022-05-05 | Stop reason: SDUPTHER

## 2021-12-01 RX ORDER — LEVOTHYROXINE SODIUM 0.05 MG/1
TABLET ORAL
Qty: 90 TABLET | Refills: 3 | Status: SHIPPED | OUTPATIENT
Start: 2021-12-01 | End: 2022-11-08

## 2021-12-10 ENCOUNTER — HOSPITAL ENCOUNTER (OUTPATIENT)
Dept: MRI IMAGING | Facility: HOSPITAL | Age: 71
Discharge: HOME | End: 2021-12-10
Admitting: FAMILY MEDICINE

## 2021-12-10 DIAGNOSIS — M54.31 SCIATICA OF RIGHT SIDE: ICD-10-CM

## 2021-12-10 PROCEDURE — 72148 MRI LUMBAR SPINE W/O DYE: CPT

## 2021-12-12 ENCOUNTER — PATIENT MESSAGE (OUTPATIENT)
Dept: INTERNAL MEDICINE | Facility: CLINIC | Age: 71
End: 2021-12-12

## 2021-12-13 NOTE — TELEPHONE ENCOUNTER
From: Andrea Fam  To: Suresh Rolon Jr, MD  Sent: 12/12/2021 7:25 PM EST  Subject: MRI    I had an MRI Friday and need to get it into the hands of Dr. Phillips (already have an appointment 1/11/22) and Dr. Gomez (?) for an appointment.  Thanks

## 2021-12-23 NOTE — TELEPHONE ENCOUNTER
Hey can you check on the status on the referral to Dr. Donaldson and give him a call.  Thanks I will send him a message also.

## 2021-12-27 ENCOUNTER — TELEPHONE (OUTPATIENT)
Dept: NEUROSURGERY | Facility: CLINIC | Age: 71
End: 2021-12-27

## 2021-12-27 NOTE — TELEPHONE ENCOUNTER
I contacted the patient and advised that since he has an MRI he has to be seen by a doctor, I offered an alternate provider in the office and he declined.

## 2021-12-27 NOTE — TELEPHONE ENCOUNTER
Caller: Andrea Fam    Relationship to patient: Self    Best call back number: [phone]    Chief complaint: NEW PATIENT 2 OPTION    Type of visit: NEW PATIENT    Requested date: FIRST AVAILABLE     If rescheduling, when is the original appointment:  6/22/2022 WITH DR. LENNON.    Additional notes:PT IS REQUESTING TO GET A SOONER APPT WITH A APRN.  HE HAS GONE THROUGH THIS PROBLEM ON THE OTHER SIDE

## 2021-12-30 ENCOUNTER — LAB (OUTPATIENT)
Dept: ENDOCRINOLOGY | Age: 71
End: 2021-12-30

## 2021-12-30 DIAGNOSIS — E78.2 MIXED HYPERLIPIDEMIA: ICD-10-CM

## 2021-12-30 DIAGNOSIS — E03.9 ACQUIRED HYPOTHYROIDISM: ICD-10-CM

## 2021-12-30 DIAGNOSIS — E29.1 HYPOGONADISM IN MALE: ICD-10-CM

## 2021-12-30 DIAGNOSIS — E11.42 TYPE 2 DIABETES MELLITUS WITH DIABETIC POLYNEUROPATHY, WITH LONG-TERM CURRENT USE OF INSULIN: ICD-10-CM

## 2021-12-30 DIAGNOSIS — I10 ESSENTIAL HYPERTENSION: ICD-10-CM

## 2021-12-30 DIAGNOSIS — Z79.4 TYPE 2 DIABETES MELLITUS WITH DIABETIC POLYNEUROPATHY, WITH LONG-TERM CURRENT USE OF INSULIN: ICD-10-CM

## 2021-12-30 DIAGNOSIS — E55.9 VITAMIN D DEFICIENCY: ICD-10-CM

## 2022-01-07 ENCOUNTER — DOCUMENTATION (OUTPATIENT)
Dept: ENDOCRINOLOGY | Age: 72
End: 2022-01-07

## 2022-01-07 NOTE — PROGRESS NOTES
I called the pt twice and left a voice message. Also left a text message for him. Would reach out to him on Monday to reschedule.

## 2022-01-11 ENCOUNTER — OFFICE VISIT (OUTPATIENT)
Dept: ORTHOPEDIC SURGERY | Facility: CLINIC | Age: 72
End: 2022-01-11

## 2022-01-11 VITALS — HEIGHT: 69 IN | BODY MASS INDEX: 28.44 KG/M2 | WEIGHT: 192 LBS | TEMPERATURE: 97 F

## 2022-01-11 DIAGNOSIS — M48.062 SPINAL STENOSIS OF LUMBAR REGION WITH NEUROGENIC CLAUDICATION: ICD-10-CM

## 2022-01-11 DIAGNOSIS — M54.50 LUMBAR SPINE PAIN: Primary | ICD-10-CM

## 2022-01-11 DIAGNOSIS — M51.26 HERNIATED LUMBAR INTERVERTEBRAL DISC: ICD-10-CM

## 2022-01-11 PROCEDURE — 72100 X-RAY EXAM L-S SPINE 2/3 VWS: CPT | Performed by: ORTHOPAEDIC SURGERY

## 2022-01-11 PROCEDURE — 99204 OFFICE O/P NEW MOD 45 MIN: CPT | Performed by: ORTHOPAEDIC SURGERY

## 2022-01-11 RX ORDER — SEMAGLUTIDE 1.34 MG/ML
INJECTION, SOLUTION SUBCUTANEOUS
COMMUNITY
Start: 2021-11-25 | End: 2022-01-17

## 2022-01-12 ENCOUNTER — PREP FOR SURGERY (OUTPATIENT)
Dept: SURGERY | Facility: SURGERY CENTER | Age: 72
End: 2022-01-12

## 2022-01-12 DIAGNOSIS — M54.16 LUMBAR RADICULOPATHY: Primary | ICD-10-CM

## 2022-01-13 ENCOUNTER — TRANSCRIBE ORDERS (OUTPATIENT)
Dept: SURGERY | Facility: SURGERY CENTER | Age: 72
End: 2022-01-13

## 2022-01-13 ENCOUNTER — TELEMEDICINE (OUTPATIENT)
Dept: ENDOCRINOLOGY | Age: 72
End: 2022-01-13

## 2022-01-13 DIAGNOSIS — E11.42 TYPE 2 DIABETES MELLITUS WITH DIABETIC POLYNEUROPATHY, WITH LONG-TERM CURRENT USE OF INSULIN: Primary | ICD-10-CM

## 2022-01-13 DIAGNOSIS — E03.9 ACQUIRED HYPOTHYROIDISM: ICD-10-CM

## 2022-01-13 DIAGNOSIS — Z41.9 SURGERY, ELECTIVE: Primary | ICD-10-CM

## 2022-01-13 DIAGNOSIS — Z79.4 TYPE 2 DIABETES MELLITUS WITH DIABETIC POLYNEUROPATHY, WITH LONG-TERM CURRENT USE OF INSULIN: Primary | ICD-10-CM

## 2022-01-13 DIAGNOSIS — E55.9 VITAMIN D DEFICIENCY: ICD-10-CM

## 2022-01-13 DIAGNOSIS — E78.2 MIXED HYPERLIPIDEMIA: ICD-10-CM

## 2022-01-13 PROBLEM — M54.16 LUMBAR RADICULOPATHY: Status: ACTIVE | Noted: 2022-01-13

## 2022-01-13 PROCEDURE — 99214 OFFICE O/P EST MOD 30 MIN: CPT | Performed by: INTERNAL MEDICINE

## 2022-01-13 NOTE — PROGRESS NOTES
Chief Complaint  Chief Complaint   Patient presents with   • Diabetes         Subjective          History of Present Illness    Andrea Fam 71 y.o. presents with Type 2 dm as a new patient. Consulted by      Type 2 dm - Diagnosed about 10+ years ago.   Today in clinic pt reports being on metformin 1000 mg twice daily, Toujeo 28 units daily, Farxiga 5 mg oral daily.  Decrease the dosage of Farxiga due to increased urination.  Still on Ozempic as he had plenty of prescriptions, but the insurance changed Ozempic to Trulicity.  Checks BG -not like he is supposed to  Sensor -no  Dm retinopathy -no history,Last eye exam -up-to-date with eye exam  Dm nephropathy -no  Dm neuropathy -yes,Dm neuropathy meds -on Neurontin  CAD -x  CVA -x  Episodes of hypoglycemia - x  Pt is physically active. weight has been stable.   Pt tries to follow DM diet for most part.   On Ace inb.    Hypothyroidism  On levothyroxine 50 mcg oral daily    Hyperlipidemia  On Lipitor    #Patient is planning to get a steroid injection to his back.  He does know that it is going to cause the blood sugars to be elevated.     You have chosen to receive care through a telehealth visit.  Do you consent to use a video/audio connection for your medical care today? Yes    This is a video visit    I have reviewed the patient's allergies, medicines, past medical hx, family hx and social hx in detail.    Objective   Vital Signs:   There were no vitals taken for this visit.  Physical Exam   General appearance - no distress  Eyes- anicteric sclera  Ear nose and throat-external ears and nose normal.    Respiratory-normal chest on inspection.  No respiratory distress noted.  Skin-no rashes.  Neuro-alert and oriented x3            Result Review :   The following data was reviewed by: Asya Henao MD on 01/13/2022:  Results Encounter on 12/20/2021   Component Date Value Ref Range Status   • Glucose 12/30/2021 107* 65 - 99 mg/dL Final   • BUN 12/30/2021 14  8 - 27  mg/dL Final   • Creatinine 12/30/2021 1.02  0.76 - 1.27 mg/dL Final   • eGFR Non  Am 12/30/2021 73  >59 mL/min/1.73 Final   • eGFR African Am 12/30/2021 84  >59 mL/min/1.73 Final    Comment: **In accordance with recommendations from the NKF-ASN Task force,**    [facility] the [facility] eGFR calculation to the    2021 CKD-EPI creatinine equation that estimates kidney function    without a race variable.     • BUN/Creatinine Ratio 12/30/2021 14  10 - 24 Final   • Sodium 12/30/2021 141  134 - 144 mmol/L Final   • Potassium 12/30/2021 5.1  3.5 - 5.2 mmol/L Final   • Chloride 12/30/2021 101  96 - 106 mmol/L Final   • Total CO2 12/30/2021 26  20 - 29 mmol/L Final   • Calcium 12/30/2021 10.4* 8.6 - 10.2 mg/dL Final   • Total Protein 12/30/2021 6.8  6.0 - 8.5 g/dL Final   • Albumin 12/30/2021 4.5  3.7 - 4.7 g/dL Final   • Globulin 12/30/2021 2.3  1.5 - 4.5 g/dL Final   • A/G Ratio 12/30/2021 2.0  1.2 - 2.2 Final   • Total Bilirubin 12/30/2021 0.6  0.0 - 1.2 mg/dL Final   • Alkaline Phosphatase 12/30/2021 70  44 - 121 IU/L Final                  **Please note reference interval change**   • AST (SGOT) 12/30/2021 22  0 - 40 IU/L Final   • ALT (SGPT) 12/30/2021 22  0 - 44 IU/L Final   • Hemoglobin A1C 12/30/2021 7.2* 4.8 - 5.6 % Final    Comment:          Prediabetes: 5.7 - 6.4           Diabetes: >6.4           Glycemic control for adults with diabetes: <7.0     • Total Cholesterol 12/30/2021 115  100 - 199 mg/dL Final   • Triglycerides 12/30/2021 185* 0 - 149 mg/dL Final   • HDL Cholesterol 12/30/2021 33* >39 mg/dL Final   • VLDL Cholesterol Saqib 12/30/2021 31  5 - 40 mg/dL Final   • LDL Chol Calc ([facility]) 12/30/2021 51  0 - 99 mg/dL Final   • Creatinine, Urine 12/30/2021 CANCELED  mg/dL Final-Edited    Comment: Test not performed. No urine specimen received.    Result canceled by the ancillary.     • Microalbumin, Urine 12/30/2021 CANCELED   Final-Edited    Comment: Test not performed    Result canceled  by the ancillary.     • TSH 12/30/2021 1.400  0.450 - 4.500 uIU/mL Final   • Free T4 12/30/2021 1.40  0.82 - 1.77 ng/dL Final   • Interpretation 12/30/2021 Note   Final    Supplemental report is available.   • Specimen Status 12/30/2021 CANCELED   Final-Edited    Comment: Test not performed. No urine specimen received.        TEST:  [id number]  Albumin/Creatinine Ratio,Urine    Result canceled by the ancillary.       Data reviewed: PCP document       Results Review:    I reviewed the patient's new clinical results.     Assessment and Plan    Problem List Items Addressed This Visit        Other    Type 2 diabetes mellitus (HCC) - Primary    Relevant Orders    Basic Metabolic Panel    Hemoglobin A1c    Lipid Panel    TSH    T4, Free    Vitamin D 25 Hydroxy    Mixed hyperlipidemia    Relevant Orders    Basic Metabolic Panel    Hemoglobin A1c    Lipid Panel    TSH    T4, Free    Vitamin D 25 Hydroxy    Hypothyroidism    Relevant Orders    Basic Metabolic Panel    Hemoglobin A1c    Lipid Panel    TSH    T4, Free    Vitamin D 25 Hydroxy      Other Visit Diagnoses     Vitamin D deficiency         Relevant Orders    Vitamin D 25 Hydroxy        Type 2 diabetes mellitus-uncontrolled with hyperglycemia  Explained to the patient that with the epidural his sugars could go up.  Advised him to increase the dosage of Toujeo to 34 units daily in the morning if the sugars are trending up.  Continue the above medications for his diabetes.    Hyperlipidemia  Continue Lipitor 20 mg oral daily    Diabetic neuropathy  Continue Neurontin for.  Renewed the prescription for the patient.    Hypothyroidism  Continue levothyroxine.    Interpreted the blood work-up/imaging results performed by the primary care/consulting physician -    Refills sent to pharmacy    Follow Up     Patient was given instructions and counseling regarding her condition or for health maintenance advice. Please see specific information pulled into the AVS if appropriate.  "      Thank you for asking me to see your patient, Andrea Fam in consultation.         Asya Henao MD  01/13/22      EMR Dragon / transcription disclaimer:     \"Dictated utilizing Dragon dictation\".         "

## 2022-01-14 DIAGNOSIS — E11.42 TYPE 2 DIABETES MELLITUS WITH PERIPHERAL NEUROPATHY: ICD-10-CM

## 2022-01-14 NOTE — PROGRESS NOTES
New patient or new problem visit    CC: Right leg pain    HPI: He has right lower extremity hip thigh and leg pain worse with activity some low back pain.  Had previous left lower extremity pain and underwent laminectomy and 85 which helped.  He also has some diabetes and possible history of neuropathy.  Tylenol and chiropractic have not helped much.  He thinks he may have had a fusion.    PFSH: See attached    ROS: As above    PE: On exam BMI 28.4.  Good strength in the legs bilaterally.  Sensation seems grossly intact.  His gait is unremarkable.  The back is nontender.  Well-healed intra crestal and lumbosacral incision.    XRAY: Plain film x-rays show multilevel spondylosis and some levels of ankylosis hard to tell which is which.  Overall satisfactory lordosis and coronal posture.  No comparison views available.  MRI shows a right sided posterior lateral and some far lateral disc protrusion impinging L4 and five in the face of mild spinal stenosis at that level, and then L5-S1 demonstrates degenerative change hard to tell scarring from what might be new but there could be some mild recess stenosis.  He had a left-sided laminectomy.  I see no evidence of effusion.  I reviewed the radiologist report with which I agree.    Other: n/a    Impression: Lumbar herniated disc and lumbar spinal stenosis L4-5, possible contribution from five one    Plan: I recommended epidural steroid injections and he is likely to improve with these.  Dr. Jerry is graciously agreed to see him soon and I will see him back as needed.  I explained the risk of the epidurals.

## 2022-01-17 PROCEDURE — S0260 H&P FOR SURGERY: HCPCS | Performed by: ANESTHESIOLOGY

## 2022-01-18 ENCOUNTER — HOSPITAL ENCOUNTER (OUTPATIENT)
Dept: GENERAL RADIOLOGY | Facility: SURGERY CENTER | Age: 72
Setting detail: HOSPITAL OUTPATIENT SURGERY
End: 2022-01-18

## 2022-01-18 ENCOUNTER — HOSPITAL ENCOUNTER (OUTPATIENT)
Facility: SURGERY CENTER | Age: 72
Setting detail: HOSPITAL OUTPATIENT SURGERY
Discharge: HOME | End: 2022-01-18
Attending: ANESTHESIOLOGY | Admitting: ANESTHESIOLOGY

## 2022-01-18 VITALS
OXYGEN SATURATION: 95 % | DIASTOLIC BLOOD PRESSURE: 84 MMHG | TEMPERATURE: 97 F | HEART RATE: 82 BPM | SYSTOLIC BLOOD PRESSURE: 165 MMHG | RESPIRATION RATE: 20 BRPM

## 2022-01-18 DIAGNOSIS — Z41.9 SURGERY, ELECTIVE: ICD-10-CM

## 2022-01-18 PROCEDURE — 64484 NJX AA&/STRD TFRM EPI L/S EA: CPT | Performed by: ANESTHESIOLOGY

## 2022-01-18 PROCEDURE — 64483 NJX AA&/STRD TFRM EPI L/S 1: CPT | Performed by: ANESTHESIOLOGY

## 2022-01-18 PROCEDURE — 3E0R3BZ INTRODUCTION OF ANESTHETIC AGENT INTO SPINAL CANAL, PERCUTANEOUS APPROACH: ICD-10-PCS | Performed by: ANESTHESIOLOGY

## 2022-01-18 PROCEDURE — 76000 FLUOROSCOPY <1 HR PHYS/QHP: CPT

## 2022-01-18 PROCEDURE — 0 IOHEXOL 300 MG/ML SOLUTION 10 ML VIAL: Performed by: ANESTHESIOLOGY

## 2022-01-18 PROCEDURE — 77002 NEEDLE LOCALIZATION BY XRAY: CPT

## 2022-01-18 PROCEDURE — 25010000002 METHYLPREDNISOLONE PER 80 MG: Performed by: ANESTHESIOLOGY

## 2022-01-18 RX ORDER — ATORVASTATIN CALCIUM 20 MG/1
TABLET, FILM COATED ORAL
Qty: 90 TABLET | Refills: 3 | Status: SHIPPED | OUTPATIENT
Start: 2022-01-18

## 2022-01-18 NOTE — OP NOTE
LTFESI with S1 TFESI  [facility]    PREOPERATIVE DIAGNOSIS:   right Lumbar Radiculopathy    POSTOPERATIVE DIAGNOSIS: Same as preop dx    PROCEDURE:    1. 64483 --  Diagnostic  Transforaminal Epidural Steroid Injection at the  Right  L5 Level  2. 64484 -- S1 Transforaminal Epidural Steroid Injection on the Right    PRE-PROCEDURE DISCUSSION WITH PATIENT:    Risks and complications were discussed with the patient prior to starting the procedure and informed consent was obtained.    SURGEON:  iNcola Jerry MD    REASON FOR PROCEDURE:     Degenerative changes are noted in the area. and Radiating pattern of pain is likely consistent with degenerative changes in the area.    SEDATION:  Patient declined administration of moderate sedation    ANESTHETIC: Marcaine 0.25%  STEROID:     Methylprednisolone (DEPO MEDROL) 80mg/ml  TOTAL VOLUME OF SOLUTION:   4mL    DESCRIPTON OF PROCEDURE:  After obtaining informed consent, an I.V. was not started in the preoperative area. The patient taken to the operating room and was placed in the prone position with a pillow under the abdomen.  All pressure points were well padded.  EKG, blood pressure, and pulse oximeter were monitored.  The lumbosacral area was prepped with Chloraprep and draped in a sterile fashion. Under fluoroscopic guidance in an oblique dimension, the transverse process of the above mentioned Lumbar vertebra at the junction of the body at 6 o'clock position respective to the pedicle on the aforementioned side was identified. Skin and subcutaneous tissue was anesthetized with 2-3mL of 1% lidocaine. A 22-gauge spinal needle was introduced under fluoroscopic guidance at the above junction into the foramen without parasthesias and into the epidural space. After confirming the position of the needle with PA, lateral, and oblique fluoroscopic views, aspiration was checked and was clear of blood or CSF.  Next, 1 mL of contrast dye was injected. After  seeing adequate spread on the corresponding nerve root, a total volume 2 mL of injectate containing half of the previously mentioned local anesthetic solution was slowly and easily injected into the space.    The needle was removed intact.  Vital signs were stable.      Next, the S1 posterior foramen was identified on the above mentioned side with fluoroscopy in a PA dimension, and a spinal needle was introduced to just caudad to the 6 o’clock position of the foramen until contact with os; then the needle was walked off cephalad and into the foramen.   After confirming the position of the needle with PA and lateral fluoroscopic views, aspiration was checked and was clear of blood or CSF.  Next, 1 mL of contrast dye was injected. After seeing adequate spread on the S1 nerve root and into the caudal epidural space, a total volume 2mL of injectate containing the other half of the local anesthetic solution was easily and slowly injected into the epidural space.   The needle was removed intact.  Vital signs were stable.  Onset of analgesia was noted prior to transport to the recovery area.      ESTIMATED BLOOD LOSS:  <5 mL  SPECIMENS:  none    COMPLICATIONS:   No complications were noted., There was no indication of vascular uptake on live injection of contrast dye., There was no indication of intrathecal uptake on live injection of contrast dye., There was not any evidence of dural puncture.   and The patient did not have any signs of postprocedure numbness nor weakness.    TOLERANCE & DISCHARGE CONDITION:    The patient tolerated the procedure well.  The patient was transported to the recovery area without difficulties.  The patient was discharged to home under the care of family in stable and satisfactory condition.    PLAN OF CARE:  1. The patient was given our standard instruction sheet.  2. The patient will Repeat injection 2-4 wks  3. The patient will resume all medications as per the medication reconciliation  sheet.

## 2022-01-18 NOTE — DISCHARGE INSTRUCTIONS
[facility] - Post-procedure Instructions          --  While there are no absolute restrictions, it is recommended that you do not perform strenuous activity today. In the morning, you may resume your level of activity as before your block.    --  If you have a band-aid at your injection site, please remove it later today. Observe the area for any redness, swelling, pus-like drainage, or a temperature over 101°. If any of these symptoms occur, please call your doctor at [phone]. If after office hours, leave a message and the on-call provider will return your call.    --  Ice may be applied to your injection site. It is recommended you avoid direct heat (heating pad; hot tub) for 1-2 days.    --  Call [facility] at [phone] if you experience persistent headache, persistent bleeding from the injection site, or severe pain not relieved by heat or oral medication.    --  Do not make important decisions today.    --  Due to the effects of the block and/or the I.V. Sedation, DO NOT drive or operate hazardous machinery for 12 hours.  Local anesthetics may cause numbness after procedure and precautions must be taken with regards to operating equipment as well as with walking, even if ambulating with assistance of another person or with an assistive device.    --  Do not drink alcohol for 12 hours.    -- You may return to work tomorrow, or as directed by your referring doctor.    --  Occasionally you may notice a slight increase in your pain after the procedure. This should start to improve within the next 24-48 hours. Radiofrequency ablation procedure pain may last 3-4 weeks.    --  It may take as long as 3-4 days before you notice a gradual improvement in your pain and/or other symptoms.    -- You may continue to take your prescribed pain medication as needed.    --  Some normal possible side effects of steroid use could include fluid retention, increased blood sugar, dull headache,  increased sweating, increased appetite, mood swings and flushing.    --  Diabetics are recommended to watch their blood glucose level closely for 24-48 hours after the injection.    --  Must stay in PACU for 20 min upon arrival and prove no leg weakness before being discharged.    --  IN THE EVENT OF A LIFE THREATENING EMERGENCY, (CHEST PAIN, BREATHING DIFFICULTIES, PARALYSIS…) YOU SHOULD GO TO YOUR NEAREST EMERGENCY ROOM.    --  You should be contacted by our office within 2-3 days to schedule follow up or next appointment date.  If not contacted within 7 days, please call the office at [phone]

## 2022-01-18 NOTE — H&P
Brief Pre-procedural / Pre-operative H&P        -----    Pertinent Diagnosis:   Right lumbar radiculopathy.  Lumbar degenerative disc disease.  Lumbar spinal stenosis.    Proposed Procedure: Lumbar transforaminal epidural steroid injection, right-sided.      Subjective   Andrea Fam is a 71 y.o. male  who presents for intervention.  He has a history of back pain and some right leg pain symptoms.  .      History of Present Illness     Referred by Dr. Phillips and the spine surgeon noted some right lower extremity hip and thigh and leg pain that increases with activity.  He did have a laminectomy in the past.  He has multilevel spondylosis and a disc protrusion that appears to be impacting the L4 and the L5 nerve roots and there is a laminotomy noted at L5-S1 and a disc osteophyte complex that appears to be abutting on the S1 nerve roots also.    -------    The following portions of the patient's history were reviewed and updated as appropriate: allergies, current medications, past family history, past medical history, past social history, past surgical history and problem list.    Allergies   Allergen Reactions   • Lisinopril Other (See Comments)     Diplopia         No current facility-administered medications for this encounter.    No current facility-administered medications on file prior to encounter.     Current Outpatient Medications on File Prior to Encounter   Medication Sig Dispense Refill   • atorvastatin (LIPITOR) 20 MG tablet TAKE 1 TABLET DAILY 90 tablet 3   • Blood Glucose Monitoring Suppl (ONETOUCH VERIO) w/Device kit 1 Device As Needed (USE TO TO TEST BLOOD SUGAR 2 TIMES DAILY). 1 kit 0   • Dapagliflozin Propanediol (Farxiga) 10 MG tablet Take 10 mg by mouth Daily. 30 tablet 11   • Dulaglutide (Trulicity) 1.5 MG/0.5ML solution pen-injector Inject 1.5 mg under the skin into the appropriate area as directed 1 (One) Time Per Week. 12 pen 0   • FOLIC ACID PO Take 400 mcg by mouth 2 (Two) Times a Day. WILL  HOLD PRIOR TO SURGERY     • gabapentin (NEURONTIN) 300 MG capsule TAKE 3 CAPSULES IN THE MORNING AND 3 CAPSULES IN THE EVENING 540 capsule 0   • Insulin Pen Needle (NovoFine Plus) 32G X 4 MM misc Use to inject insulin 2 times daily 200 each 3   • levothyroxine (SYNTHROID, LEVOTHROID) 50 MCG tablet TAKE 1 TABLET DAILY 90 tablet 3   • metFORMIN (GLUCOPHAGE) 500 MG tablet TAKE 2 TABLETS TWICE A DAY WITH MEALS 360 tablet 3   • Multiple Vitamin (MULTI-VITAMIN DAILY PO) Take 1 tablet by mouth Daily.     • Omega-3 Fatty Acids (FISH OIL) 1000 MG capsule capsule Take 1,200 mg by mouth 2 (Two) Times a Day With Meals.     • ONETOUCH DELICA LANCETS 33G misc USE TO TEST BLOOD SUGAR 2 TIMES DAILY 100 each 5   • ONETOUCH VERIO test strip USE TO TEST BLOOD SUGAR 2 TIMES DAILY 100 each 5   • Toujeo SoloStar 300 UNIT/ML solution pen-injector injection INJECT 22 UNITS UNDER THE SKIN EVERY NIGHT 13.5 mL 4   • vitamin B-12 (CYANOCOBALAMIN) 1000 MCG tablet Take 1,000 mcg by mouth Every Other Day. TAKES ON ODD NUMBER DAYS  WILL HOLD PRIOR TO SURGERY     • vitamin C (ASCORBIC ACID) 500 MG tablet Take 1,000 mg by mouth Daily. PT TO HOLD PRIOR TO SURGERY     • Cholecalciferol (VITAMIN D) 2000 UNITS tablet Take 2,000 Units by mouth 2 (Two) Times a Day.         Patient Active Problem List   Diagnosis   • Rotator cuff insufficiency of right shoulder   • Type 2 diabetes mellitus (HCC)   • Impotence of organic origin   • Peripheral nerve disease   • Sacral pain   • Hypogonadism in male   • LISANDRA (obstructive sleep apnea) on auto CPAP   • Weak urinary stream   • Mixed hyperlipidemia   • Encounter for laboratory testing for COVID-19 virus   • HTN (hypertension)   • Hypothyroidism   • Septic shock (HCC)   • Severe sepsis (HCC)   • Lumbar radiculopathy       Past Medical History:   Diagnosis Date   • Arthritis    • High cholesterol    • History of recent fall    • Hypothyroidism    • Obstructive sleep apnea on CPAP    • Peripheral neuropathy      FINGERS AND LOWER LEGS   • Quadriceps muscle rupture     RIGHT   • Quadriceps muscle rupture 2020   • Sepsis (HCC) 2006    FROM UTI, HOSPITALIZED   • Testosterone deficiency    • Type 2 diabetes mellitus (HCC)    • UTI (urinary tract infection) 2020       Past Surgical History:   Procedure Laterality Date   • BACK SURGERY      L5   • CATARACT EXTRACTION EXTRACAPSULAR W/ INTRAOCULAR LENS IMPLANTATION Bilateral    • QUADRICEPS TENDON REPAIR Right 2020    Procedure: RIGHT OPEN  QUADRICEPS TENDON REPAIR;  Surgeon: Bahman Ordonez II, MD;  Location: [facility];  Service: Orthopedics;  Laterality: Right;   • ROTATOR CUFF REPAIR Left    • SEPTOPLASTY     • TOTAL SHOULDER ARTHROPLASTY W/ DISTAL CLAVICLE EXCISION Right 2017    Procedure: RT TOTAL SHOULDER REVERSE ARTHROPLASTY;  Surgeon: Jamal Prince MD;  Location: [facility];  Service:        Family History   Problem Relation Age of Onset   • Alzheimer's disease Mother    • Lymphoma Paternal Grandmother    • Cerebral aneurysm Paternal Grandfather    • Colon cancer Neg Hx    • Malig Hyperthermia Neg Hx        Social History     Socioeconomic History   • Marital status:    Tobacco Use   • Smoking status: Former Smoker     Packs/day: 1.50     Years: 30.00     Pack years: 45.00     Types: Cigarettes     Quit date:      Years since quittin.0   • Smokeless tobacco: Never Used   • Tobacco comment: QUIT AGE 48   Substance and Sexual Activity   • Alcohol use: Yes     Comment: 2  drinks per  month   • Drug use: No   • Sexual activity: Never       -------       Review of Systems  No Fever, No Chills, No ear pain, No sinus pressure or drainage, No eye pain or drainage, No cough, No SOB, No chest tightness nor chest pain, no palpitations.          Vitals:    22 1445   BP: 147/89   BP Location: Left arm   Patient Position: Lying   Pulse: 84   Resp: 16   Temp: 97.3 °F (36.3 °C)   TempSrc: Infrared   SpO2: 96%         Objective    Physical Exam  VSS, NNR, NCAT, NMNA, NRD, AAOx3.      -------    Assessment & Plan:  - as noted above, the stated intervention is indicated  - Follow-up plan will be noted in the operative report        Plan is to repeat in 2 weeks unless all better.      EMR Dragon/Transcription disclaimer:   Typed items in this encounter note may have been created by electronic transcription/translation software which converts spoken language to printed text. The electronic translation of spoken language may permit erroneous, or at times, nonsensical words or phrases to be inadvertently transcribed; Although I have reviewed the note for such errors, some may still exist.

## 2022-01-20 RX ORDER — GABAPENTIN 300 MG/1
CAPSULE ORAL
Qty: 540 CAPSULE | Refills: 0 | Status: SHIPPED | OUTPATIENT
Start: 2022-01-20 | End: 2022-05-05

## 2022-01-24 ENCOUNTER — TRANSCRIBE ORDERS (OUTPATIENT)
Dept: SURGERY | Facility: SURGERY CENTER | Age: 72
End: 2022-01-24

## 2022-01-24 DIAGNOSIS — Z41.9 SURGERY, ELECTIVE: Primary | ICD-10-CM

## 2022-02-04 NOTE — TELEPHONE ENCOUNTER
SPOKE WITH PATIENT     PRESCRIPTION FOR CONTROLLED SUBSTANCE HAS BEEN CALLED INTO PATIENT PHARMACY LISTED IN CHART

## 2022-02-15 ENCOUNTER — HOSPITAL ENCOUNTER (OUTPATIENT)
Facility: SURGERY CENTER | Age: 72
Setting detail: HOSPITAL OUTPATIENT SURGERY
Discharge: HOME | End: 2022-02-15
Attending: ANESTHESIOLOGY | Admitting: ANESTHESIOLOGY

## 2022-02-15 ENCOUNTER — HOSPITAL ENCOUNTER (OUTPATIENT)
Dept: GENERAL RADIOLOGY | Facility: SURGERY CENTER | Age: 72
Setting detail: HOSPITAL OUTPATIENT SURGERY
End: 2022-02-15

## 2022-02-15 VITALS
SYSTOLIC BLOOD PRESSURE: 135 MMHG | OXYGEN SATURATION: 96 % | TEMPERATURE: 97.7 F | HEART RATE: 81 BPM | RESPIRATION RATE: 16 BRPM | BODY MASS INDEX: 28.35 KG/M2 | DIASTOLIC BLOOD PRESSURE: 74 MMHG | HEIGHT: 69 IN

## 2022-02-15 DIAGNOSIS — Z41.9 SURGERY, ELECTIVE: ICD-10-CM

## 2022-02-15 PROCEDURE — 25010000002 METHYLPREDNISOLONE PER 80 MG: Performed by: ANESTHESIOLOGY

## 2022-02-15 PROCEDURE — 3E0R3BZ INTRODUCTION OF ANESTHETIC AGENT INTO SPINAL CANAL, PERCUTANEOUS APPROACH: ICD-10-PCS | Performed by: ANESTHESIOLOGY

## 2022-02-15 PROCEDURE — 76000 FLUOROSCOPY <1 HR PHYS/QHP: CPT

## 2022-02-15 PROCEDURE — 64483 NJX AA&/STRD TFRM EPI L/S 1: CPT | Performed by: ANESTHESIOLOGY

## 2022-02-15 PROCEDURE — 0 IOHEXOL 300 MG/ML SOLUTION 10 ML VIAL: Performed by: ANESTHESIOLOGY

## 2022-02-15 PROCEDURE — 64484 NJX AA&/STRD TFRM EPI L/S EA: CPT | Performed by: ANESTHESIOLOGY

## 2022-02-15 PROCEDURE — 77002 NEEDLE LOCALIZATION BY XRAY: CPT

## 2022-02-15 NOTE — INTERVAL H&P NOTE
H&P updated. The patient was examined and he had partial diagnostic effect previously of 50% so we are changing approaches.

## 2022-02-15 NOTE — DISCHARGE INSTRUCTIONS
[facility] - Post-procedure Instructions          --  While there are no absolute restrictions, it is recommended that you do not perform strenuous activity today. In the morning, you may resume your level of activity as before your block.    --  If you have a band-aid at your injection site, please remove it later today. Observe the area for any redness, swelling, pus-like drainage, or a temperature over 101°. If any of these symptoms occur, please call your doctor at [phone]. If after office hours, leave a message and the on-call provider will return your call.    --  Ice may be applied to your injection site. It is recommended you avoid direct heat (heating pad; hot tub) for 1-2 days.    --  Call [facility] at [phone] if you experience persistent headache, persistent bleeding from the injection site, or severe pain not relieved by heat or oral medication.    --  Do not make important decisions today.    --  Due to the effects of the block and/or the I.V. Sedation, DO NOT drive or operate hazardous machinery for 12 hours.  Local anesthetics may cause numbness after procedure and precautions must be taken with regards to operating equipment as well as with walking, even if ambulating with assistance of another person or with an assistive device.    --  Do not drink alcohol for 12 hours.    -- You may return to work tomorrow, or as directed by your referring doctor.    --  Occasionally you may notice a slight increase in your pain after the procedure. This should start to improve within the next 24-48 hours. Radiofrequency ablation procedure pain may last 3-4 weeks.    --  It may take as long as 3-4 days before you notice a gradual improvement in your pain and/or other symptoms.    -- You may continue to take your prescribed pain medication as needed.    --  Some normal possible side effects of steroid use could include fluid retention, increased blood sugar, dull headache,  increased sweating, increased appetite, mood swings and flushing.    --  Diabetics are recommended to watch their blood glucose level closely for 24-48 hours after the injection.    --  Must stay in PACU for 20 min upon arrival and prove no leg weakness before being discharged.    --  IN THE EVENT OF A LIFE THREATENING EMERGENCY, (CHEST PAIN, BREATHING DIFFICULTIES, PARALYSIS…) YOU SHOULD GO TO YOUR NEAREST EMERGENCY ROOM.    --  You should be contacted by our office within 2-3 days to schedule follow up or next appointment date.  If not contacted within 7 days, please call the office at [phone]

## 2022-02-15 NOTE — OP NOTE
Lumbar Transforaminal Epidural Steroid Injection (two levels)  [facility]      PREOPERATIVE DIAGNOSIS:  right Lumbar Radiculopathy    POSTOPERATIVE DIAGNOSIS:  Same as preop diagnosis    PROCEDURE:    1. CPT 84881 --  Diagnostic Transforaminal Epidural Steroid Injection at the L4 level, on the right   2. CPT 68460 --  Diagnostic Transforaminal Epidural Steroid Injection at the L5 level, on the right     PRE-PROCEDURE DISCUSSION WITH PATIENT:    Risks and complications were discussed with the patient prior to starting the procedure and informed consent was obtained.  We discussed various topics including but not limited to bleeding, infection, injury, nerve injury, paralysis, coma, death, postprocedural painful flare-up, postprocedural site soreness, and a lack of pain relief.  We discussed the diagnostic aspect of transforaminal epidural / selective nerve root blockade.    SURGEON:  Nicola Jerry MD    REASON FOR PROCEDURE:    Despite some diagnostic positivity at the particular procedure, some modifcation of approach is reasonable to determine whether or not more relief might be achieved., Degenerative changes are noted in the area. and Radiating pattern of pain is likely consistent with degenerative changes in the area.    SEDATION:  Patient declined administration of moderate sedation    ANESTHETIC:  Marcaine 0.25%  STEROID:  Methylprednisolone (DEPO MEDROL) 80mg/ml    DESCRIPTON OF PROCEDURE:  After obtaining informed consent, an I.V. was not started in the preoperative area. The patient taken to the operating room and was placed in the prone position with a pillow under the abdomen.  All pressure points were well padded.  EKG, blood pressure, and pulse oximeter were monitored.  The lumbar area was prepped with Chloraprep and draped in a sterile fashion. Under fluoroscopic guidance in an oblique dimension on the above mentioned side, the transverse process of the first aforementioned  vertebra at the junction of the body at 6 o'clock position was identified. Skin and subcutaneous tissue was anesthetized with 1% lidocaine. A 22-gauge spinal needle was introduced under fluoroscopic guidance at the above junction into the foramen without parasthesias and into the epidural space. After confirming the position of the needle with PA, lateral, and oblique fluoroscopic views, aspiration was checked and was clear of blood or CSF.  Next, 1 mL of Omnipaque was injected. After seeing adequate spread on the corresponding nerve root, a total volume 2mL of injectate containing local anesthetic as above and half of the above mentioned corticosteroid was injected into the epidural space.  The needle was removed intact.      Next, in similar fashion, the second level mentioned above was addressed and a similar amount of injectate was delivered after similar imaging was achieved.      Vital signs were stable throughout.          ESTIMATED BLOOD LOSS:  <5 mL  SPECIMENS:  none    COMPLICATIONS:   No complications were noted., There was no indication of vascular uptake on live injection of contrast dye., There was no indication of intrathecal uptake on live injection of contrast dye., There was not any evidence of dural puncture.   and The patient did not have any signs of postprocedure numbness nor weakness.    TOLERANCE & DISCHARGE CONDITION:    The patient tolerated the procedure well.  The patient was transported to the recovery area without difficulties.  The patient was discharged to home under the care of family in stable and satisfactory condition.    PLAN OF CARE:  1. The patient was given our standard instruction sheet.  2. The patient will Return to clinic 3-4 wks.  3. The patient will resume all medications as per the medication reconciliation sheet.

## 2022-02-28 ENCOUNTER — TELEPHONE (OUTPATIENT)
Dept: ORTHOPEDIC SURGERY | Facility: CLINIC | Age: 72
End: 2022-02-28

## 2022-02-28 ENCOUNTER — TELEPHONE (OUTPATIENT)
Dept: PAIN MEDICINE | Facility: CLINIC | Age: 72
End: 2022-02-28

## 2022-02-28 NOTE — TELEPHONE ENCOUNTER
Caller: LAURIE PENA    Relationship: PATIENT    Best call back number: [phone]  What form or medical record are you requesting: RECORDS    Who is requesting this form or medical record from you: DR HENDRICKSON[facility]S OFFICE     How would you like to receive the form or medical records (pick-up, mail, fax): FAX  If fax, what is the fax number: [phone]    Timeframe paperwork needed: HAS APPT 3/17/22

## 2022-02-28 NOTE — TELEPHONE ENCOUNTER
Caller: LAURIE PENA    Relationship: PATIENT    Best call back number: [phone]  What form or medical record are you requesting: RECORDS    Who is requesting this form or medical record from you: DR HENDRICKSON'S OFFICE     How would you like to receive the form or medical records (pick-up, mail, fax): FAX  If fax, what is the fax number: [phone]    Timeframe paperwork needed: HAS APPT 3/17/22

## 2022-03-10 ENCOUNTER — OFFICE VISIT (OUTPATIENT)
Dept: PAIN MEDICINE | Facility: CLINIC | Age: 72
End: 2022-03-10

## 2022-03-10 VITALS
RESPIRATION RATE: 12 BRPM | HEART RATE: 94 BPM | BODY MASS INDEX: 28.38 KG/M2 | TEMPERATURE: 96.8 F | HEIGHT: 69 IN | WEIGHT: 191.6 LBS | OXYGEN SATURATION: 97 % | SYSTOLIC BLOOD PRESSURE: 130 MMHG | DIASTOLIC BLOOD PRESSURE: 85 MMHG

## 2022-03-10 DIAGNOSIS — M54.16 LUMBAR RADICULOPATHY: Primary | ICD-10-CM

## 2022-03-10 PROCEDURE — 99214 OFFICE O/P EST MOD 30 MIN: CPT | Performed by: ANESTHESIOLOGY

## 2022-03-10 RX ORDER — DULOXETIN HYDROCHLORIDE 60 MG/1
60 CAPSULE, DELAYED RELEASE ORAL DAILY
Qty: 90 CAPSULE | Refills: 0 | Status: SHIPPED | OUTPATIENT
Start: 2022-03-10 | End: 2022-05-16

## 2022-03-10 NOTE — PATIENT INSTRUCTIONS
----------  Education about SCS therapy:    -  This was an extended office visit in which we entered into discussion about advanced pain relieving techniques, and discussed implantable pain therapies.  We discussed advanced neuromodulation in the form of Spinal Cord Stimulation.  This is a reasonable therapy for patients who have exhausted basic nonnarcotic options, basic modalities and physical therapies, and do not have any other reasonable surgical options.  This therapy as an alternative to long term high dose opioid therapy.    -  Risks include but are not limited to bleeding, infection, injury, paralysis, nerve injury, dural puncture, and risk for postprocedural pain.  Implanted equipment risks include but are not limited to lead migration, lead fracture, risk of loss of pain relieving stimulation, risk of electrical shock, and risk of system failure.    - We discussed the theory and basic science behind SCS therapy including but not limited to energy delivery and relevant anatomy, in terms that are easy to understand and also with use of illustrative devices.  Spinal Cord Stimulation therapies apply an electromagnetic field to a specific area on the spinal cord (Dorsal Column) to attempt to block transmission of painful signals from the peripheral nerves to the brain.    -  We discussed that prior to trialing, I request that patients review relevant materials and perform some research, and also have a follow up education session with a device specialist from the .  Also, insurance requires a presurgical psychological evaluation.  When these have been completed, and all the patient's questions have been answered to their satisfaction, then we will plan to request authorization for trialing.   - We discussed the trialing process (aka Phase 1)  that usually lasts a week, and the temporary nature of this trial.  Trial success will determine whether or not we proceed to implant.  We discussed  reasonable expectations, and that I feel that consistent 50% pain relief is medically successful and is a reasonable expectation to justify moving forward to permanent implant.    -  Additional risks of Phase 1 include but are not limited to bleeding on insertion, bleeding on lead removal, and procedural site soreness.  - We discussed the percutaneous surgical implant, including postsurgical restrictions, risks, and alternatives.   For spinal cord stimulation implanted device (aka SCS Phase 2) there is usually a midline vertical incision for the spinally implanted leads, and also a horizontal incision in the posterior lumbar flank for implantation of the battery & computer (aka IPG).  The leads are tunneled from the midline incision to the medial aspect of the battery pocket incision.    -  Postoperative restrictions include limiting the following activity as much as possible for 90 days:  Lifting >10 lbs, bending at the waist, stretching/reaching overhead, and twisting.  ----------

## 2022-03-10 NOTE — PROGRESS NOTES
CHIEF COMPLAINT    PROCEDURE FOLLOW UP LUMBAR/SACRAL TRANSFORAMINAL EPIDURAL - likely right L5 plus right L4 versus right S1.... x2, 3-4 weeks apart.    Pt in office F/U visit   epidural , states he did not gained any relief , nor did he with the  previous one .     Subjective   Andrea Fam is a 72 y.o. male  who presents to the office for follow-up of procedure.  He completed a right L4 and L5 lumbar transforaminal epidural injection   on January 18 and February 15 of this year performed by Dr. Jerry for management of pain but especially the right lower extremity radicular pain. Patient reports no sustained relief from the procedure.  He has previously reported that there was some moderate effect and I documented some diagnostic benefit previously before completing the second injection.  After this second injection he does not recall any immediate diagnostic benefit.  He did not have any significant sustained benefit, with perhaps only 20 to 30% for 1 week.      Patient remained masked during entire encounter. No cough present. I donned a mask and eye protection throughout entire visit. Prior to donning mask and eye protection, hand hygiene was performed, as well as when it was doffed.  I was closer than 6 feet, but not for an extended period of time. No obvious exposure to any bodily fluids.      Back Pain  This is a chronic problem. Episode onset: 9 months. The problem has been gradually worsening since onset. The pain is present in the lumbar spine. The quality of the pain is described as burning and shooting (L5 dermatome for the leg). The pain radiates to the right foot and right thigh (Right calf especially lateral aspect.  There is some shooting to that right foot.  He does have some noted peripheral neuropathy also a dermatome wrapping into the foot is difficult but he does find some more pain into the lateral aspect of the foot ). The pain is moderate. The symptoms are aggravated by position, standing  and twisting. Associated symptoms include numbness (hands and feet) and weakness. Pertinent negatives include no abdominal pain, chest pain, dysuria, fever or headaches. He has tried analgesics, bed rest, heat, ice and home exercises for the symptoms.        PEG Assessment   What number best describes your pain on average in the past week?8  What number best describes how, during the past week, pain has interfered with your enjoyment of life?8  What number best describes how, during the past week, pain has interfered with your general activity?  8    --  The aforementioned information the Chief Complaint section and above subjective data including any HPI data, and also the Review of Systems data, has been personally reviewed and affirmed.  --      Review of Pertinent Medical Data ---  E-rasheed report is reviewed:  I reviewed the document in the electronic form under the PDMP tab in the Epic EMR...  - In this function, the report is a current report in as close to real-time as possible.  - The report was available for immediate review.    - I did hanna the report as reviewed.  - On review, none of the prescriptions in the past year have come from our practice. There is not concern for aberrant behavior based on this ekasper review.  -- gabapentin managed by Dr. Fermin    The following portions of the patient's history were reviewed and updated as appropriate: allergies, current medications, past family history, past medical history, past social history, past surgical history and problem list.    -------    The following portions of the patient's history were reviewed and updated as appropriate: allergies, current medications, past family history, past medical history, past social history, past surgical history and problem list.    Allergies   Allergen Reactions   • Lisinopril Other (See Comments)     Diplopia           Current Outpatient Medications:   •  atorvastatin (LIPITOR) 20 MG tablet, TAKE 1 TABLET DAILY, Disp:  90 tablet, Rfl: 3  •  Blood Glucose Monitoring Suppl (ONETOUCH VERIO) w/Device kit, 1 Device As Needed (USE TO TO TEST BLOOD SUGAR 2 TIMES DAILY)., Disp: 1 kit, Rfl: 0  •  Cholecalciferol (VITAMIN D) 2000 UNITS tablet, Take 2,000 Units by mouth 2 (Two) Times a Day., Disp: , Rfl:   •  Dapagliflozin Propanediol (Farxiga) 10 MG tablet, Take 10 mg by mouth Daily., Disp: 30 tablet, Rfl: 11  •  Dulaglutide (Trulicity) 1.5 MG/0.5ML solution pen-injector, Inject 1.5 mg under the skin into the appropriate area as directed 1 (One) Time Per Week., Disp: 12 pen, Rfl: 0  •  FOLIC ACID PO, Take 400 mcg by mouth 2 (Two) Times a Day. WILL HOLD PRIOR TO SURGERY, Disp: , Rfl:   •  gabapentin (NEURONTIN) 300 MG capsule, TAKE 3 CAPSULES IN THE MORNING AND 3 CAPSULES IN THE EVENING, Disp: 540 capsule, Rfl: 0  •  Insulin Pen Needle (NovoFine Plus) 32G X 4 MM misc, Use to inject insulin 2 times daily, Disp: 200 each, Rfl: 3  •  levothyroxine (SYNTHROID, LEVOTHROID) 50 MCG tablet, TAKE 1 TABLET DAILY, Disp: 90 tablet, Rfl: 3  •  metFORMIN (GLUCOPHAGE) 500 MG tablet, TAKE 2 TABLETS TWICE A DAY WITH MEALS, Disp: 360 tablet, Rfl: 3  •  Multiple Vitamin (MULTI-VITAMIN DAILY PO), Take 1 tablet by mouth Daily., Disp: , Rfl:   •  Omega-3 Fatty Acids (FISH OIL) 1000 MG capsule capsule, Take 1,200 mg by mouth 2 (Two) Times a Day With Meals., Disp: , Rfl:   •  ONETOUCH DELICA LANCETS 33G misc, USE TO TEST BLOOD SUGAR 2 TIMES DAILY, Disp: 100 each, Rfl: 5  •  ONETOUCH VERIO test strip, USE TO TEST BLOOD SUGAR 2 TIMES DAILY, Disp: 100 each, Rfl: 5  •  Toujeo SoloStar 300 UNIT/ML solution pen-injector injection, INJECT 22 UNITS UNDER THE SKIN EVERY NIGHT, Disp: 13.5 mL, Rfl: 4  •  vitamin B-12 (CYANOCOBALAMIN) 1000 MCG tablet, Take 1,000 mcg by mouth Every Other Day. TAKES ON ODD NUMBER DAYS WILL HOLD PRIOR TO SURGERY, Disp: , Rfl:   •  vitamin C (ASCORBIC ACID) 500 MG tablet, Take 1,000 mg by mouth Daily. PT TO HOLD PRIOR TO SURGERY, Disp: , Rfl:   •   DULoxetine (CYMBALTA) 60 MG capsule, Take 1 capsule by mouth Daily., Disp: 90 capsule, Rfl: 0    Current Outpatient Medications on File Prior to Visit   Medication Sig Dispense Refill   • atorvastatin (LIPITOR) 20 MG tablet TAKE 1 TABLET DAILY 90 tablet 3   • Blood Glucose Monitoring Suppl (ONETOUCH VERIO) w/Device kit 1 Device As Needed (USE TO TO TEST BLOOD SUGAR 2 TIMES DAILY). 1 kit 0   • Cholecalciferol (VITAMIN D) 2000 UNITS tablet Take 2,000 Units by mouth 2 (Two) Times a Day.     • Dapagliflozin Propanediol (Farxiga) 10 MG tablet Take 10 mg by mouth Daily. 30 tablet 11   • Dulaglutide (Trulicity) 1.5 MG/0.5ML solution pen-injector Inject 1.5 mg under the skin into the appropriate area as directed 1 (One) Time Per Week. 12 pen 0   • FOLIC ACID PO Take 400 mcg by mouth 2 (Two) Times a Day. WILL HOLD PRIOR TO SURGERY     • gabapentin (NEURONTIN) 300 MG capsule TAKE 3 CAPSULES IN THE MORNING AND 3 CAPSULES IN THE EVENING 540 capsule 0   • Insulin Pen Needle (NovoFine Plus) 32G X 4 MM misc Use to inject insulin 2 times daily 200 each 3   • levothyroxine (SYNTHROID, LEVOTHROID) 50 MCG tablet TAKE 1 TABLET DAILY 90 tablet 3   • metFORMIN (GLUCOPHAGE) 500 MG tablet TAKE 2 TABLETS TWICE A DAY WITH MEALS 360 tablet 3   • Multiple Vitamin (MULTI-VITAMIN DAILY PO) Take 1 tablet by mouth Daily.     • Omega-3 Fatty Acids (FISH OIL) 1000 MG capsule capsule Take 1,200 mg by mouth 2 (Two) Times a Day With Meals.     • ONETOUCH DELICA LANCETS 33G misc USE TO TEST BLOOD SUGAR 2 TIMES DAILY 100 each 5   • ONETOUCH VERIO test strip USE TO TEST BLOOD SUGAR 2 TIMES DAILY 100 each 5   • Toujeo SoloStar 300 UNIT/ML solution pen-injector injection INJECT 22 UNITS UNDER THE SKIN EVERY NIGHT 13.5 mL 4   • vitamin B-12 (CYANOCOBALAMIN) 1000 MCG tablet Take 1,000 mcg by mouth Every Other Day. TAKES ON ODD NUMBER DAYS  WILL HOLD PRIOR TO SURGERY     • vitamin C (ASCORBIC ACID) 500 MG tablet Take 1,000 mg by mouth Daily. PT TO HOLD PRIOR  TO SURGERY       No current facility-administered medications on file prior to visit.       Patient Active Problem List   Diagnosis   • Rotator cuff insufficiency of right shoulder   • Type 2 diabetes mellitus (HCC)   • Impotence of organic origin   • Peripheral nerve disease   • Sacral pain   • Hypogonadism in male   • LISANDRA (obstructive sleep apnea) on auto CPAP   • Weak urinary stream   • Mixed hyperlipidemia   • Encounter for laboratory testing for COVID-19 virus   • HTN (hypertension)   • Hypothyroidism   • Septic shock (HCC)   • Severe sepsis (HCC)   • Lumbar radiculopathy       Past Medical History:   Diagnosis Date   • Arthritis    • High cholesterol    • History of recent fall    • Hypothyroidism    • Obstructive sleep apnea on CPAP    • Peripheral neuropathy     FINGERS AND LOWER LEGS   • Quadriceps muscle rupture     RIGHT   • Quadriceps muscle rupture 08/2020   • Sepsis (HCC) 07/2006    FROM UTI, HOSPITALIZED   • Testosterone deficiency    • Type 2 diabetes mellitus (HCC)    • UTI (urinary tract infection) 07/05/2020       Past Surgical History:   Procedure Laterality Date   • BACK SURGERY      L5   • CATARACT EXTRACTION EXTRACAPSULAR W/ INTRAOCULAR LENS IMPLANTATION Bilateral    • EPIDURAL Right 1/18/2022    Procedure: LUMBAR/SACRAL TRANSFORAMINAL EPIDURAL - likely right L5 plus right L4 versus right S1.... x2, 3-4 weeks apart;  Surgeon: Nicola Jerry MD;  [place] [facility] [place];  Service: Pain Management;  Laterality: Right;   • EPIDURAL Right 2/15/2022    Procedure: LUMBAR/SACRAL TRANSFORAMINAL EPIDURAL - likely right L5 plus right L4 versus right S1.... x2, 3-4 weeks apart;  Surgeon: Nicola Jerry MD;  [place] [facility] [place];  Service: Pain Management;  Laterality: Right;   • QUADRICEPS TENDON REPAIR Right 8/4/2020    Procedure: RIGHT OPEN  QUADRICEPS TENDON REPAIR;  Surgeon: Bahman Ordonez II, MD;  Location[facility] MAIN OR;  Service: Orthopedics;  Laterality: Right;   • ROTATOR  CUFF REPAIR Left    • SEPTOPLASTY     • TOTAL SHOULDER ARTHROPLASTY W/ DISTAL CLAVICLE EXCISION Right 2017    Procedure: RT TOTAL SHOULDER REVERSE ARTHROPLASTY;  Surgeon: Jamal Prince MD;  Location: [facility] [place];  Service:        Family History   Problem Relation Age of Onset   • Alzheimer's disease Mother    • Lymphoma Paternal Grandmother    • Cerebral aneurysm Paternal Grandfather    • Colon cancer Neg Hx    • Malig Hyperthermia Neg Hx        Social History     Socioeconomic History   • Marital status:    Tobacco Use   • Smoking status: Former Smoker     Packs/day: 1.50     Years: 30.00     Pack years: 45.00     Types: Cigarettes     Quit date:      Years since quittin.2   • Smokeless tobacco: Never Used   • Tobacco comment: QUIT AGE 48   Vaping Use   • Vaping Use: Never used   Substance and Sexual Activity   • Alcohol use: Yes     Comment: 2  drinks per  month   • Drug use: No   • Sexual activity: Never       -------        Review of Systems   Constitutional: Negative for activity change, fatigue and fever.   HENT: Negative for congestion.    Eyes: Negative for visual disturbance.   Respiratory: Negative for cough and chest tightness.    Cardiovascular: Negative for chest pain.   Gastrointestinal: Negative for abdominal pain, constipation and diarrhea.   Genitourinary: Negative for difficulty urinating and dysuria.   Musculoskeletal: Positive for back pain.   Neurological: Positive for weakness and numbness (hands and feet). Negative for dizziness, light-headedness and headaches.   Psychiatric/Behavioral: Negative for agitation, sleep disturbance and suicidal ideas. The patient is not nervous/anxious.      This fluoroscopic images from his second transforaminal injection procedure.  He had positive paresthesia on the injection of the contrast dye, which was easy injection and was no indication of intraneural injection, reproducing pain and it is also my impression that this right L5  "nerve root appears narrowed on the neurogram/epidurogram       Vitals:    03/10/22 1249   BP: 130/85   BP Location: Right arm   Patient Position: Sitting   Pulse: 94   Resp: 12   Temp: 96.8 °F (36 °C)   SpO2: 97%   Weight: 86.9 kg (191 lb 9.6 oz)   Height: 175.3 cm (69\")   PainSc:   8   PainLoc: Back         Objective   Physical Exam  Vitals and nursing note reviewed.   Constitutional:       General: He is not in acute distress.     Appearance: Normal appearance. He is well-developed. He is not toxic-appearing.   HENT:      Head: Normocephalic and atraumatic.      Right Ear: Hearing and external ear normal.      Left Ear: Hearing and external ear normal.      Nose: Nose normal.   Eyes:      General: Lids are normal.      Conjunctiva/sclera: Conjunctivae normal.      Pupils: Pupils are equal, round, and reactive to light.   Pulmonary:      Effort: Pulmonary effort is normal. No respiratory distress.   Musculoskeletal:      Lumbar back: No tenderness or bony tenderness. Decreased range of motion. Positive right straight leg raise test.   Neurological:      Mental Status: He is alert and oriented to person, place, and time.      Cranial Nerves: No cranial nerve deficit.      Motor: Motor function is intact.      Gait: Gait is intact.      Deep Tendon Reflexes:      Reflex Scores:       Patellar reflexes are 1+ on the right side and 1+ on the left side.       Achilles reflexes are 1+ on the right side and 1+ on the left side.  Psychiatric:         Behavior: Behavior normal.             Assessment/Plan   Diagnoses and all orders for this visit:    1. Lumbar radiculopathy (Primary)  -     Ambulatory Referral to Physical Therapy Evaluate and treat; Stretching, Strengthening, ROM    Other orders  -     DULoxetine (CYMBALTA) 60 MG capsule; Take 1 capsule by mouth Daily.  Dispense: 90 capsule; Refill: 0      In summary, this 72-year-old gentleman was referred to us from spine surgeon Dr. Phillips, and my colleague for the " [facility] had requested consideration for epidural injection for the radicular symptoms.  Unfortunately after 2 epidural injections he is not improving.    Today is his first visit in the office, following up after these epidural injections.    Of note, his wife and made him an appointment at [facility] and he is going to see my colleague Dr. Ken Bhatia.  I am supportive and think given his description of significant suffering that for him to get multiple opinions or affirmations is just fine at this point    This was a very extended office visit 32 minutes in duration.  We spent 20 minutes in education and counseling.  We talked about overall treatment paradigms and also talked about differential diagnoses, and talked about multimodal therapies.    I do not think that his pain and suffering are consistent with a facet mediated pain nor a sacroiliac mediated pain.  It seems to be classically radicular.  I do not think that facet radiofrequency ablation or sacroiliac ablation is indicated at this time.    We talked about the nature of chronic pain and reasonable expectations.  We talked about reasonable expectations of potential surgical decompression or repair options and also the nature of of chronic neuropathic pain that decompression surgeries are to prevent further damage and may or may not completely restore function or eliminate pain.    I sought to try to maximize his multimodal regimen.  We talked about some neurophysiology in the descending inhibitory pathways and trying to maximize that endogenous pain relief, and other mechanisms to target pharmacologically besides use of gabapentin.  Therefore duloxetine was started.  He also has not fully engaged in physical therapy although he has had some attempts at home exercise regimen.  Physical therapy was ordered.    Also spent some time introducing him to spinal cord stimulation and gave him some education  materials.    --- Follow-up 3 months or sooner if needed               YECENIA REPORT  YECENIA report has been reviewed and scanned into the patient's chart.  Date of last YECENIA : as above.  No current use of opioids.           Dictated utilizing Dragon dictation.      This document is intended for medical expert use only. Reading of this document by patients and/or patient's family without participating medical staff guidance may result in misinterpretation and unintended morbidity.   Any interpretation of such data is the responsibility of the patient and/or family member responsible for the patient in concert with their primary or specialist providers, not to be left for sources of online searches such as "[employer] or similar queries. Relying on these approaches to knowledge may result in misinterpretation, misguided goals of care and even death should patients or family members try recommendations outside of the realm of professional medical care in a supervised way.      Vitals:    03/10/22 1249   PainSc:   8   PainLoc: Back          Andrea Fam reports a pain score of 8.  Given his pain assessment as noted, treatment options were discussed and the following options were decided upon as a follow-up plan to address the patient's pain: continuation of current treatment plan for pain, home exercises and therapy, prescription for non-opiod analgesics and referral to Physical Therapy.

## 2022-03-15 ENCOUNTER — TREATMENT (OUTPATIENT)
Dept: PHYSICAL THERAPY | Facility: CLINIC | Age: 72
End: 2022-03-15

## 2022-03-15 DIAGNOSIS — M25.559 PAIN IN HIP: ICD-10-CM

## 2022-03-15 DIAGNOSIS — M54.16 LUMBAR RADICULOPATHY: Primary | ICD-10-CM

## 2022-03-15 DIAGNOSIS — M54.41 CHRONIC BILATERAL LOW BACK PAIN WITH RIGHT-SIDED SCIATICA: ICD-10-CM

## 2022-03-15 DIAGNOSIS — G89.29 CHRONIC BILATERAL LOW BACK PAIN WITH RIGHT-SIDED SCIATICA: ICD-10-CM

## 2022-03-15 PROCEDURE — 97140 MANUAL THERAPY 1/> REGIONS: CPT | Performed by: PHYSICAL THERAPIST

## 2022-03-15 PROCEDURE — 97161 PT EVAL LOW COMPLEX 20 MIN: CPT | Performed by: PHYSICAL THERAPIST

## 2022-03-15 PROCEDURE — 97110 THERAPEUTIC EXERCISES: CPT | Performed by: PHYSICAL THERAPIST

## 2022-03-16 NOTE — PROGRESS NOTES
Physical Therapy Initial Evaluation and Plan of Care      Patient: Andrea Fam   : 1950  Diagnosis/ICD-10 Code:  Lumbar radiculopathy [M54.16]  Referring practitioner: Nicola Jerry MD  Date of Initial Visit: 3/15/2022  Today's Date: 3/16/2022  Patient seen for 1 session       Visit Diagnoses:    ICD-10-CM ICD-9-CM   1. Lumbar radiculopathy  M54.16 724.4   2. Chronic bilateral low back pain with right-sided sciatica  M54.41 724.2    G89.29 724.3     338.29   3. Pain in hip  M25.559 719.45         Subjective  Chief Complaint/Subjective Report: Patient presented to the clinic today with complaints of pain in the right low back and hip for around 1 year and worsening over the past 9 months. Pt has a PMH of L5-S1 lameinotomy, L RCR, R rTSA, and R Quad tendon rupture. Pt has tried meds and epidurals in the past with little to no relief. Denies B&B changes, does report N&T in the RLE Patient reported no significant medical history today aside from that previously mentioned; no reports of CNS signs or symptoms, or indications of other sinister pathologies were given in the subjective history today.  Mechanism of Injury: Unknown  Functional Limitations: ADLs, work-related activities  Subjective Goals for PT: Return to PLOF, decreased pain with ADLs and community activities  Prior Treatment for Current Condition: MD, meds, injections  Imaging:Yes  Pain/VNRS (0-10/10): Worst: 8/10; Average: 0/10  Agg. Factors: Stanidng, bending and lifting  Relieving Factors: sitting  Subjective Questionnaire: Oswestry: 28  PLOF: Independent with all functional tasks, ADLs, and community activities  Occupation:   Social:   PMH: See history section of patient chart  Precautions/Contraindications: No reported contraindications from subjective history today unless otherwise stated above.      Objective  AROM (% of Full --- * denotes degrees in place of % --- + denotes tested to be WFL)        -- Thoracic Rotation Right:  50 Left:   50    -- Side Bending Right  50 Left 65    -- Lumbar Flexion:   60      -- Lumbar Extension:  75            LE MMT (0-5/5 --- + denotes WFL)  -- Hip Flexion Right:  4/5 Left: 4/5  -- Hip Extension Right: -/5 Left: -/5  -- Hip Abduction Right: 4-/5 Left: 4/5  -- Knee Flexion Right:  4+/5 Left: 4+/5  -- Knee Extension Right: 4+/5 Left: 4+/5  -- Ankle PF Right   -/5 Left: -/5  -- Ankle DF Right  -/5 Left: -/5      Functional Assessment: Impaired gait, decreased lumbar extension throughout gait cycle, impaired functional mobility with lifting, bending, and twisting activities  + Slump and SLR  + Quadrant on R    See Exercise, Manual, and Modality Logs for complete treatment.       Documentation of Assessment Details: Patient presented the clinic with signs and symptoms consistent with radiating referred pain from the lumbar spine. Patient demonstrated limitations and impairments in mobility and functional strength along with neurodynamics during today's evaluation, and will benefit from skilled PT address current limitations and impairments to help patient regain functional mobility and strength necessary to return to PLOF, reduce pain, and improve current symptoms as patient progresses towards meeting current goals established at therapy today. The patient present with no comorbidities or personal factors that impact my POC and deficit in above mentioned areas. Based on these findings and results from valid tests and measures, I am classifying this patient's presentation as stable with uncomplicated characteristics, and a good prognosis for recovery.     Assessment & Plan     Assessment  Impairments: abnormal gait, abnormal or restricted ROM, activity intolerance, impaired physical strength, lacks appropriate home exercise program and pain with function  Functional Limitations: carrying objects, lifting, sleeping, walking, uncomfortable because of pain, sitting and standingPrognosis details: Based on valid tests and  measures performed today I am classifying this patient as presently stable with uncomplicated characteristics and good prognosis for recovery    Goals  Plan Goals: Pt will improve Subjective assessment by >75% within 6 weeks to demonstrate improvements in test and measure outcomes and overall functionality, and to show reduction of symptoms, improved activity tolerance, and ability to complete ADLs and work-related activities     Pt will improve functional mobility and pain free ROM to ranges that allow for pain free functional activities such as dressing, bathing, and completing ADLs within 8 weeks to demonstrate improvements in functional independence, mobility, and community participation to allow for a return to OF.    Pt will demonstrate 80% full ROM for all measured ROMs within 8 weeks to demonstrate improvements in functional mobility and ability to complete ADLs and work-related activities Independently.    Pt will sleep through the night without waking d/t current symptoms >5/7 nights per week within 8 weeks to demonstrate improvements restful sleep, overall function, and symptom reduction    Pt will report pain <2/10 at worst with activity and at rest within 8 weeks to demonstrate improvements in pain-free ROM and function to improve functional mobility, activities tolerance, and ability to complete ADLs and work-related activities    Pt will be able to lift and carry objects >30lbs without worsening of symptoms within 8 weeks to demonstrate improvements in functional mobility for ease of home and community tasks and improved functional independence.    Pt will be able to ambulate >30 mins independently without worsening of symptoms within 8 weeks to demonstrate improvements in functional mobility for ease of home and community ambulation and improved functional independence        Plan  Planned modality interventions: dry needling, TENS, high voltage pulsed current (pain management), electrical  stimulation/Kosovan stimulation and cryotherapy  Planned therapy interventions: ADL retraining, abdominal trunk stabilization, manual therapy, neuromuscular re-education, balance/weight-bearing training, body mechanics training, soft tissue mobilization, spinal/joint mobilization, joint mobilization, home exercise program, gait training, functional ROM exercises, strengthening, therapeutic activities and transfer training  Plan details: Duration: 2-3x/Wk for 4 Weeks - Upon completion of 4 weeks further evaluation and assessment with determine ongoing plan for continued care.    Continue with skilled physical therapy addressing previously mentioned limitations and impairments; progress HEP as tolerated; progress functional strengthening interventions to tolerance.        History # of Personal Factors and/or Comorbidities: LOW (0)  Examination of Body System(s): # of elements: LOW (1-2)  Clinical Presentation: STABLE   Clinical Decision Making: LOW       Timed:         Manual Therapy:    10     mins  17070;     Therapeutic Exercise:    15     mins  37249;     Neuromuscular Morgan:    5    mins  18288;    Therapeutic Activity:     -     mins  89329;     Gait Training:           mins  55025;     Ultrasound:          mins  53476;    Ionto                                  mins   53011  Self Care                           mins   36824  Canalith Repos         mins 58599    Un-Timed:  Electrical Stimulation:          mins  92194 ( );  Dry Needling         mins self-pay  Traction         mins 64722  Low Eval    20     Mins  85952  Mod Eval         Mins  17517  High Eval                            Mins  18192    Timed Treatment:   30   mins   Total Treatment:     50   mins      PT: Alonzo Jim PT     License Number: [id number]  Electronically signed by Alonzo Jim PT, 03/16/22, 4:04 PM EDT    Certification Period: 3/16/2022 thru 6/13/2022  I certify that the therapy services are furnished while this patient is under  my care.  The services outlined above are required by this patient, and will be reviewed every 90 days.         Physician Signature:__________________________________________________    PHYSICIAN: Nicola Jerry MD  NPI: [id number]                                      DATE:      Please sign and return via fax to [email or web address] . Thank you, [facility].

## 2022-03-17 ENCOUNTER — TREATMENT (OUTPATIENT)
Dept: PHYSICAL THERAPY | Facility: CLINIC | Age: 72
End: 2022-03-17

## 2022-03-17 DIAGNOSIS — G89.29 CHRONIC BILATERAL LOW BACK PAIN WITH RIGHT-SIDED SCIATICA: ICD-10-CM

## 2022-03-17 DIAGNOSIS — M25.559 PAIN IN HIP: ICD-10-CM

## 2022-03-17 DIAGNOSIS — M54.41 CHRONIC BILATERAL LOW BACK PAIN WITH RIGHT-SIDED SCIATICA: ICD-10-CM

## 2022-03-17 DIAGNOSIS — M54.16 LUMBAR RADICULOPATHY: Primary | ICD-10-CM

## 2022-03-17 PROCEDURE — 97012 MECHANICAL TRACTION THERAPY: CPT | Performed by: PHYSICAL THERAPIST

## 2022-03-17 PROCEDURE — 97140 MANUAL THERAPY 1/> REGIONS: CPT | Performed by: PHYSICAL THERAPIST

## 2022-03-17 PROCEDURE — 97110 THERAPEUTIC EXERCISES: CPT | Performed by: PHYSICAL THERAPIST

## 2022-03-17 NOTE — PROGRESS NOTES
Physical Therapy Daily Treatment Note      Patient: Andrea Fam   : 1950  Referring practitioner: Nicola Jerry MD  Date of Initial Visit: Type: THERAPY  Noted: 3/15/2022  Today's Date: 3/17/2022  Patient seen for 2 sessions       Visit Diagnoses:    ICD-10-CM ICD-9-CM   1. Lumbar radiculopathy  M54.16 724.4   2. Chronic bilateral low back pain with right-sided sciatica  M54.41 724.2    G89.29 724.3     338.29   3. Pain in hip  M25.559 719.45       Subjective   No change. Pain in R low back 8/10 when I walk.    Objective   See Exercise, Manual, and Modality Logs for complete treatment.     Assessment/Plan  Minimal immediate change with interventions performed today. No adverse side effects with initiation of mechanical traction; we can increase pull at next visit. Updated HEP. Progress per POC.    Timed:         Manual Therapy:    8     mins  12013;     Therapeutic Exercise:    20     mins  74478;     Neuromuscular Morgan:    0    mins  91264;    Therapeutic Activity:     0     mins  14661;     Gait Trainin     mins  95668;     Ultrasound:     0     mins  60824;    Ionto                               0    mins   25003  Traction 10 mins        Timed Treatment:   28   mins   Total Treatment:     40   mins    Opal Stubbs, PT  [place] License: [id number]

## 2022-03-23 ENCOUNTER — TREATMENT (OUTPATIENT)
Dept: PHYSICAL THERAPY | Facility: CLINIC | Age: 72
End: 2022-03-23

## 2022-03-23 DIAGNOSIS — M54.41 CHRONIC BILATERAL LOW BACK PAIN WITH RIGHT-SIDED SCIATICA: ICD-10-CM

## 2022-03-23 DIAGNOSIS — G89.29 CHRONIC BILATERAL LOW BACK PAIN WITH RIGHT-SIDED SCIATICA: ICD-10-CM

## 2022-03-23 DIAGNOSIS — M54.16 LUMBAR RADICULOPATHY: Primary | ICD-10-CM

## 2022-03-23 DIAGNOSIS — M25.559 PAIN IN HIP: ICD-10-CM

## 2022-03-23 PROCEDURE — 97530 THERAPEUTIC ACTIVITIES: CPT | Performed by: PHYSICAL THERAPIST

## 2022-03-23 PROCEDURE — 97012 MECHANICAL TRACTION THERAPY: CPT | Performed by: PHYSICAL THERAPIST

## 2022-03-23 PROCEDURE — 97110 THERAPEUTIC EXERCISES: CPT | Performed by: PHYSICAL THERAPIST

## 2022-03-23 NOTE — PROGRESS NOTES
Physical Therapy Daily Progress Note      Patient: Andrea Fam   : 1950  Treatment Diagnosis:     ICD-10-CM ICD-9-CM   1. Lumbar radiculopathy  M54.16 724.4   2. Chronic bilateral low back pain with right-sided sciatica  M54.41 724.2    G89.29 724.3     338.29   3. Pain in hip  M25.559 719.45     Referring practitioner: Nicola Jerry MD  Date of Initial Visit: Type: THERAPY  Noted: 3/15/2022  Today's Date: 3/23/2022  Patient seen for 3 sessions           Subjective   Patient reports his pain is less today, 7/10.    Objective     See Exercise, Manual, and Modality Logs for complete treatment.       Assessment/Plan  Patient presented with forward flexed trunk with gait and static standing.  He tolerated progression of exercise program well with decreased pain symptoms.  He was able to stand upright and walk with less trunk flexion following exercises.  Required cueing for proper core muscle activation and to not hold breath/dome abdomen.  He does have a small diastasis recti.  Provided written instructions for HEP.  Progress per Plan of Care and Progress strengthening /stabilization /functional activity           Timed:         Manual Therapy:         mins  85244;     Therapeutic Exercise:    30     mins  61328;     Neuromuscular Morgan:        mins  72214;    Therapeutic Activity:     10     mins  05543;     Gait Training:           mins  38193;     Ultrasound:          mins  03597;    Ionto                                  mins  84993  Self Care                            mins  40179  Canalith Repos         mins  42132  Orthotic MGMT/Train         mins  44576    Un-Timed:  Electrical Stimulation:         mins  03647 ( );  Dry Needling:          mins  01886 self-pay;  Dry Needling:          mins  00424 self-pay  Traction     10     mins  84819      Timed Treatment:  40    mins   Total Treatment:     55   mins        PT SIGNATURE: Cindy Soto PT     License Number: [id number]  Electronically  signed by Cindy Soto, PT, 03/23/22, 12:35 PM EDT

## 2022-03-23 NOTE — PATIENT INSTRUCTIONS
Access Code: [email or web address]  URL: [email or web address]  Date: 03/23/2022  Prepared by: Cindy Soto    Exercises  Supine Posterior Pelvic Tilt - 1 x daily - 7 x weekly - 1 sets - 10 reps - 5 sec. hold  Supine Hip Adduction Isometric with Ball - 1 x daily - 7 x weekly - 1 sets - 10 reps - 5 sec. hold  Supine Straight Leg Hip Adduction and Quad Set with Ball - 1 x daily - 7 x weekly - 1 sets - 10 reps - 5 sec. hold  Hooklying Clamshell with Resistance - 1 x daily - 7 x weekly - 1 sets - 10 reps - 5 sec. hold  Hooklying Isometric Clamshell - 1 x daily - 7 x weekly - 1 sets - 10 reps - 5 sec. hold  Hooklying Isometric Hip Flexion - 1 x daily - 7 x weekly - 1 sets - 10 reps - 5 sec. hold  Hip Flexor Stretch at Edge of Bed - 2 x daily - 7 x weekly - 1 sets - 4 reps - 20-30 sec. hold  Supine Sciatic Nerve Glide - 1 x daily - 7 x weekly - 1 sets - 10 reps

## 2022-03-28 ENCOUNTER — TREATMENT (OUTPATIENT)
Dept: PHYSICAL THERAPY | Facility: CLINIC | Age: 72
End: 2022-03-28

## 2022-03-28 DIAGNOSIS — M54.41 CHRONIC BILATERAL LOW BACK PAIN WITH RIGHT-SIDED SCIATICA: ICD-10-CM

## 2022-03-28 DIAGNOSIS — M25.559 PAIN IN HIP: ICD-10-CM

## 2022-03-28 DIAGNOSIS — G89.29 CHRONIC BILATERAL LOW BACK PAIN WITH RIGHT-SIDED SCIATICA: ICD-10-CM

## 2022-03-28 DIAGNOSIS — M54.16 LUMBAR RADICULOPATHY: Primary | ICD-10-CM

## 2022-03-28 PROCEDURE — 97110 THERAPEUTIC EXERCISES: CPT | Performed by: PHYSICAL THERAPIST

## 2022-03-28 PROCEDURE — 97012 MECHANICAL TRACTION THERAPY: CPT | Performed by: PHYSICAL THERAPIST

## 2022-03-28 NOTE — PROGRESS NOTES
Physical Therapy Daily Treatment Note      Patient: Andrea Fam   : 1950  Referring practitioner: Nicola Jerry MD  Date of Initial Visit: Type: THERAPY  Noted: 3/15/2022  Today's Date: 3/28/2022  Patient seen for 4 sessions       Visit Diagnoses:    ICD-10-CM ICD-9-CM   1. Lumbar radiculopathy  M54.16 724.4   2. Chronic bilateral low back pain with right-sided sciatica  M54.41 724.2    G89.29 724.3     338.29   3. Pain in hip  M25.559 719.45       Subjective   Surprisingly better. Still having pain in leg, but pain is less severe. Rates pain 6/10.    Objective   See Exercise, Manual, and Modality Logs for complete treatment.       Assessment/Plan  Subjective reports of pain improving. Demonstrated good activation of diaphragm as a component of core strengthening. Updated HEP with core progression. Progress per POC.      Timed:         Manual Therapy:    5     mins  12124;     Therapeutic Exercise:    20     mins  09714;     Neuromuscular Morgan:    0    mins  67715;    Therapeutic Activity:     0     mins  93135;     Gait Trainin     mins  14165;     Ultrasound:     0     mins  86037;    Ionto                               0    mins   94105  Traction 10 mins        Timed Treatment:   25   mins   Total Treatment:     40   mins    Opal Stubbs, PT  [place] License: [id number]

## 2022-04-05 ENCOUNTER — TREATMENT (OUTPATIENT)
Dept: PHYSICAL THERAPY | Facility: CLINIC | Age: 72
End: 2022-04-05

## 2022-04-05 DIAGNOSIS — M54.41 CHRONIC BILATERAL LOW BACK PAIN WITH RIGHT-SIDED SCIATICA: ICD-10-CM

## 2022-04-05 DIAGNOSIS — M54.16 LUMBAR RADICULOPATHY: Primary | ICD-10-CM

## 2022-04-05 DIAGNOSIS — G89.29 CHRONIC BILATERAL LOW BACK PAIN WITH RIGHT-SIDED SCIATICA: ICD-10-CM

## 2022-04-05 DIAGNOSIS — M25.559 PAIN IN HIP: ICD-10-CM

## 2022-04-05 PROCEDURE — 97012 MECHANICAL TRACTION THERAPY: CPT | Performed by: PHYSICAL THERAPIST

## 2022-04-05 PROCEDURE — 97140 MANUAL THERAPY 1/> REGIONS: CPT | Performed by: PHYSICAL THERAPIST

## 2022-04-05 PROCEDURE — 97110 THERAPEUTIC EXERCISES: CPT | Performed by: PHYSICAL THERAPIST

## 2022-04-05 NOTE — PROGRESS NOTES
Physical Therapy Daily Treatment Note      Patient: Andrea Fam   : 1950  Referring practitioner: Nicola Jerry MD  Date of Initial Visit: Type: THERAPY  Noted: 3/15/2022  Today's Date: 2022  Patient seen for 5 sessions       Visit Diagnoses:    ICD-10-CM ICD-9-CM   1. Lumbar radiculopathy  M54.16 724.4   2. Chronic bilateral low back pain with right-sided sciatica  M54.41 724.2    G89.29 724.3     338.29   3. Pain in hip  M25.559 719.45       Subjective   Feeling better since epidural Wednesday. Rates pain 2/10.    Objective   See Exercise, Manual, and Modality Logs for complete treatment.     Assessment/Plan  Subjective report of pain decreased with epidural. Minimal tenderness of psoas bilaterally. Excellent tolerance to core strength progressions. Updated HEP. Progress per POC.    Timed:         Manual Therapy:    8     mins  60081;     Therapeutic Exercise:    30     mins  11685;     Neuromuscular Morgan:    0    mins  03403;    Therapeutic Activity:     0     mins  69789;     Gait Trainin     mins  63917;     Ultrasound:     0     mins  75294;    Ionto                               0    mins   93928  Traction 10 mins        Timed Treatment:   38   mins   Total Treatment:     50   mins    Opal Stubbs, PT  [place] License: [id number]

## 2022-04-07 ENCOUNTER — TREATMENT (OUTPATIENT)
Dept: PHYSICAL THERAPY | Facility: CLINIC | Age: 72
End: 2022-04-07

## 2022-04-07 DIAGNOSIS — M54.41 CHRONIC BILATERAL LOW BACK PAIN WITH RIGHT-SIDED SCIATICA: ICD-10-CM

## 2022-04-07 DIAGNOSIS — G89.29 CHRONIC BILATERAL LOW BACK PAIN WITH RIGHT-SIDED SCIATICA: ICD-10-CM

## 2022-04-07 DIAGNOSIS — M25.559 PAIN IN HIP: ICD-10-CM

## 2022-04-07 DIAGNOSIS — M54.16 LUMBAR RADICULOPATHY: Primary | ICD-10-CM

## 2022-04-07 PROCEDURE — 97110 THERAPEUTIC EXERCISES: CPT | Performed by: PHYSICAL THERAPIST

## 2022-04-07 PROCEDURE — 97012 MECHANICAL TRACTION THERAPY: CPT | Performed by: PHYSICAL THERAPIST

## 2022-04-07 NOTE — PROGRESS NOTES
Physical Therapy Daily Treatment Note      Patient: Andrea Fam   : 1950  Referring practitioner: Nicola Jerry MD  Date of Initial Visit: Type: THERAPY  Noted: 3/15/2022  Today's Date: 2022  Patient seen for 6 sessions       Visit Diagnoses:    ICD-10-CM ICD-9-CM   1. Lumbar radiculopathy  M54.16 724.4   2. Chronic bilateral low back pain with right-sided sciatica  M54.41 724.2    G89.29 724.3     338.29   3. Pain in hip  M25.559 719.45       Subjective   Pain in R hip when walking. Rates pain /10. Can I use the leg press at [employer]?     Objective   See Exercise, Manual, and Modality Logs for complete treatment.     Assessment/Plan  Pt reported no hip pain after mechanical traction. R posterior hip non-tender. Educated pt on use of leg press, so he can do so safely at gym. Continues to have difficulty with supine to sit transition due to weakness. Updated HEP. Progress per POC.      Timed:         Manual Therapy:    0     mins  26595;     Therapeutic Exercise:    30     mins  07849;     Neuromuscular Morgan:    0    mins  88939;    Therapeutic Activity:     0     mins  89331;     Gait Trainin     mins  90705;     Ultrasound:     0     mins  03791;    Ionto                               0    mins   26744  Traction 15        Timed Treatment:   30   mins   Total Treatment:     45   mins    Opal Stubbs PT  [place] License: [id number]

## 2022-04-13 ENCOUNTER — TREATMENT (OUTPATIENT)
Dept: PHYSICAL THERAPY | Facility: CLINIC | Age: 72
End: 2022-04-13

## 2022-04-13 DIAGNOSIS — M25.559 PAIN IN HIP: ICD-10-CM

## 2022-04-13 DIAGNOSIS — G89.29 CHRONIC BILATERAL LOW BACK PAIN WITH RIGHT-SIDED SCIATICA: ICD-10-CM

## 2022-04-13 DIAGNOSIS — M54.41 CHRONIC BILATERAL LOW BACK PAIN WITH RIGHT-SIDED SCIATICA: ICD-10-CM

## 2022-04-13 DIAGNOSIS — M54.16 LUMBAR RADICULOPATHY: Primary | ICD-10-CM

## 2022-04-13 PROCEDURE — 97110 THERAPEUTIC EXERCISES: CPT | Performed by: PHYSICAL THERAPIST

## 2022-04-13 PROCEDURE — 97012 MECHANICAL TRACTION THERAPY: CPT | Performed by: PHYSICAL THERAPIST

## 2022-04-13 PROCEDURE — 97530 THERAPEUTIC ACTIVITIES: CPT | Performed by: PHYSICAL THERAPIST

## 2022-04-13 NOTE — PROGRESS NOTES
Physical Therapy Re-Assessment / Re-Certification        Patient: Andrea Fam   : 1950  Visit Diagnoses:     ICD-10-CM ICD-9-CM   1. Lumbar radiculopathy  M54.16 724.4   2. Chronic bilateral low back pain with right-sided sciatica  M54.41 724.2    G89.29 724.3     338.29   3. Pain in hip  M25.559 719.45     Referring practitioner: Nicola Jerry MD  Date of Initial Visit: Type: THERAPY  Noted: 3/15/2022  Today's Date: 2022  Patient seen for 7 sessions      Subjective:   Andrea Fam reports:   Subjective Questionnaire: Oswestry:   Clinical Progress: improved  Home Program Compliance: Yes  Treatment has included: therapeutic exercise, manual therapy and therapeutic activity    Subjective   Patient reports he has been having less back pain.  Does not feel he has his leg strength back yet.  Reports he rode in golf cart yesterday and did a little walking-has increased tightness in right calf muscles but no increased pain.    Objective          Palpation     Additional Palpation Details  No TTP    Active Range of Motion     Lumbar   Flexion: 30 degrees   Extension: 10 degrees with pain  Left lateral flexion: 12 degrees   Right lateral flexion: 12 degrees   Left rotation: 44 degrees   Right rotation: 30 degrees     Strength/Myotome Testing     Lumbar   Left   Heel walk: abnormal  Toe walk: abnormal    Right   Heel walk: abnormal  Toe walk: abnormal    Left Hip   Planes of Motion   Flexion: 5    Right Hip   Planes of Motion   Flexion: 5    Left Knee   Flexion: 5  Extension: 5    Right Knee   Flexion: 5  Extension: 5    Left Ankle/Foot   Dorsiflexion: 4-  Plantar flexion: 3-  Eversion: 4  Great toe extension: 4-    Right Ankle/Foot   Dorsiflexion: 4-  Plantar flexion: 3-  Eversion: 4  Great toe extension: 4-    Tests     Lumbar     Left   Negative passive SLR.     Right   Negative passive SLR.     Additional Tests Details  SLR: L 55 degrees, R 55 degrees    Decreased bilateral hip rotator muscle  flexibility.    Functional Assessment     Single Leg Stance   Left: 6 seconds  Right: 2 seconds        See Exercise, Manual, and Modality Logs for complete treatment.     Assessment & Plan     Assessment    Assessment details: Patient is progressing with treatment.  He has less pain symptoms although he continued with limited lumbar ROM, LE flexibility and strength.  He is tolerating exercise progression well without adverse symptoms. Pt will benefit from skilled PT services in order to address listed impairments, decrease pain and restore function.    Prognosis details: Patient demonstrates good rehab potential as evidenced by high motivation to participate with PT POC and to return to PLOF.    Goals  Plan Goals: Short Term Goals (3 wks):  1.  Patient will have increased lumbar spine ROM to WFLs to allow for increased functional joint mobility.  2.  Patient will have increased core muscle activation to WNL to allow for increased spinal stabilization and support.  3.  Patient will have increased SLR to 65 degrees.  4.  Patient will have increased SLS time to 30 sec. bilaterally.  5.  Patient will have negative Modified Gil Test bilaterally.    Long Term Goals (6 wks):  1.  Patient will be independent in performance of HEP for carryover upon discharge from skilled PT services.  2.  Patient will have improved Oswestry score of 10/50 or better.  3.  Patient will have increased ankle DF and eversion strength to 4+/5 or better.  4.  Patient will have increased ankle PF strength to 4/5 or better.    Plan  Therapy options: will be seen for skilled therapy services  Planned modality interventions: traction, cryotherapy and thermotherapy (hydrocollator packs)  Planned therapy interventions: manual therapy, postural training, soft tissue mobilization, spinal/joint mobilization, strengthening, stretching, flexibility, functional ROM exercises, home exercise program, therapeutic activities, neuromuscular re-education,  abdominal trunk stabilization, body mechanics training and balance/weight-bearing training  Frequency: 2x week  Duration in weeks: 6  Treatment plan discussed with: patient  Plan details: Pt was educated on the importance of their HEP and their current need for continued skilled physical therapy. Patients goals and potential limitations were discussed and pt is in agreement with current plan of care and treatment emphasis.          PT SIGNATURE: Cindy Soto PT     License Number: [id number]  Electronically signed by Cindy Soto PT, 04/13/22, 2:05 PM EDT    Certification Period: 4/13/2022 thru 7/11/2022  I certify that the therapy services are furnished while this patient is under my care.  The services outlined above are required by this patient, and will be reviewed every 90 days.    Signature: __________________________________    Nicola Jerry MD   NPI: [id number]    Please sign and return via fax to [phone]. Thank you, [facility].      Timed:         Manual Therapy:         mins  09769;     Therapeutic Exercise:    30     mins  84664;     Neuromuscular Morgan:        mins  53926;    Therapeutic Activity:     15     mins  53969;     Gait Training:           mins  70772;     Ultrasound:          mins  85341;    Ionto                                  mins  28553  Self Care                            mins  79883  Canalith Repos         mins  91633  Orthotic MGMT/Train         mins  83945    Un-Timed:  Electrical Stimulation:         mins  39695 (MC );  Dry Needling:          mins  76747 self-pay;  Dry Needling:          mins  79384 self-pay  Traction     15     mins  46299  Low Eval          mins  65267  Mod Eval          mins  19193  High Eval                            mins  64313    Timed Treatment:   45   mins   Total Treatment:     60   mins

## 2022-04-15 ENCOUNTER — TREATMENT (OUTPATIENT)
Dept: PHYSICAL THERAPY | Facility: CLINIC | Age: 72
End: 2022-04-15

## 2022-04-15 DIAGNOSIS — M25.559 PAIN IN HIP: ICD-10-CM

## 2022-04-15 DIAGNOSIS — M54.41 CHRONIC BILATERAL LOW BACK PAIN WITH RIGHT-SIDED SCIATICA: ICD-10-CM

## 2022-04-15 DIAGNOSIS — G89.29 CHRONIC BILATERAL LOW BACK PAIN WITH RIGHT-SIDED SCIATICA: ICD-10-CM

## 2022-04-15 DIAGNOSIS — M54.16 LUMBAR RADICULOPATHY: Primary | ICD-10-CM

## 2022-04-15 PROCEDURE — 97110 THERAPEUTIC EXERCISES: CPT | Performed by: PHYSICAL THERAPIST

## 2022-04-15 PROCEDURE — 97530 THERAPEUTIC ACTIVITIES: CPT | Performed by: PHYSICAL THERAPIST

## 2022-04-15 NOTE — PROGRESS NOTES
Physical Therapy Daily Progress Note      Patient: Andrea Fam   : 1950  Treatment Diagnosis:     ICD-10-CM ICD-9-CM   1. Lumbar radiculopathy  M54.16 724.4   2. Chronic bilateral low back pain with right-sided sciatica  M54.41 724.2    G89.29 724.3     338.29   3. Pain in hip  M25.559 719.45     Referring practitioner: Nicola Jerry MD  Date of Initial Visit: Type: THERAPY  Noted: 3/15/2022  Today's Date: 4/15/2022  Patient seen for 8 sessions           Subjective   Patient reports he has not been having pain in his back.  Still having some stiffness in his right calf muscle but that is loosening up.    Objective     See Exercise, Manual, and Modality Logs for complete treatment.       Assessment/Plan  Patient performed program to tolerance.  He was able to perform core strengthening exercises with progressed parameters as well as new LE stretching exercises.  No adverse symptoms with program progression.    Benefits from cueing for proper exercise technique  Progress per Plan of Care and Progress strengthening /stabilization /functional activity           Timed:         Manual Therapy:         mins  26427;     Therapeutic Exercise:    30     mins  10903;     Neuromuscular Morgan:        mins  86251;    Therapeutic Activity:     15     mins  14621;     Gait Training:           mins  68722;     Ultrasound:          mins  12691;    Ionto                                  mins  05334  Self Care                            mins  93634  Canalith Repos         mins  41324  Orthotic MGMT/Train         mins  80706    Un-Timed:  Electrical Stimulation:         mins  80360 ( );  Dry Needling:          mins  03312 self-pay;  Dry Needling:          mins  09294 self-pay  Traction          mins  30915      Timed Treatment:   45   mins   Total Treatment:     45   mins        PT SIGNATURE: Cindy Soto PT     License Number: [id number]  Electronically signed by Cindy Soto PT, 04/15/22, 2:08 PM  EDT

## 2022-04-19 ENCOUNTER — TREATMENT (OUTPATIENT)
Dept: PHYSICAL THERAPY | Facility: CLINIC | Age: 72
End: 2022-04-19

## 2022-04-19 DIAGNOSIS — M25.559 PAIN IN HIP: ICD-10-CM

## 2022-04-19 DIAGNOSIS — M54.41 CHRONIC BILATERAL LOW BACK PAIN WITH RIGHT-SIDED SCIATICA: ICD-10-CM

## 2022-04-19 DIAGNOSIS — M54.16 LUMBAR RADICULOPATHY: Primary | ICD-10-CM

## 2022-04-19 DIAGNOSIS — G89.29 CHRONIC BILATERAL LOW BACK PAIN WITH RIGHT-SIDED SCIATICA: ICD-10-CM

## 2022-04-19 PROCEDURE — 97110 THERAPEUTIC EXERCISES: CPT | Performed by: PHYSICAL THERAPIST

## 2022-04-19 PROCEDURE — 97530 THERAPEUTIC ACTIVITIES: CPT | Performed by: PHYSICAL THERAPIST

## 2022-04-19 NOTE — PROGRESS NOTES
Physical Therapy Daily Progress Note      Patient: Andrea Fam   : 1950  Treatment Diagnosis:     ICD-10-CM ICD-9-CM   1. Lumbar radiculopathy  M54.16 724.4   2. Chronic bilateral low back pain with right-sided sciatica  M54.41 724.2    G89.29 724.3     338.29   3. Pain in hip  M25.559 719.45     Referring practitioner: Nicola Jerry MD  Date of Initial Visit: Type: THERAPY  Noted: 3/15/2022  Today's Date: 2022  Patient seen for 9 sessions           Subjective   Patient reports he had muscle cramps in his right calf.  Reports his back is feeling good and has been doing well with his HEP.      Objective     See Exercise, Manual, and Modality Logs for complete treatment.       Assessment/Plan  Patient performed program to tolerance with no adverse symptoms.  He was able to perform core strengthening exercises with progressed parameters.  Continues with significant HS muscle tightness.  Progress per Plan of Care           Timed:         Manual Therapy:         mins  43381;     Therapeutic Exercise:    30     mins  53057;     Neuromuscular Morgan:        mins  24479;    Therapeutic Activity:     13     mins  32751;     Gait Training:           mins  18500;     Ultrasound:          mins  60089;    Ionto                                  mins  36314  Self Care                            mins  33686  Canalith Repos         mins  13348  Orthotic MGMT/Train         mins  45561    Un-Timed:  Electrical Stimulation:         mins  27682 ( );  Dry Needling:          mins  14078 self-pay;  Dry Needling:          mins  33547 self-pay  Traction          mins  67307      Timed Treatment:   43   mins   Total Treatment:     45   mins        PT SIGNATURE: Cindy Soto PT     License Number: [id number]  Electronically signed by Cindy Soto PT, 22, 2:52 PM EDT

## 2022-04-21 ENCOUNTER — TREATMENT (OUTPATIENT)
Dept: PHYSICAL THERAPY | Facility: CLINIC | Age: 72
End: 2022-04-21

## 2022-04-21 DIAGNOSIS — M25.559 PAIN IN HIP: ICD-10-CM

## 2022-04-21 DIAGNOSIS — M54.41 CHRONIC BILATERAL LOW BACK PAIN WITH RIGHT-SIDED SCIATICA: ICD-10-CM

## 2022-04-21 DIAGNOSIS — G89.29 CHRONIC BILATERAL LOW BACK PAIN WITH RIGHT-SIDED SCIATICA: ICD-10-CM

## 2022-04-21 DIAGNOSIS — M54.16 LUMBAR RADICULOPATHY: Primary | ICD-10-CM

## 2022-04-21 PROCEDURE — 97530 THERAPEUTIC ACTIVITIES: CPT | Performed by: PHYSICAL THERAPIST

## 2022-04-21 PROCEDURE — 97110 THERAPEUTIC EXERCISES: CPT | Performed by: PHYSICAL THERAPIST

## 2022-04-21 NOTE — PROGRESS NOTES
PHYSICAL THERAPY DISCHARGE SUMMARY      Patient: Andrea Fam   : 1950  Diagnosis/ICD-10 Code:  Lumbar radiculopathy [M54.16]  Referring practitioner: Nicola Jerry MD  Date of Initial Visit: Type: THERAPY  Noted: 3/15/2022  Today's Date: 2022  Patient seen for 10 sessions      Subjective:   Andrea Fam reports:   Subjective Questionnaire: Oswestry:   Clinical Progress: improved  Home Program Compliance: Yes  Treatment has included: therapeutic exercise, manual therapy, therapeutic activity and traction    Subjective Evaluation    Pain  At best pain ratin  At worst pain ratin        Patient reports his back is still feeling good, notes still having weakness in the leg but better than initially.  Patient reports he randomly gets pain in right buttock but it is 2/20 at worst. States he feels ready to manage his symptoms independently at home.      Objective     Palpation     Additional Palpation Details  No TTP     Active Range of Motion      Lumbar   Flexion: 43 degrees   Extension: 20 degrees with slight discomfort in right hip/buttocks  Left lateral flexion: 18 degrees   Right lateral flexion: 15 degrees   Left rotation: 45 degrees   Right rotation: 45 degrees      Strength/Myotome Testing      Lumbar   Left   Heel walk: abnormal  Toe walk: abnormal     Right   Heel walk: abnormal  Toe walk: abnormal     Left Hip   Planes of Motion   Flexion: 5     Right Hip   Planes of Motion   Flexion: 5     Left Knee   Flexion: 5  Extension: 5     Right Knee   Flexion: 5  Extension: 5     Left Ankle/Foot   Dorsiflexion: 4  Plantar flexion: 3  Eversion: 4  Great toe extension: 4-     Right Ankle/Foot   Dorsiflexion: 4  Plantar flexion: 3  Eversion: 4  Great toe extension: 4-     Tests      Lumbar      Left   Negative passive SLR.      Right   Negative passive SLR.     Additional Tests Details  SLR: L 70 degrees, R 70 degrees    Bilateral hip rotator muscle flexibility-WFL    Good core muscle  activation     Functional Assessment      Single Leg Stance   Left: 30 seconds  Right: 17 seconds         See Exercise, Manual, and Modality Logs for complete treatment.       Assessment/Plan   Patient presents with improved lumbar spine ROM, LE muscle flexibility and core muscle activation.  He continues to have weakness in bilateral ankles.  Oswestry score has improved to 4/50 from  17/50.  He is independent in his HEP and can continue to work on increasing ankle strength via his HEP.    Progress toward previous goals: Partially Met    Short Term Goals:  1.  Patient will have increased lumbar spine ROM to WFLs to allow for increased functional joint mobility.-met  2.  Patient will have increased core muscle activation to WNL to allow for increased spinal stabilization and support.-met  3.  Patient will have increased SLR to 65 degrees.  4.  Patient will have increased SLS time to 30 sec. bilaterally.  5.  Patient will have negative Modified Gil Test bilaterally.    Long Term Goals:  1.  Patient will be independent in performance of HEP for carryover upon discharge from skilled PT services.-Met  2.  Patient will have improved Oswestry score of 10/50 or better.-met  3.  Patient will have increased ankle DF and eversion strength to 4+/5 or better.-not met  4.  Patient will have increased ankle PF strength to 4/5 or better.-not met        Recommendations: Discharge to [facility]    PT SIGNATURE: Cindy Soto, PT     License Number: [id number]  Electronically signed by Cindy Soto, PT, 04/21/22, 2:30 PM EDT    Timed:         Manual Therapy:         mins  66063;     Therapeutic Exercise:    30     mins  78499;     Neuromuscular Morgan:        mins  04688;    Therapeutic Activity:     15     mins  99618;     Gait Training:           mins  72320;     Ultrasound:          mins  22212;    Ionto                                  mins  71274  Self Care                            mins  74303  [facility]         mins   17269  Orthotic MGMT/Train         mins  49049    Un-Timed:  Electrical Stimulation:         mins  87828 ( );  Dry Needling:          mins  01717 self-pay;  Dry Needling:          mins  62733 self-pay  Traction          mins  74709  Low Eval          mins  04140  Mod Eval          mins  70706  High Eval                            mins  48674    Timed Treatment:   45   mins   Total Treatment:     45   mins

## 2022-04-30 DIAGNOSIS — E11.42 TYPE 2 DIABETES MELLITUS WITH PERIPHERAL NEUROPATHY: ICD-10-CM

## 2022-05-03 ENCOUNTER — TELEPHONE (OUTPATIENT)
Dept: ENDOCRINOLOGY | Age: 72
End: 2022-05-03

## 2022-05-03 DIAGNOSIS — E11.42 TYPE 2 DIABETES MELLITUS WITH PERIPHERAL NEUROPATHY: ICD-10-CM

## 2022-05-03 NOTE — TELEPHONE ENCOUNTER
PT CALLED FOR REFILLS OF THE FOLLOWING MEDS:    gabapentin (NEURONTIN) 300 MG capsule [03918]     Dulaglutide (Trulicity) 1.5 MG/0.5ML solution pen-injector [361516]    Toujeo SoloStar 300 UNIT/ML solution pen-injector injection [781332]    GOING TO:    [facility] - [place], [address] [phone] [facility] [phone] FX     THANK YOU

## 2022-05-04 RX ORDER — INSULIN GLARGINE 300 U/ML
24 INJECTION, SOLUTION SUBCUTANEOUS NIGHTLY
Qty: 7.2 ML | Refills: 0 | Status: SHIPPED | OUTPATIENT
Start: 2022-05-04 | End: 2022-05-05 | Stop reason: SDUPTHER

## 2022-05-04 NOTE — TELEPHONE ENCOUNTER
Anticonvulsants Protocol Failed 04/30/2022 02:30 PM   Protocol Details  CBC in past 12 months    Visit with authorizing provider in past      Last ov appt 8/20/21  No future appts

## 2022-05-05 RX ORDER — INSULIN GLARGINE 300 U/ML
24 INJECTION, SOLUTION SUBCUTANEOUS NIGHTLY
Qty: 27.5 ML | Refills: 2 | Status: SHIPPED | OUTPATIENT
Start: 2022-05-05 | End: 2022-05-09 | Stop reason: SDUPTHER

## 2022-05-05 RX ORDER — GABAPENTIN 300 MG/1
CAPSULE ORAL
Qty: 540 CAPSULE | Refills: 0 | Status: CANCELLED | OUTPATIENT
Start: 2022-05-05

## 2022-05-05 RX ORDER — DAPAGLIFLOZIN 10 MG/1
TABLET, FILM COATED ORAL
Qty: 30 TABLET | Refills: 11 | Status: SHIPPED | OUTPATIENT
Start: 2022-05-05 | End: 2022-07-11

## 2022-05-05 RX ORDER — GABAPENTIN 300 MG/1
CAPSULE ORAL
Qty: 540 CAPSULE | Refills: 0 | Status: SHIPPED | OUTPATIENT
Start: 2022-05-05 | End: 2022-08-17 | Stop reason: SDUPTHER

## 2022-05-05 RX ORDER — DULAGLUTIDE 1.5 MG/.5ML
INJECTION, SOLUTION SUBCUTANEOUS
Qty: 6 ML | Refills: 3 | Status: SHIPPED | OUTPATIENT
Start: 2022-05-05 | End: 2022-09-29

## 2022-05-05 RX ORDER — DULAGLUTIDE 1.5 MG/.5ML
1.5 INJECTION, SOLUTION SUBCUTANEOUS WEEKLY
Qty: 12 PEN | Refills: 0 | Status: SHIPPED | OUTPATIENT
Start: 2022-05-05 | End: 2022-05-05

## 2022-05-09 ENCOUNTER — TELEPHONE (OUTPATIENT)
Dept: ENDOCRINOLOGY | Age: 72
End: 2022-05-09

## 2022-05-09 RX ORDER — INSULIN GLARGINE 300 U/ML
28 INJECTION, SOLUTION SUBCUTANEOUS DAILY
Qty: 13.5 ML | Refills: 2 | Status: SHIPPED | OUTPATIENT
Start: 2022-05-09 | End: 2022-07-11

## 2022-05-16 ENCOUNTER — OFFICE VISIT (OUTPATIENT)
Dept: INTERNAL MEDICINE | Facility: CLINIC | Age: 72
End: 2022-05-16

## 2022-05-16 VITALS
DIASTOLIC BLOOD PRESSURE: 72 MMHG | WEIGHT: 194.2 LBS | OXYGEN SATURATION: 96 % | TEMPERATURE: 98.4 F | SYSTOLIC BLOOD PRESSURE: 118 MMHG | HEART RATE: 81 BPM | HEIGHT: 69 IN | BODY MASS INDEX: 28.76 KG/M2

## 2022-05-16 DIAGNOSIS — Z00.00 MEDICARE ANNUAL WELLNESS VISIT, SUBSEQUENT: Primary | ICD-10-CM

## 2022-05-16 DIAGNOSIS — M54.16 LUMBAR RADICULOPATHY: ICD-10-CM

## 2022-05-16 DIAGNOSIS — E03.9 ACQUIRED HYPOTHYROIDISM: ICD-10-CM

## 2022-05-16 DIAGNOSIS — E78.2 MIXED HYPERLIPIDEMIA: ICD-10-CM

## 2022-05-16 DIAGNOSIS — M47.26 OSTEOARTHRITIS OF SPINE WITH RADICULOPATHY, LUMBAR REGION: ICD-10-CM

## 2022-05-16 DIAGNOSIS — Z79.4 TYPE 2 DIABETES MELLITUS WITH DIABETIC POLYNEUROPATHY, WITH LONG-TERM CURRENT USE OF INSULIN: ICD-10-CM

## 2022-05-16 DIAGNOSIS — E11.42 TYPE 2 DIABETES MELLITUS WITH DIABETIC POLYNEUROPATHY, WITH LONG-TERM CURRENT USE OF INSULIN: ICD-10-CM

## 2022-05-16 PROBLEM — I10 HTN (HYPERTENSION): Status: RESOLVED | Noted: 2020-07-06 | Resolved: 2022-05-16

## 2022-05-16 PROCEDURE — 1159F MED LIST DOCD IN RCRD: CPT | Performed by: FAMILY MEDICINE

## 2022-05-16 PROCEDURE — 99213 OFFICE O/P EST LOW 20 MIN: CPT | Performed by: FAMILY MEDICINE

## 2022-05-16 PROCEDURE — 1170F FXNL STATUS ASSESSED: CPT | Performed by: FAMILY MEDICINE

## 2022-05-16 PROCEDURE — 1125F AMNT PAIN NOTED PAIN PRSNT: CPT | Performed by: FAMILY MEDICINE

## 2022-05-16 PROCEDURE — G0439 PPPS, SUBSEQ VISIT: HCPCS | Performed by: FAMILY MEDICINE

## 2022-05-16 RX ORDER — DULOXETIN HYDROCHLORIDE 30 MG/1
CAPSULE, DELAYED RELEASE ORAL
COMMUNITY
Start: 2022-04-19 | End: 2022-05-16

## 2022-05-16 NOTE — PROGRESS NOTES
"Chief Complaint  Medicare Wellness-subsequent and Labs Only (A1c due)    Subjective          Andrea Fam presents to [facility]  Very active gentleman with onset of lumbar radiculopathy right leg after prior left lumbar laminectomy 1985.  MRI is reviewed with him he is a follow-up with neurosurgery.  He has gotten some success with seeing Dr. Bhatia and receiving epidurals.    Otherwise he sees endocrinology with active management of type 2 diabetes insulin requiring as well as hyperlipidemia.  Also treatment of hypothyroidism.      Objective   Vital Signs:  /72 (BP Location: Left arm, Patient Position: Sitting, Cuff Size: Adult)   Pulse 81   Temp 98.4 °F (36.9 °C) (Temporal)   Ht 175.3 cm (69\")   Wt 88.1 kg (194 lb 3.2 oz)   SpO2 96%   BMI 28.68 kg/m²           Physical Exam  Vitals reviewed.   Constitutional:       Appearance: He is well-developed.   HENT:      Head: Normocephalic and atraumatic.      Right Ear: Tympanic membrane and external ear normal.      Left Ear: Tympanic membrane and external ear normal.      Nose: Nose normal.   Eyes:      Conjunctiva/sclera: Conjunctivae normal.      Pupils: Pupils are equal, round, and reactive to light.   Neck:      Thyroid: No thyromegaly.      Vascular: No JVD.   Cardiovascular:      Rate and Rhythm: Normal rate and regular rhythm.      Heart sounds: Normal heart sounds.   Pulmonary:      Effort: Pulmonary effort is normal.      Breath sounds: Normal breath sounds.   Abdominal:      General: Bowel sounds are normal.      Palpations: Abdomen is soft.   Musculoskeletal:      Cervical back: Normal range of motion and neck supple.      Lumbar back: Decreased range of motion.   Lymphadenopathy:      Cervical: No cervical adenopathy.   Skin:     General: Skin is warm and dry.      Findings: No rash.   Neurological:      Mental Status: He is alert and oriented to person, place, and time.      Cranial Nerves: No cranial nerve " deficit.      Coordination: Coordination normal.   Psychiatric:         Behavior: Behavior normal.         Thought Content: Thought content normal.         Judgment: Judgment normal.        Result Review :   The following data was reviewed by: Suresh Rolon MD on 05/16/2022:  Common labs    Common Labsle 8/10/21 8/10/21 8/10/21 8/10/21 12/30/21 12/30/21 12/30/21 12/30/21    1013 1013 1013 1013 0924 0924 0924 0924   Glucose 149 (A)    107 (A)      BUN 12    14      Creatinine 0.90    1.02      eGFR Non  Am 83    73      eGFR  Am 100    84      Sodium 144    141      Potassium 4.8    5.1      Chloride 101    101      Calcium 10.5    10.4 (A)      Total Protein     6.8      Albumin     4.5      Total Bilirubin     0.6      Alkaline Phosphatase     70      AST (SGOT)     22      ALT (SGPT)     22      Total Cholesterol   122    115    Triglycerides   160 (A)    185 (A)    HDL Cholesterol   32 (A)    33 (A)    LDL Cholesterol    62    51    Hemoglobin A1C  6.90 (A)    7.2 (A)     Microalbumin, Urine    11.9    CANCELED   (A) Abnormal value       Comments are available for some flowsheets but are not being displayed.                     Assessment and Plan    Diagnoses and all orders for this visit:    1. Medicare annual wellness visit, subsequent (Primary)    2. Mixed hyperlipidemia  Assessment & Plan:  Lipid abnormalities are improving with treatment.  Pharmacotherapy as ordered.  Lipids will be reassessed in 1 year.  Continue treatment with atorvastatin 20 mg daily      3. Type 2 diabetes mellitus with diabetic polyneuropathy, with long-term current use of insulin (HCC)  Assessment & Plan:  Diabetes is improving with treatment.   Continue current treatment regimen.  Diabetes will be reassessed in 1 year.  He is seeing endocrinology.      4. Acquired hypothyroidism  Assessment & Plan:  Follow-up with endocrinology and continue current treatment.      5. Lumbar radiculopathy  Assessment & Plan:  Improvement  with epidural via Dr. Bhatia noted.  Follow-up with neurosurgery given history of significant pain and prior left laminectomy 1985.      6. Osteoarthritis of spine with radiculopathy, lumbar region  Assessment & Plan:  Improvement with epidural via Dr. Bhatia noted.  Follow-up with neurosurgery given history of significant pain and prior left laminectomy 1985.             Follow Up   Return in about 1 year (around 5/16/2023) for Medicare Wellness.  Patient was given instructions and counseling regarding his condition or for health maintenance advice. Please see specific information pulled into the AVS if appropriate.

## 2022-05-16 NOTE — PROGRESS NOTES
The ABCs of the Annual Wellness Visit  Subsequent Medicare Wellness Visit    Chief Complaint   Patient presents with   • Medicare Wellness-subsequent   • Labs Only     A1c due      Subjective    History of Present Illness:  Andrea Fam is a 72 y.o. male who presents for a Subsequent Medicare Wellness Visit.    The following portions of the patient's history were reviewed and   updated as appropriate: allergies, current medications, past family history, past medical history, past social history, past surgical history and problem list.    Compared to one year ago, the patient feels his physical   health is worse.    Compared to one year ago, the patient feels his mental   health is the same.    Recent Hospitalizations:  He was not admitted to the hospital during the last year.       Current Medical Providers:  Patient Care Team:  Suresh Rolon MD as PCP - General (Family Medicine)  Asya Henao MD as Consulting Physician (Endocrinology)  John Mcguire MD as Consulting Physician (Pulmonary Disease)  Tamia Carrillo MD as Consulting Physician (Ophthalmology)  Jamal Prince MD as Consulting Physician (Orthopedic Surgery)    Outpatient Medications Prior to Visit   Medication Sig Dispense Refill   • atorvastatin (LIPITOR) 20 MG tablet TAKE 1 TABLET DAILY 90 tablet 3   • Blood Glucose Monitoring Suppl (ONETOUCH VERIO) w/Device kit 1 Device As Needed (USE TO TO TEST BLOOD SUGAR 2 TIMES DAILY). 1 kit 0   • Farxiga 10 MG tablet TAKE 1 TABLET DAILY 30 tablet 11   • FOLIC ACID PO Take 400 mcg by mouth 2 (Two) Times a Day. WILL HOLD PRIOR TO SURGERY     • gabapentin (NEURONTIN) 300 MG capsule TAKE 3 CAPSULES IN THE MORNING AND 3 CAPSULES IN THE EVENING 540 capsule 0   • Insulin Glargine, 1 Unit Dial, (Toujeo SoloStar) 300 UNIT/ML solution pen-injector injection Inject 28 Units under the skin into the appropriate area as directed Daily. e11.8 13.5 mL 2   • Insulin Pen Needle (NovoFine Plus) 32G X 4 MM misc Use  to inject insulin 2 times daily 200 each 3   • levothyroxine (SYNTHROID, LEVOTHROID) 50 MCG tablet TAKE 1 TABLET DAILY 90 tablet 3   • metFORMIN (GLUCOPHAGE) 500 MG tablet TAKE 2 TABLETS TWICE A DAY WITH MEALS 360 tablet 3   • Multiple Vitamin (MULTI-VITAMIN DAILY PO) Take 1 tablet by mouth Daily.     • Omega-3 Fatty Acids (FISH OIL) 1000 MG capsule capsule Take 1,200 mg by mouth 2 (Two) Times a Day With Meals.     • ONETOUCH DELICA LANCETS 33G misc USE TO TEST BLOOD SUGAR 2 TIMES DAILY 100 each 5   • ONETOUCH VERIO test strip USE TO TEST BLOOD SUGAR 2 TIMES DAILY 100 each 5   • Trulicity 1.5 MG/0.5ML solution pen-injector INJECT 1.5 MG UNDER THE SKIN INTO THE APPROPRIATE AREA AS DIRECTED ONCE A WEEK 6 mL 3   • vitamin B-12 (CYANOCOBALAMIN) 1000 MCG tablet Take 1,000 mcg by mouth Every Other Day. TAKES ON ODD NUMBER DAYS  WILL HOLD PRIOR TO SURGERY     • vitamin C (ASCORBIC ACID) 500 MG tablet Take 1,000 mg by mouth Daily. PT TO HOLD PRIOR TO SURGERY     • Cholecalciferol (VITAMIN D) 2000 UNITS tablet Take 2,000 Units by mouth 2 (Two) Times a Day.     • DULoxetine (CYMBALTA) 30 MG capsule      • DULoxetine (CYMBALTA) 60 MG capsule Take 1 capsule by mouth Daily. 90 capsule 0     No facility-administered medications prior to visit.       No opioid medication identified on active medication list. I have reviewed chart for other potential  high risk medication/s and harmful drug interactions in the elderly.          Aspirin is not on active medication list.  Aspirin use is not indicated based on review of current medical condition/s. Risk of harm outweighs potential benefits.  .    Patient Active Problem List   Diagnosis   • Rotator cuff insufficiency of right shoulder   • Type 2 diabetes mellitus (HCC)   • Impotence of organic origin   • Peripheral nerve disease   • Sacral pain   • Hypogonadism in male   • LISANDRA (obstructive sleep apnea) on auto CPAP   • Weak urinary stream   • Mixed hyperlipidemia   • Encounter for  "laboratory testing for COVID-19 virus   • HTN (hypertension)   • Hypothyroidism   • Septic shock (HCC)   • Severe sepsis (HCC)   • Lumbar radiculopathy     Advance Care Planning  Advance Directive is on file.  ACP discussion was held with the patient during this visit. Patient has an advance directive in EMR which is still valid.           Objective    Vitals:    22 1555   BP: 118/72   BP Location: Left arm   Patient Position: Sitting   Cuff Size: Adult   Pulse: 81   Temp: 98.4 °F (36.9 °C)   TempSrc: Temporal   SpO2: 96%   Weight: 88.1 kg (194 lb 3.2 oz)   Height: 175.3 cm (69\")   PainSc:   2   PainLoc: Back     BMI Readings from Last 1 Encounters:   22 28.68 kg/m²   BMI is above normal parameters. Recommendations include: nutrition counseling    Does the patient have evidence of cognitive impairment? No    Physical Exam            HEALTH RISK ASSESSMENT    Smoking Status:  Social History     Tobacco Use   Smoking Status Former Smoker   • Packs/day: 1.50   • Years: 30.00   • Pack years: 45.00   • Types: Cigarettes   • Quit date:    • Years since quittin.3   Smokeless Tobacco Never Used   Tobacco Comment    QUIT AGE 48     Alcohol Consumption:  Social History     Substance and Sexual Activity   Alcohol Use Yes    Comment: 2  drinks per  month     Fall Risk Screen:    MAURICE Fall Risk Assessment was completed, and patient is at LOW risk for falls.Assessment completed on:2022    Depression Screening:  PHQ-2/PHQ-9 Depression Screening 2022   Retired PHQ-9 Total Score -   Retired Total Score -   Little Interest or Pleasure in Doing Things 0-->not at all   Feeling Down, Depressed or Hopeless 0-->not at all   PHQ-9: Brief Depression Severity Measure Score 0       Health Habits and Functional and Cognitive Screening:  Functional & Cognitive Status 2022   Do you have difficulty preparing food and eating? No   Do you have difficulty bathing yourself, getting dressed or grooming yourself? " No   Do you have difficulty using the toilet? No   Do you have difficulty moving around from place to place? No   Do you have trouble with steps or getting out of a bed or a chair? No   Current Diet Well Balanced Diet   Dental Exam Up to date   Eye Exam Up to date   Exercise (times per week) 0 times per week   Current Exercises Include No Regular Exercise   Current Exercise Activities Include -   Do you need help using the phone?  No   Are you deaf or do you have serious difficulty hearing?  No   Do you need help with transportation? No   Do you need help shopping? No   Do you need help preparing meals?  No   Do you need help with housework?  No   Do you need help with laundry? No   Do you need help taking your medications? No   Do you need help managing money? No   Do you ever drive or ride in a car without wearing a seat belt? No   Have you felt unusual stress, anger or loneliness in the last month? No   Who do you live with? Spouse   If you need help, do you have trouble finding someone available to you? No   Have you been bothered in the last four weeks by sexual problems? No   Do you have difficulty concentrating, remembering or making decisions? No       Age-appropriate Screening Schedule:  Refer to the list below for future screening recommendations based on patient's age, sex and/or medical conditions. Orders for these recommended tests are listed in the plan section. The patient has been provided with a written plan.    Health Maintenance   Topic Date Due   • ZOSTER VACCINE (2 of 2) 10/27/2017   • DIABETIC FOOT EXAM  03/13/2018   • HEMOGLOBIN A1C  06/30/2022   • INFLUENZA VACCINE  08/01/2022   • URINE MICROALBUMIN  08/10/2022   • LIPID PANEL  12/30/2022   • DIABETIC EYE EXAM  02/22/2023   • TDAP/TD VACCINES (3 - Td or Tdap) 06/08/2028              Assessment & Plan   CMS Preventative Services Quick Reference  Risk Factors Identified During Encounter  Cardiovascular Disease  Fall Risk-High or Moderate  The  above risks/problems have been discussed with the patient.  Follow up actions/plans if indicated are seen below in the Assessment/Plan Section.  Pertinent information has been shared with the patient in the After Visit Summary.    There are no diagnoses linked to this encounter.    Follow Up:   No follow-ups on file.     An After Visit Summary and PPPS were made available to the patient.

## 2022-05-16 NOTE — ASSESSMENT & PLAN NOTE
Diabetes is improving with treatment.   Continue current treatment regimen.  Diabetes will be reassessed in 1 year.  He is seeing endocrinology.

## 2022-05-16 NOTE — ASSESSMENT & PLAN NOTE
Lipid abnormalities are improving with treatment.  Pharmacotherapy as ordered.  Lipids will be reassessed in 1 year.  Continue treatment with atorvastatin 20 mg daily

## 2022-05-16 NOTE — ASSESSMENT & PLAN NOTE
Improvement with epidural via Dr. Bhatia noted.  Follow-up with neurosurgery given history of significant pain and prior left laminectomy 1985.

## 2022-06-21 NOTE — PROGRESS NOTES
Subjective   Patient ID: Andrea Fam is a 72 y.o. male is being seen for consultation today at the request of Suresh Rolon MD for sciatica of right side. He has a recent xray completed 01/11/2022 of the spine lumbar. He reports right sided low back pain that radiates into his hip and down the outside of his right leg. He reports he does have neuropathy, but does feel as though the outside of his calf has increased numbness. He has done physical therapy in the past, that did help for a while but it was not long lasting relief.     Is very healthy man is with his wife.  He had a left L5-S1 microdiscectomy 37 years ago in 1985.  At that time he had severe left buttock and leg pain from herniated disc and did quite well.  About 9 months ago he was lifting some heavy bag of rocks and began having some pain in his right buttock and leg.  It has not gotten any better.  He does not have any significant weakness.  Not much low back pain.  Is almost.  Sciatica.  He was found to have some spinal stenosis at L4-L5 and L5-S1.  There may be some superimposed disc protrusion at L4-L5.  Physical therapy has not helped.  Medicines have not helped.  Blocks have not helped.  We discussed the condition that he has.  I think an open right L4-5, right L5-S1 decompression and possible discectomy at L4-L5 is reasonable.  He is aware of the goals risks and benefits and probably most importantly the risk of recurrent problems in the future requiring surgery.  We will move forward with this is soon as possible.  He is generally healthy and will not need cardiac clearance.    Back Pain  This is a chronic problem. The current episode started more than 1 month ago. The problem occurs constantly. The problem has been gradually worsening since onset. The pain is present in the lumbar spine. The quality of the pain is described as aching. The pain radiates to the right foot, right thigh and right knee. The pain is at a severity of 4/10. The pain  "is mild. The symptoms are aggravated by standing, twisting, position, bending and lying down. Associated symptoms include leg pain, numbness and tingling. Pertinent negatives include no bladder incontinence, bowel incontinence, chest pain, fever or weakness.       The following portions of the patient's history were reviewed and updated as appropriate: allergies, current medications, past family history, past medical history, past social history, past surgical history and problem list.    Review of Systems   Constitutional: Positive for activity change. Negative for fever.   HENT: Negative for congestion.    Eyes: Negative for visual disturbance.   Respiratory: Negative for chest tightness and shortness of breath.    Cardiovascular: Negative for chest pain.   Gastrointestinal: Negative for bowel incontinence, nausea and vomiting.   Endocrine: Negative for cold intolerance and heat intolerance.   Genitourinary: Negative for bladder incontinence and difficulty urinating.   Musculoskeletal: Positive for back pain.   Skin: Negative for wound.   Allergic/Immunologic: Negative for environmental allergies.   Neurological: Positive for tingling and numbness. Negative for weakness.   Hematological: Does not bruise/bleed easily.   Psychiatric/Behavioral: Negative for sleep disturbance.   All other systems reviewed and are negative.          Objective     Vitals:    06/22/22 1403   BP: 122/70   Pulse: 74   Temp: 98 °F (36.7 °C)   SpO2: 94%   Weight: 88.2 kg (194 lb 7.1 oz)   Height: 175.3 cm (69\")     Body mass index is 28.71 kg/m².      Physical Exam  Constitutional:       Appearance: He is well-developed.   HENT:      Head: Normocephalic and atraumatic.   Eyes:      Extraocular Movements: EOM normal.      Conjunctiva/sclera: Conjunctivae normal.      Pupils: Pupils are equal, round, and reactive to light.   Neck:      Vascular: No carotid bruit.   Neurological:      Mental Status: He is oriented to person, place, and time. "      Coordination: Finger-Nose-Finger Test and Heel to Shin Test normal.      Gait: Gait is intact.      Deep Tendon Reflexes:      Reflex Scores:       Tricep reflexes are 2+ on the right side and 2+ on the left side.       Bicep reflexes are 2+ on the right side and 2+ on the left side.       Brachioradialis reflexes are 2+ on the right side and 2+ on the left side.       Patellar reflexes are 2+ on the right side and 2+ on the left side.       Achilles reflexes are 2+ on the right side and 2+ on the left side.  Psychiatric:         Speech: Speech normal.       Neurologic Exam     Mental Status   Oriented to person, place, and time.   Registration of memory: Good recent and remote memory.   Attention: normal. Concentration: normal.   Speech: speech is normal   Level of consciousness: alert  Knowledge: consistent with education.     Cranial Nerves     CN II   Visual fields full to confrontation.   Visual acuity: normal    CN III, IV, VI   Pupils are equal, round, and reactive to light.  Extraocular motions are normal.     CN V   Facial sensation intact.   Right corneal reflex: normal  Left corneal reflex: normal    CN VII   Facial expression full, symmetric.   Right facial weakness: none  Left facial weakness: none    CN VIII   Hearing: intact    CN IX, X   Palate: symmetric    CN XI   Right sternocleidomastoid strength: normal  Left sternocleidomastoid strength: normal    CN XII   Tongue: not atrophic  Tongue deviation: none    Motor Exam   Muscle bulk: normal  Right arm tone: normal  Left arm tone: normal  Right leg tone: normal  Left leg tone: normal    Strength   Strength 5/5 except as noted.     Sensory Exam   Light touch normal.     Gait, Coordination, and Reflexes     Gait  Gait: normal    Coordination   Finger to nose coordination: normal  Heel to shin coordination: normal    Reflexes   Right brachioradialis: 2+  Left brachioradialis: 2+  Right biceps: 2+  Left biceps: 2+  Right triceps: 2+  Left triceps:  2+  Right patellar: 2+  Left patellar: 2+  Right achilles: 2+  Left achilles: 2+  Right : 2+  Left : 2+          Assessment & Plan   Independent Review of Radiographic Studies:      I personally reviewed the images from the following studies.    I reviewed the lumbar MRI done in December 2021 which shows postoperative changes on the left at L5-S1 but mostly severe spinal stenosis at L4L5 and right sided lateral recess stenosis at L5-S1.  Agree with the report.        Medical Decision Making:      I described and recommended an open right L4-L5 and right L5-S1 lumbar decompression.  The goal of surgery is relief of radiating leg pain with improvement of numbness, tingling, walking, and quality of life.  This will not help or hinder low back pain.  The risks include, but are not limited to, infection, hemorrhage on transfusion or reoperation, CSF leak requiring reoperation, incomplete relief of symptoms, potential need for additional surgeries in the future including a fusion, stroke, paralysis, coma, and death.  The patient agrees to proceed.     Diagnoses and all orders for this visit:    1. DDD (degenerative disc disease), lumbar (Primary)  -     Case Request; Standing  -     Case Request    2. Spinal stenosis of lumbar region with neurogenic claudication  -     Case Request; Standing  -     Case Request    3. History of lumbar surgery  -     Case Request; Standing  -     Case Request    Other orders  -     Follow anesthesia standing orders.  -     Obtain informed consent  -     Provide NPO Instructions to Patient; Future  -     Chlorhexidine Skin Prep; Future      Return for 2 weeks after surgery with an APC.

## 2022-06-22 ENCOUNTER — OFFICE VISIT (OUTPATIENT)
Dept: NEUROSURGERY | Facility: CLINIC | Age: 72
End: 2022-06-22

## 2022-06-22 VITALS
WEIGHT: 194.45 LBS | TEMPERATURE: 98 F | HEART RATE: 74 BPM | SYSTOLIC BLOOD PRESSURE: 122 MMHG | OXYGEN SATURATION: 94 % | DIASTOLIC BLOOD PRESSURE: 70 MMHG | BODY MASS INDEX: 28.8 KG/M2 | HEIGHT: 69 IN

## 2022-06-22 DIAGNOSIS — Z98.890 HISTORY OF LUMBAR SURGERY: ICD-10-CM

## 2022-06-22 DIAGNOSIS — M51.36 DDD (DEGENERATIVE DISC DISEASE), LUMBAR: Primary | ICD-10-CM

## 2022-06-22 DIAGNOSIS — M48.062 SPINAL STENOSIS OF LUMBAR REGION WITH NEUROGENIC CLAUDICATION: ICD-10-CM

## 2022-06-22 PROBLEM — M51.369 DDD (DEGENERATIVE DISC DISEASE), LUMBAR: Status: ACTIVE | Noted: 2022-06-22

## 2022-06-22 PROCEDURE — 99204 OFFICE O/P NEW MOD 45 MIN: CPT | Performed by: NEUROLOGICAL SURGERY

## 2022-06-22 RX ORDER — CEFAZOLIN SODIUM 2 G/100ML
2 INJECTION, SOLUTION INTRAVENOUS ONCE
Status: CANCELLED | OUTPATIENT
Start: 2022-07-21 | End: 2022-06-22

## 2022-06-23 ENCOUNTER — TELEPHONE (OUTPATIENT)
Dept: NEUROSURGERY | Facility: CLINIC | Age: 72
End: 2022-06-23

## 2022-07-08 ENCOUNTER — DOCUMENTATION (OUTPATIENT)
Dept: ENDOCRINOLOGY | Age: 72
End: 2022-07-08

## 2022-07-08 NOTE — PROGRESS NOTES
Benefits Investigation Summary    Prescription: Renewal     [facility]    Copay amount: farxiga $106, toujeo 7/19, trulicity 9/6  Pharmacy Payor: RX JUAN ATBAILEY  Pharmacy Plan: RX AETBAILEY  BIN: [id number]  PCN: [id number]  GROUP: [id number]    Prior Auth and Med Assistance notes:

## 2022-07-11 ENCOUNTER — OFFICE VISIT (OUTPATIENT)
Dept: ENDOCRINOLOGY | Age: 72
End: 2022-07-11

## 2022-07-11 ENCOUNTER — TELEPHONE (OUTPATIENT)
Dept: NEUROSURGERY | Facility: CLINIC | Age: 72
End: 2022-07-11

## 2022-07-11 VITALS
DIASTOLIC BLOOD PRESSURE: 62 MMHG | HEART RATE: 86 BPM | SYSTOLIC BLOOD PRESSURE: 122 MMHG | WEIGHT: 197.4 LBS | OXYGEN SATURATION: 95 % | TEMPERATURE: 97.8 F | HEIGHT: 69 IN | BODY MASS INDEX: 29.24 KG/M2

## 2022-07-11 DIAGNOSIS — E03.9 ACQUIRED HYPOTHYROIDISM: ICD-10-CM

## 2022-07-11 DIAGNOSIS — E78.2 MIXED HYPERLIPIDEMIA: ICD-10-CM

## 2022-07-11 DIAGNOSIS — E11.42 TYPE 2 DIABETES MELLITUS WITH PERIPHERAL NEUROPATHY: Primary | ICD-10-CM

## 2022-07-11 PROCEDURE — 99214 OFFICE O/P EST MOD 30 MIN: CPT | Performed by: NURSE PRACTITIONER

## 2022-07-11 RX ORDER — DAPAGLIFLOZIN 10 MG/1
1 TABLET, FILM COATED ORAL DAILY
Qty: 30 TABLET | Refills: 11
Start: 2022-07-11 | End: 2022-10-04 | Stop reason: SDUPTHER

## 2022-07-11 RX ORDER — INSULIN GLARGINE 300 U/ML
34 INJECTION, SOLUTION SUBCUTANEOUS DAILY
Qty: 13.5 ML | Refills: 0 | Status: SHIPPED | OUTPATIENT
Start: 2022-07-11 | End: 2022-10-25

## 2022-07-11 NOTE — TELEPHONE ENCOUNTER
Pt is scheduled for surgery with Dr GOFF on 7/21/22.   He lad labs drawn on 7/5/22 and his AIC was 8.2% and his BS was 169.  Please advise.  [phone]

## 2022-07-11 NOTE — PROGRESS NOTES
"Chief Complaint  Diabetes (Type 2)    Subjective        Andrea Fam presents to [facility] ENDOCRINOLOGY  History of Present Illness     Upcoming back surgery, less active, more pain, steroids--> worsening hyperglycemia  Lab Results   Component Value Date    HGBA1C 8.3 (H) 07/05/2022          Type 2 dm    Diagnosed about 10+ years ago.   Today in clinic pt reports being on metformin 1000 mg twice daily, Toujeo 28 units daily, Farxiga 5 mg oral daily  Farxiga 10mg caused to increased urination.  on Trulicity now at 1.5mg daily   Checks BG -no  Dm retinopathy -no history, Last eye exam -up-to-date 2022  Dm nephropathy -no  Dm neuropathy -yes, Dm neuropathy meds -on Neurontin  CAD -x  CVA -x  Episodes of hypoglycemia - x  Pt is physically active. weight has been stable.   Pt tries to follow DM diet for most part.   On Ace inb.     Hypothyroidism  On levothyroxine 50 mcg oral daily  Lab Results   Component Value Date    TSH 0.996 07/05/2022        Hyperlipidemia  On Lipitor 20mg   Lab Results   Component Value Date    CHLPL 107 07/05/2022    TRIG 163 (H) 07/05/2022    HDL 31 (L) 07/05/2022    LDL 48 07/05/2022       Objective   Vital Signs:  /62   Pulse 86   Temp 97.8 °F (36.6 °C)   Ht 175.3 cm (69.02\")   Wt 89.5 kg (197 lb 6.4 oz)   SpO2 95%   BMI 29.14 kg/m²   Estimated body mass index is 29.14 kg/m² as calculated from the following:    Height as of this encounter: 175.3 cm (69.02\").    Weight as of this encounter: 89.5 kg (197 lb 6.4 oz).          Physical Exam  Vitals reviewed.   Constitutional:       General: He is not in acute distress.  HENT:      Head: Normocephalic and atraumatic.   Cardiovascular:      Rate and Rhythm: Normal rate and regular rhythm.   Pulmonary:      Effort: Pulmonary effort is normal. No respiratory distress.   Musculoskeletal:         General: No signs of injury. Normal range of motion.      Cervical back: Normal range of motion and neck supple.   Skin:     " General: Skin is warm and dry.   Neurological:      Mental Status: He is alert and oriented to person, place, and time. Mental status is at baseline.   Psychiatric:         Mood and Affect: Mood normal.         Behavior: Behavior normal.         Thought Content: Thought content normal.         Judgment: Judgment normal.        Result Review :  The following data was reviewed by: NATALIE Hernandez on 07/11/2022:  Common labs    Common Labsle 8/10/21 8/10/21 8/10/21 8/10/21 12/30/21 12/30/21 12/30/21 12/30/21 7/5/22 7/5/22 7/5/22    1013 1013 1013 1013 0924 0924 0924 0924 0931 0931 0931   Glucose 149 (A)    107 (A)    169 (A)     BUN 12    14    11     Creatinine 0.90    1.02    0.92     eGFR Non  Am 83    73         eGFR  Am 100    84         Sodium 144    141    142     Potassium 4.8    5.1    5.0     Chloride 101    101    102     Calcium 10.5    10.4 (A)    10.4 (A)     Total Protein     6.8         Albumin     4.5         Total Bilirubin     0.6         Alkaline Phosphatase     70         AST (SGOT)     22         ALT (SGPT)     22         Total Cholesterol   122    115    107   Triglycerides   160 (A)    185 (A)    163 (A)   HDL Cholesterol   32 (A)    33 (A)    31 (A)   LDL Cholesterol    62    51    48   Hemoglobin A1C  6.90 (A)    7.2 (A)    8.3 (A)    Microalbumin, Urine    11.9    CANCELED      (A) Abnormal value       Comments are available for some flowsheets but are not being displayed.                     Assessment and Plan   Diagnoses and all orders for this visit:    1. Type 2 diabetes mellitus with peripheral neuropathy (HCC) (Primary)    2. Mixed hyperlipidemia    3. Acquired hypothyroidism    Other orders  -     Insulin Glargine, 1 Unit Dial, (Toujeo SoloStar) 300 UNIT/ML solution pen-injector injection; Inject 34 Units under the skin into the appropriate area as directed Daily. e11.8  Dispense: 13.5 mL; Refill: 0  -     dapagliflozin Propanediol (Farxiga) 10 MG tablet; Take 10 mg  by mouth Daily. Take 5mg daily, half tablet  Dispense: 30 tablet; Refill: 11             Follow Up   Return in about 3 months (around 10/11/2022).   A1c worse  Increase toujeo to 34u daily   Check BS 3x/day leading up to surgery  Will discuss surgical clearance with suregon and let us know- caution with a1c elevation discussed with patient, risk for benefit including pain, steroids vs surgery with higher blood sugars. Most likely surgery will improve hyperglycemia but caution should be taken to prevent diabetic complication  May need to consider Humalog sliding scale preop, Intra-Op and postop to prevent surgical complications from hyperglycemia    TSH labs stable, continue current T4 dosing  LDL at goal, continue statin    Patient was given instructions and counseling regarding his condition or for health maintenance advice. Please see specific information pulled into the AVS if appropriate.     NATALIE Hernandez

## 2022-07-19 ENCOUNTER — PRE-ADMISSION TESTING (OUTPATIENT)
Dept: PREADMISSION TESTING | Facility: HOSPITAL | Age: 72
End: 2022-07-19

## 2022-07-19 ENCOUNTER — TELEPHONE (OUTPATIENT)
Dept: NEUROSURGERY | Facility: CLINIC | Age: 72
End: 2022-07-19

## 2022-07-19 VITALS
HEIGHT: 70 IN | WEIGHT: 195 LBS | BODY MASS INDEX: 27.92 KG/M2 | TEMPERATURE: 97.2 F | RESPIRATION RATE: 20 BRPM | OXYGEN SATURATION: 96 % | SYSTOLIC BLOOD PRESSURE: 142 MMHG | DIASTOLIC BLOOD PRESSURE: 81 MMHG | HEART RATE: 78 BPM

## 2022-07-19 LAB
ANION GAP SERPL CALCULATED.3IONS-SCNC: 11 MMOL/L (ref 5–15)
BUN SERPL-MCNC: 14 MG/DL (ref 8–23)
BUN/CREAT SERPL: 15.4 (ref 7–25)
CALCIUM SPEC-SCNC: 9.9 MG/DL (ref 8.6–10.5)
CHLORIDE SERPL-SCNC: 103 MMOL/L (ref 98–107)
CO2 SERPL-SCNC: 27 MMOL/L (ref 22–29)
CREAT SERPL-MCNC: 0.91 MG/DL (ref 0.76–1.27)
DEPRECATED RDW RBC AUTO: 43.7 FL (ref 37–54)
EGFRCR SERPLBLD CKD-EPI 2021: 88.4 ML/MIN/1.73
ERYTHROCYTE [DISTWIDTH] IN BLOOD BY AUTOMATED COUNT: 12.9 % (ref 12.3–15.4)
GLUCOSE SERPL-MCNC: 120 MG/DL (ref 65–99)
HCT VFR BLD AUTO: 42.9 % (ref 37.5–51)
HGB BLD-MCNC: 14.3 G/DL (ref 13–17.7)
MCH RBC QN AUTO: 31.2 PG (ref 26.6–33)
MCHC RBC AUTO-ENTMCNC: 33.3 G/DL (ref 31.5–35.7)
MCV RBC AUTO: 93.7 FL (ref 79–97)
PLATELET # BLD AUTO: 222 10*3/MM3 (ref 140–450)
PMV BLD AUTO: 10 FL (ref 6–12)
POTASSIUM SERPL-SCNC: 4.1 MMOL/L (ref 3.5–5.2)
RBC # BLD AUTO: 4.58 10*6/MM3 (ref 4.14–5.8)
SARS-COV-2 ORF1AB RESP QL NAA+PROBE: NOT DETECTED
SODIUM SERPL-SCNC: 141 MMOL/L (ref 136–145)
WBC NRBC COR # BLD: 4.92 10*3/MM3 (ref 3.4–10.8)

## 2022-07-19 PROCEDURE — 93010 ELECTROCARDIOGRAM REPORT: CPT | Performed by: INTERNAL MEDICINE

## 2022-07-19 PROCEDURE — C9803 HOPD COVID-19 SPEC COLLECT: HCPCS

## 2022-07-19 PROCEDURE — 80048 BASIC METABOLIC PNL TOTAL CA: CPT

## 2022-07-19 PROCEDURE — 36415 COLL VENOUS BLD VENIPUNCTURE: CPT

## 2022-07-19 PROCEDURE — 93005 ELECTROCARDIOGRAM TRACING: CPT

## 2022-07-19 PROCEDURE — U0004 COV-19 TEST NON-CDC HGH THRU: HCPCS

## 2022-07-19 PROCEDURE — 85027 COMPLETE CBC AUTOMATED: CPT

## 2022-07-19 RX ORDER — CHLORHEXIDINE GLUCONATE 500 MG/1
1 CLOTH TOPICAL
COMMUNITY
End: 2022-07-31 | Stop reason: HOSPADM

## 2022-07-19 RX ORDER — MULTIVIT-MIN/IRON/FOLIC ACID/K 18-600-40
2 CAPSULE ORAL DAILY
COMMUNITY

## 2022-07-19 NOTE — DISCHARGE INSTRUCTIONS
Take the following medications the morning of surgery:    Gabapentin   thyroid med    If you are on prescription narcotic pain medication to control your pain you may also take that medication the morning of surgery.    General Instructions:  Do not eat solid food after midnight the night before surgery.  You may drink clear liquids day of surgery but must stop at least one hour before your hospital arrival time.  It is beneficial for you to have a clear drink that contains carbohydrates the day of surgery.  We suggest a 12 to 20 ounce bottle of Gatorade or Powerade for non-diabetic patients or a 12 to 20 ounce bottle of G2 or Powerade Zero for diabetic patients. (Pediatric patients, are not advised to drink a 12 to 20 ounce carbohydrate drink)    Clear liquids are liquids you can see through.  Nothing red in color.     Plain water                               Sports drinks  Sodas                                   Gelatin (Jell-O)  Fruit juices without pulp such as white grape juice and apple juice  Popsicles that contain no fruit or yogurt  Tea or coffee (no cream or milk added)  Gatorade / Powerade  G2 / Powerade Zero    Infants may have breast milk up to four hours before surgery.  Infants drinking formula may drink formula up to six hours before surgery.   Patients who avoid smoking, chewing tobacco and alcohol for 4 weeks prior to surgery have a reduced risk of post-operative complications.  Quit smoking as many days before surgery as you can.  Do not smoke, use chewing tobacco or drink alcohol the day of surgery.   If applicable bring your C-PAP/ BI-PAP machine.  Bring any papers given to you in the doctor’s office.  Wear clean comfortable clothes.  Do not wear contact lenses, false eyelashes or make-up.  Bring a case for your glasses.   Bring crutches or walker if applicable.  Remove all piercings.  Leave jewelry and any other valuables at home.  Hair extensions with metal clips must be removed prior to  surgery.  The Pre-Admission Testing nurse will instruct you to bring medications if unable to obtain an accurate list in Pre-Admission Testing.        If you were given a blood bank ID arm band remember to bring it with you the day of surgery.    Preventing a Surgical Site Infection:  For 2 to 3 days before surgery, avoid shaving with a razor because the razor can irritate skin and make it easier to develop an infection.    Any areas of open skin can increase the risk of a post-operative wound infection by allowing bacteria to enter and travel throughout the body.  Notify your surgeon if you have any skin wounds / rashes even if it is not near the expected surgical site.  The area will need assessed to determine if surgery should be delayed until it is healed.  The night prior to surgery shower using a fresh bar of anti-bacterial soap (such as Dial) and clean washcloth.  Sleep in a clean bed with clean clothing.  Do not allow pets to sleep with you.  Shower on the morning of surgery using a fresh bar of anti-bacterial soap (such as Dial) and clean washcloth.  Dry with a clean towel and dress in clean clothing.  Ask your surgeon if you will be receiving antibiotics prior to surgery.  Make sure you, your family, and all healthcare providers clean their hands with soap and water or an alcohol based hand  before caring for you or your wound.    Day of surgery:7/21/2022   0830  Your arrival time is approximately two hours before your scheduled surgery time.  Upon arrival, a Pre-op nurse and Anesthesiologist will review your health history, obtain vital signs, and answer questions you may have.  The only belongings needed at this time will be a list of your home medications and if applicable your C-PAP/BI-PAP machine.  A Pre-op nurse will start an IV and you may receive medication in preparation for surgery, including something to help you relax.     Please be aware that surgery does come with discomfort.  We  want to make every effort to control your discomfort so please discuss any uncontrolled symptoms with your nurse.   Your doctor will most likely have prescribed pain medications.      If you are going home after surgery you will receive individualized written care instructions before being discharged.  A responsible adult must drive you to and from the hospital on the day of your surgery and stay with you for 24 hours.  Discharge prescriptions can be filled by the hospital pharmacy during regular pharmacy hours.  If you are having surgery late in the day/evening your prescription may be e-prescribed to your pharmacy.  Please verify your pharmacy hours or chose a 24 hour pharmacy to avoid not having access to your prescription because your pharmacy has closed for the day.    If you are staying overnight following surgery, you will be transported to your hospital room following the recovery period.  [facility] has all private rooms.    If you have any questions please call Pre-Admission Testing at [phone].  Deductibles and co-payments are collected on the day of service. Please be prepared to pay the required co-pay, deductible or deposit on the day of service as defined by your plan.    Patient Education for Self-Quarantine Process    Following your COVID testing, we strongly recommend that you wear a mask when you are with other people and practice social distancing.   Limit your activities to only required outings.  Wash your hands with soap and water frequently for at least 20 seconds.   Avoid touching your eyes, nose and mouth with unwashed hands.  Do not share anything - utensils, drinking glasses, food from the same bowl.   Sanitize household surfaces daily. Include all high touch areas (door handles, light switches, phones, countertops, etc.)    Call your surgeon immediately if you experience any of the following symptoms:  Sore Throat  Shortness of Breath or difficulty  breathing  Cough  Chills  Body soreness or muscle pain  Headache  Fever  New loss of taste or smell  Do not arrive for your surgery ill.  Your procedure will need to be rescheduled to another time.  You will need to call your physician before the day of surgery to avoid any unnecessary exposure to hospital staff as well as other patients.   CHLORHEXIDINE CLOTH INSTRUCTIONS  The morning of surgery follow these instructions using the Chlorhexidine cloths you've been given.  These steps reduce bacteria on the body.  Do not use the cloths near your eyes, ears mouth, genitalia or on open wounds.  Throw the cloths away after use but do not try to flush them down a toilet.      Open and remove one cloth at a time from the package.    Leave the cloth unfolded and begin the bathing.  Massage the skin with the cloths using gentle pressure to remove bacteria.  Do not scrub harshly.   Follow the steps below with one 2% CHG cloth per area (6 total cloths).  One cloth for neck, shoulders and chest.  One cloth for both arms, hands, fingers and underarms (do underarms last).  One cloth for the abdomen followed by groin.  One cloth for right leg and foot including between the toes.  One cloth for left leg and foot including between the toes.  The last cloth is to be used for the back of the neck, back and buttocks.    Allow the CHG to air dry 3 minutes on the skin which will give it time to work and decrease the chance of irritation.  The skin may feel sticky until it is dry.  Do not rinse with water or any other liquid or you will lose the beneficial effects of the CHG.  If mild skin irritation occurs, do rinse the skin to remove the CHG.  Report this to the nurse at time of admission.  Do not apply lotions, creams, ointments, deodorants or perfumes after using the clothes. Dress in clean clothes before coming to the hospital.

## 2022-07-20 ENCOUNTER — TRANSCRIBE ORDERS (OUTPATIENT)
Dept: ADMINISTRATIVE | Facility: HOSPITAL | Age: 72
End: 2022-07-20

## 2022-07-20 DIAGNOSIS — Z01.818 OTHER SPECIFIED PRE-OPERATIVE EXAMINATION: Primary | ICD-10-CM

## 2022-07-20 LAB — QT INTERVAL: 398 MS

## 2022-07-26 ENCOUNTER — LAB (OUTPATIENT)
Dept: LAB | Facility: HOSPITAL | Age: 72
End: 2022-07-26

## 2022-07-26 DIAGNOSIS — Z01.818 OTHER SPECIFIED PRE-OPERATIVE EXAMINATION: ICD-10-CM

## 2022-07-26 LAB — SARS-COV-2 ORF1AB RESP QL NAA+PROBE: NOT DETECTED

## 2022-07-26 PROCEDURE — C9803 HOPD COVID-19 SPEC COLLECT: HCPCS

## 2022-07-26 PROCEDURE — U0004 COV-19 TEST NON-CDC HGH THRU: HCPCS

## 2022-07-28 ENCOUNTER — APPOINTMENT (OUTPATIENT)
Dept: GENERAL RADIOLOGY | Facility: HOSPITAL | Age: 72
End: 2022-07-28

## 2022-07-28 ENCOUNTER — HOSPITAL ENCOUNTER (INPATIENT)
Facility: HOSPITAL | Age: 72
LOS: 3 days | Discharge: HOME HEALTH | End: 2022-07-31
Attending: NEUROLOGICAL SURGERY | Admitting: NEUROLOGICAL SURGERY

## 2022-07-28 ENCOUNTER — ANESTHESIA EVENT (OUTPATIENT)
Dept: PERIOP | Facility: HOSPITAL | Age: 72
End: 2022-07-28

## 2022-07-28 ENCOUNTER — ANESTHESIA (OUTPATIENT)
Dept: PERIOP | Facility: HOSPITAL | Age: 72
End: 2022-07-28

## 2022-07-28 DIAGNOSIS — M51.36 DDD (DEGENERATIVE DISC DISEASE), LUMBAR: ICD-10-CM

## 2022-07-28 DIAGNOSIS — Z98.890 HISTORY OF LUMBAR SURGERY: ICD-10-CM

## 2022-07-28 DIAGNOSIS — M48.062 SPINAL STENOSIS OF LUMBAR REGION WITH NEUROGENIC CLAUDICATION: ICD-10-CM

## 2022-07-28 LAB
GLUCOSE BLDC GLUCOMTR-MCNC: 121 MG/DL (ref 70–130)
GLUCOSE BLDC GLUCOMTR-MCNC: 147 MG/DL (ref 70–130)
GLUCOSE BLDC GLUCOMTR-MCNC: 192 MG/DL (ref 70–130)

## 2022-07-28 PROCEDURE — 25010000002 MIDAZOLAM PER 1 MG: Performed by: ANESTHESIOLOGY

## 2022-07-28 PROCEDURE — 25010000002 CEFAZOLIN IN DEXTROSE 2-4 GM/100ML-% SOLUTION: Performed by: NEUROLOGICAL SURGERY

## 2022-07-28 PROCEDURE — 25010000002 FENTANYL CITRATE (PF) 50 MCG/ML SOLUTION: Performed by: NURSE ANESTHETIST, CERTIFIED REGISTERED

## 2022-07-28 PROCEDURE — 63047 LAM FACETEC & FORAMOT LUMBAR: CPT | Performed by: NEUROLOGICAL SURGERY

## 2022-07-28 PROCEDURE — 82962 GLUCOSE BLOOD TEST: CPT

## 2022-07-28 PROCEDURE — 25010000002 DEXAMETHASONE PER 1 MG: Performed by: NURSE ANESTHETIST, CERTIFIED REGISTERED

## 2022-07-28 PROCEDURE — 0SB20ZZ EXCISION OF LUMBAR VERTEBRAL DISC, OPEN APPROACH: ICD-10-PCS | Performed by: NEUROLOGICAL SURGERY

## 2022-07-28 PROCEDURE — 01NB0ZZ RELEASE LUMBAR NERVE, OPEN APPROACH: ICD-10-PCS | Performed by: NEUROLOGICAL SURGERY

## 2022-07-28 PROCEDURE — 63048 LAM FACETEC &FORAMOT EA ADDL: CPT | Performed by: SPECIALIST/TECHNOLOGIST, OTHER

## 2022-07-28 PROCEDURE — 00QT0ZZ REPAIR SPINAL MENINGES, OPEN APPROACH: ICD-10-PCS | Performed by: NEUROLOGICAL SURGERY

## 2022-07-28 PROCEDURE — 25010000002 PROPOFOL 10 MG/ML EMULSION: Performed by: NURSE ANESTHETIST, CERTIFIED REGISTERED

## 2022-07-28 PROCEDURE — 25010000002 ONDANSETRON PER 1 MG: Performed by: NURSE ANESTHETIST, CERTIFIED REGISTERED

## 2022-07-28 PROCEDURE — 25010000002 CEFAZOLIN PER 500 MG: Performed by: NEUROLOGICAL SURGERY

## 2022-07-28 PROCEDURE — 63047 LAM FACETEC & FORAMOT LUMBAR: CPT | Performed by: SPECIALIST/TECHNOLOGIST, OTHER

## 2022-07-28 PROCEDURE — 25010000002 FENTANYL CITRATE (PF) 50 MCG/ML SOLUTION: Performed by: ANESTHESIOLOGY

## 2022-07-28 PROCEDURE — 72020 X-RAY EXAM OF SPINE 1 VIEW: CPT

## 2022-07-28 PROCEDURE — 01NR0ZZ RELEASE SACRAL NERVE, OPEN APPROACH: ICD-10-PCS | Performed by: NEUROLOGICAL SURGERY

## 2022-07-28 PROCEDURE — C1713 ANCHOR/SCREW BN/BN,TIS/BN: HCPCS | Performed by: NEUROLOGICAL SURGERY

## 2022-07-28 PROCEDURE — 25010000002 NEOSTIGMINE 5 MG/10ML SOLUTION: Performed by: NURSE ANESTHETIST, CERTIFIED REGISTERED

## 2022-07-28 PROCEDURE — 76000 FLUOROSCOPY <1 HR PHYS/QHP: CPT

## 2022-07-28 PROCEDURE — 63048 LAM FACETEC &FORAMOT EA ADDL: CPT | Performed by: NEUROLOGICAL SURGERY

## 2022-07-28 DEVICE — SEALANT WND FIBRIN TISSEEL PREFIL/SYR/PRIMAFZ 10ML: Type: IMPLANTABLE DEVICE | Site: SPINE LUMBAR | Status: FUNCTIONAL

## 2022-07-28 DEVICE — FLOSEAL HEMOSTATIC MATRIX, 10ML
Type: IMPLANTABLE DEVICE | Site: SPINE LUMBAR | Status: FUNCTIONAL
Brand: FLOSEAL HEMOSTATIC MATRIX

## 2022-07-28 DEVICE — SSC BONE WAX
Type: IMPLANTABLE DEVICE | Site: SPINE LUMBAR | Status: FUNCTIONAL
Brand: SSC BONE WAX

## 2022-07-28 RX ORDER — DOCUSATE SODIUM 100 MG/1
100 CAPSULE, LIQUID FILLED ORAL 2 TIMES DAILY PRN
Status: DISCONTINUED | OUTPATIENT
Start: 2022-07-28 | End: 2022-07-31 | Stop reason: HOSPADM

## 2022-07-28 RX ORDER — GLYCOPYRROLATE 0.2 MG/ML
INJECTION INTRAMUSCULAR; INTRAVENOUS AS NEEDED
Status: DISCONTINUED | OUTPATIENT
Start: 2022-07-28 | End: 2022-07-28 | Stop reason: SURG

## 2022-07-28 RX ORDER — ONDANSETRON 2 MG/ML
4 INJECTION INTRAMUSCULAR; INTRAVENOUS EVERY 6 HOURS PRN
Status: DISCONTINUED | OUTPATIENT
Start: 2022-07-28 | End: 2022-07-31 | Stop reason: HOSPADM

## 2022-07-28 RX ORDER — KETAMINE HYDROCHLORIDE 10 MG/ML
INJECTION INTRAMUSCULAR; INTRAVENOUS AS NEEDED
Status: DISCONTINUED | OUTPATIENT
Start: 2022-07-28 | End: 2022-07-28 | Stop reason: SURG

## 2022-07-28 RX ORDER — SODIUM CHLORIDE 0.9 % (FLUSH) 0.9 %
10 SYRINGE (ML) INJECTION EVERY 12 HOURS SCHEDULED
Status: DISCONTINUED | OUTPATIENT
Start: 2022-07-28 | End: 2022-07-31 | Stop reason: HOSPADM

## 2022-07-28 RX ORDER — PROPOFOL 10 MG/ML
VIAL (ML) INTRAVENOUS AS NEEDED
Status: DISCONTINUED | OUTPATIENT
Start: 2022-07-28 | End: 2022-07-28 | Stop reason: SURG

## 2022-07-28 RX ORDER — DEXAMETHASONE SODIUM PHOSPHATE 10 MG/ML
INJECTION INTRAMUSCULAR; INTRAVENOUS AS NEEDED
Status: DISCONTINUED | OUTPATIENT
Start: 2022-07-28 | End: 2022-07-28 | Stop reason: SURG

## 2022-07-28 RX ORDER — OXYCODONE AND ACETAMINOPHEN 7.5; 325 MG/1; MG/1
1 TABLET ORAL EVERY 4 HOURS PRN
Status: DISCONTINUED | OUTPATIENT
Start: 2022-07-28 | End: 2022-07-28 | Stop reason: HOSPADM

## 2022-07-28 RX ORDER — LEVOTHYROXINE SODIUM 0.05 MG/1
50 TABLET ORAL DAILY
Status: DISCONTINUED | OUTPATIENT
Start: 2022-07-29 | End: 2022-07-31 | Stop reason: HOSPADM

## 2022-07-28 RX ORDER — MIDAZOLAM HYDROCHLORIDE 1 MG/ML
0.5 INJECTION INTRAMUSCULAR; INTRAVENOUS
Status: DISCONTINUED | OUTPATIENT
Start: 2022-07-28 | End: 2022-07-28 | Stop reason: HOSPADM

## 2022-07-28 RX ORDER — DIPHENHYDRAMINE HYDROCHLORIDE 50 MG/ML
12.5 INJECTION INTRAMUSCULAR; INTRAVENOUS
Status: DISCONTINUED | OUTPATIENT
Start: 2022-07-28 | End: 2022-07-28 | Stop reason: HOSPADM

## 2022-07-28 RX ORDER — NICOTINE POLACRILEX 4 MG
15 LOZENGE BUCCAL
Status: DISCONTINUED | OUTPATIENT
Start: 2022-07-28 | End: 2022-07-31 | Stop reason: HOSPADM

## 2022-07-28 RX ORDER — FAMOTIDINE 10 MG/ML
20 INJECTION, SOLUTION INTRAVENOUS ONCE
Status: COMPLETED | OUTPATIENT
Start: 2022-07-28 | End: 2022-07-28

## 2022-07-28 RX ORDER — ATORVASTATIN CALCIUM 20 MG/1
20 TABLET, FILM COATED ORAL DAILY
Status: DISCONTINUED | OUTPATIENT
Start: 2022-07-28 | End: 2022-07-31 | Stop reason: HOSPADM

## 2022-07-28 RX ORDER — EPHEDRINE SULFATE 50 MG/ML
5 INJECTION, SOLUTION INTRAVENOUS ONCE AS NEEDED
Status: DISCONTINUED | OUTPATIENT
Start: 2022-07-28 | End: 2022-07-28 | Stop reason: HOSPADM

## 2022-07-28 RX ORDER — FENTANYL CITRATE 50 UG/ML
50 INJECTION, SOLUTION INTRAMUSCULAR; INTRAVENOUS
Status: DISCONTINUED | OUTPATIENT
Start: 2022-07-28 | End: 2022-07-28 | Stop reason: HOSPADM

## 2022-07-28 RX ORDER — CEFAZOLIN SODIUM 2 G/100ML
2 INJECTION, SOLUTION INTRAVENOUS ONCE
Status: COMPLETED | OUTPATIENT
Start: 2022-07-28 | End: 2022-07-28

## 2022-07-28 RX ORDER — EPHEDRINE SULFATE 50 MG/ML
INJECTION, SOLUTION INTRAVENOUS AS NEEDED
Status: DISCONTINUED | OUTPATIENT
Start: 2022-07-28 | End: 2022-07-28 | Stop reason: SURG

## 2022-07-28 RX ORDER — ONDANSETRON 2 MG/ML
4 INJECTION INTRAMUSCULAR; INTRAVENOUS ONCE AS NEEDED
Status: DISCONTINUED | OUTPATIENT
Start: 2022-07-28 | End: 2022-07-28 | Stop reason: HOSPADM

## 2022-07-28 RX ORDER — LABETALOL HYDROCHLORIDE 5 MG/ML
5 INJECTION, SOLUTION INTRAVENOUS
Status: DISCONTINUED | OUTPATIENT
Start: 2022-07-28 | End: 2022-07-28 | Stop reason: HOSPADM

## 2022-07-28 RX ORDER — SODIUM CHLORIDE 0.9 % (FLUSH) 0.9 %
3 SYRINGE (ML) INJECTION EVERY 12 HOURS SCHEDULED
Status: DISCONTINUED | OUTPATIENT
Start: 2022-07-28 | End: 2022-07-28 | Stop reason: HOSPADM

## 2022-07-28 RX ORDER — SODIUM CHLORIDE, SODIUM LACTATE, POTASSIUM CHLORIDE, CALCIUM CHLORIDE 600; 310; 30; 20 MG/100ML; MG/100ML; MG/100ML; MG/100ML
9 INJECTION, SOLUTION INTRAVENOUS CONTINUOUS
Status: DISCONTINUED | OUTPATIENT
Start: 2022-07-28 | End: 2022-07-31 | Stop reason: HOSPADM

## 2022-07-28 RX ORDER — BUPIVACAINE HYDROCHLORIDE AND EPINEPHRINE 2.5; 5 MG/ML; UG/ML
INJECTION, SOLUTION EPIDURAL; INFILTRATION; INTRACAUDAL; PERINEURAL AS NEEDED
Status: DISCONTINUED | OUTPATIENT
Start: 2022-07-28 | End: 2022-07-28 | Stop reason: HOSPADM

## 2022-07-28 RX ORDER — LANOLIN ALCOHOL/MO/W.PET/CERES
400 CREAM (GRAM) TOPICAL 2 TIMES DAILY
Status: DISCONTINUED | OUTPATIENT
Start: 2022-07-28 | End: 2022-07-31 | Stop reason: HOSPADM

## 2022-07-28 RX ORDER — DIPHENHYDRAMINE HCL 25 MG
25 CAPSULE ORAL
Status: DISCONTINUED | OUTPATIENT
Start: 2022-07-28 | End: 2022-07-28 | Stop reason: HOSPADM

## 2022-07-28 RX ORDER — FENTANYL CITRATE 50 UG/ML
INJECTION, SOLUTION INTRAMUSCULAR; INTRAVENOUS AS NEEDED
Status: DISCONTINUED | OUTPATIENT
Start: 2022-07-28 | End: 2022-07-28 | Stop reason: SURG

## 2022-07-28 RX ORDER — LIDOCAINE HYDROCHLORIDE 10 MG/ML
0.5 INJECTION, SOLUTION EPIDURAL; INFILTRATION; INTRACAUDAL; PERINEURAL ONCE AS NEEDED
Status: DISCONTINUED | OUTPATIENT
Start: 2022-07-28 | End: 2022-07-28 | Stop reason: HOSPADM

## 2022-07-28 RX ORDER — ROCURONIUM BROMIDE 10 MG/ML
INJECTION, SOLUTION INTRAVENOUS AS NEEDED
Status: DISCONTINUED | OUTPATIENT
Start: 2022-07-28 | End: 2022-07-28 | Stop reason: SURG

## 2022-07-28 RX ORDER — CEFAZOLIN SODIUM 2 G/100ML
2 INJECTION, SOLUTION INTRAVENOUS ONCE
Status: DISCONTINUED | OUTPATIENT
Start: 2022-07-28 | End: 2022-07-28

## 2022-07-28 RX ORDER — HYDROCODONE BITARTRATE AND ACETAMINOPHEN 7.5; 325 MG/1; MG/1
2 TABLET ORAL EVERY 4 HOURS PRN
Status: DISCONTINUED | OUTPATIENT
Start: 2022-07-28 | End: 2022-07-31 | Stop reason: HOSPADM

## 2022-07-28 RX ORDER — GABAPENTIN 300 MG/1
900 CAPSULE ORAL 2 TIMES DAILY
Status: DISCONTINUED | OUTPATIENT
Start: 2022-07-28 | End: 2022-07-31 | Stop reason: HOSPADM

## 2022-07-28 RX ORDER — PROMETHAZINE HYDROCHLORIDE 25 MG/1
25 TABLET ORAL ONCE AS NEEDED
Status: DISCONTINUED | OUTPATIENT
Start: 2022-07-28 | End: 2022-07-28 | Stop reason: HOSPADM

## 2022-07-28 RX ORDER — HYDROCODONE BITARTRATE AND ACETAMINOPHEN 7.5; 325 MG/1; MG/1
1 TABLET ORAL EVERY 4 HOURS PRN
Status: DISCONTINUED | OUTPATIENT
Start: 2022-07-28 | End: 2022-07-31 | Stop reason: HOSPADM

## 2022-07-28 RX ORDER — LIDOCAINE HYDROCHLORIDE 20 MG/ML
INJECTION, SOLUTION INFILTRATION; PERINEURAL AS NEEDED
Status: DISCONTINUED | OUTPATIENT
Start: 2022-07-28 | End: 2022-07-28 | Stop reason: SURG

## 2022-07-28 RX ORDER — FIBRINOGEN HUMAN AND THROMBIN HUMAN 10 ML
KIT TOPICAL AS NEEDED
Status: DISCONTINUED | OUTPATIENT
Start: 2022-07-28 | End: 2022-07-28 | Stop reason: HOSPADM

## 2022-07-28 RX ORDER — DEXTROSE MONOHYDRATE 25 G/50ML
25 INJECTION, SOLUTION INTRAVENOUS
Status: DISCONTINUED | OUTPATIENT
Start: 2022-07-28 | End: 2022-07-31 | Stop reason: HOSPADM

## 2022-07-28 RX ORDER — SODIUM CHLORIDE 0.9 % (FLUSH) 0.9 %
3-10 SYRINGE (ML) INJECTION AS NEEDED
Status: DISCONTINUED | OUTPATIENT
Start: 2022-07-28 | End: 2022-07-28 | Stop reason: HOSPADM

## 2022-07-28 RX ORDER — ACETAMINOPHEN 325 MG/1
650 TABLET ORAL EVERY 4 HOURS PRN
Status: DISCONTINUED | OUTPATIENT
Start: 2022-07-28 | End: 2022-07-31 | Stop reason: HOSPADM

## 2022-07-28 RX ORDER — SODIUM CHLORIDE, SODIUM LACTATE, POTASSIUM CHLORIDE, CALCIUM CHLORIDE 600; 310; 30; 20 MG/100ML; MG/100ML; MG/100ML; MG/100ML
100 INJECTION, SOLUTION INTRAVENOUS CONTINUOUS
Status: DISCONTINUED | OUTPATIENT
Start: 2022-07-28 | End: 2022-07-31 | Stop reason: HOSPADM

## 2022-07-28 RX ORDER — HYDROCODONE BITARTRATE AND ACETAMINOPHEN 7.5; 325 MG/1; MG/1
1 TABLET ORAL ONCE AS NEEDED
Status: DISCONTINUED | OUTPATIENT
Start: 2022-07-28 | End: 2022-07-28 | Stop reason: HOSPADM

## 2022-07-28 RX ORDER — CYCLOBENZAPRINE HCL 10 MG
10 TABLET ORAL 3 TIMES DAILY PRN
Status: DISCONTINUED | OUTPATIENT
Start: 2022-07-28 | End: 2022-07-31 | Stop reason: HOSPADM

## 2022-07-28 RX ORDER — HYDROMORPHONE HYDROCHLORIDE 1 MG/ML
0.5 INJECTION, SOLUTION INTRAMUSCULAR; INTRAVENOUS; SUBCUTANEOUS
Status: DISCONTINUED | OUTPATIENT
Start: 2022-07-28 | End: 2022-07-31 | Stop reason: HOSPADM

## 2022-07-28 RX ORDER — NALOXONE HCL 0.4 MG/ML
0.2 VIAL (ML) INJECTION AS NEEDED
Status: DISCONTINUED | OUTPATIENT
Start: 2022-07-28 | End: 2022-07-28 | Stop reason: HOSPADM

## 2022-07-28 RX ORDER — HYDROMORPHONE HYDROCHLORIDE 1 MG/ML
0.5 INJECTION, SOLUTION INTRAMUSCULAR; INTRAVENOUS; SUBCUTANEOUS
Status: DISCONTINUED | OUTPATIENT
Start: 2022-07-28 | End: 2022-07-28 | Stop reason: HOSPADM

## 2022-07-28 RX ORDER — NALOXONE HCL 0.4 MG/ML
0.4 VIAL (ML) INJECTION
Status: DISCONTINUED | OUTPATIENT
Start: 2022-07-28 | End: 2022-07-31 | Stop reason: HOSPADM

## 2022-07-28 RX ORDER — INSULIN LISPRO 100 [IU]/ML
0-9 INJECTION, SOLUTION INTRAVENOUS; SUBCUTANEOUS
Status: DISCONTINUED | OUTPATIENT
Start: 2022-07-29 | End: 2022-07-31 | Stop reason: HOSPADM

## 2022-07-28 RX ORDER — CEFAZOLIN SODIUM 2 G/100ML
2 INJECTION, SOLUTION INTRAVENOUS EVERY 8 HOURS
Status: COMPLETED | OUTPATIENT
Start: 2022-07-28 | End: 2022-07-29

## 2022-07-28 RX ORDER — MAGNESIUM HYDROXIDE 1200 MG/15ML
LIQUID ORAL AS NEEDED
Status: DISCONTINUED | OUTPATIENT
Start: 2022-07-28 | End: 2022-07-28 | Stop reason: HOSPADM

## 2022-07-28 RX ORDER — ONDANSETRON 2 MG/ML
INJECTION INTRAMUSCULAR; INTRAVENOUS AS NEEDED
Status: DISCONTINUED | OUTPATIENT
Start: 2022-07-28 | End: 2022-07-28 | Stop reason: SURG

## 2022-07-28 RX ORDER — PROMETHAZINE HYDROCHLORIDE 25 MG/1
25 SUPPOSITORY RECTAL ONCE AS NEEDED
Status: DISCONTINUED | OUTPATIENT
Start: 2022-07-28 | End: 2022-07-28 | Stop reason: HOSPADM

## 2022-07-28 RX ORDER — AMOXICILLIN 250 MG
1 CAPSULE ORAL NIGHTLY PRN
Status: DISCONTINUED | OUTPATIENT
Start: 2022-07-28 | End: 2022-07-31 | Stop reason: HOSPADM

## 2022-07-28 RX ORDER — NEOSTIGMINE METHYLSULFATE 0.5 MG/ML
INJECTION, SOLUTION INTRAVENOUS AS NEEDED
Status: DISCONTINUED | OUTPATIENT
Start: 2022-07-28 | End: 2022-07-28 | Stop reason: SURG

## 2022-07-28 RX ORDER — SODIUM CHLORIDE 0.9 % (FLUSH) 0.9 %
10 SYRINGE (ML) INJECTION AS NEEDED
Status: DISCONTINUED | OUTPATIENT
Start: 2022-07-28 | End: 2022-07-31 | Stop reason: HOSPADM

## 2022-07-28 RX ADMIN — NEOSTIGMINE METHYLSULFATE 3.5 MG: 0.5 INJECTION INTRAVENOUS at 17:23

## 2022-07-28 RX ADMIN — SODIUM CHLORIDE, POTASSIUM CHLORIDE, SODIUM LACTATE AND CALCIUM CHLORIDE 9 ML/HR: 600; 310; 30; 20 INJECTION, SOLUTION INTRAVENOUS at 14:09

## 2022-07-28 RX ADMIN — Medication 400 MCG: at 21:11

## 2022-07-28 RX ADMIN — CEFAZOLIN SODIUM 2 G: 2 INJECTION, SOLUTION INTRAVENOUS at 14:41

## 2022-07-28 RX ADMIN — METFORMIN HYDROCHLORIDE 1000 MG: 1000 TABLET, FILM COATED ORAL at 21:11

## 2022-07-28 RX ADMIN — PROPOFOL 150 MG: 10 INJECTION, EMULSION INTRAVENOUS at 14:53

## 2022-07-28 RX ADMIN — KETAMINE HYDROCHLORIDE 10 MG: 10 INJECTION INTRAMUSCULAR; INTRAVENOUS at 15:52

## 2022-07-28 RX ADMIN — FAMOTIDINE 20 MG: 10 INJECTION INTRAVENOUS at 14:08

## 2022-07-28 RX ADMIN — GLYCOPYRROLATE 0.5 MG: 0.2 INJECTION INTRAMUSCULAR; INTRAVENOUS at 17:23

## 2022-07-28 RX ADMIN — FENTANYL CITRATE 50 MCG: 50 INJECTION INTRAMUSCULAR; INTRAVENOUS at 16:06

## 2022-07-28 RX ADMIN — ROCURONIUM BROMIDE 50 MG: 50 INJECTION INTRAVENOUS at 14:53

## 2022-07-28 RX ADMIN — DEXAMETHASONE SODIUM PHOSPHATE 8 MG: 10 INJECTION INTRAMUSCULAR; INTRAVENOUS at 15:11

## 2022-07-28 RX ADMIN — KETAMINE HYDROCHLORIDE 30 MG: 10 INJECTION INTRAMUSCULAR; INTRAVENOUS at 14:53

## 2022-07-28 RX ADMIN — LIDOCAINE HYDROCHLORIDE 80 MG: 20 INJECTION, SOLUTION INFILTRATION; PERINEURAL at 17:31

## 2022-07-28 RX ADMIN — PROPOFOL 50 MG: 10 INJECTION, EMULSION INTRAVENOUS at 17:31

## 2022-07-28 RX ADMIN — MIDAZOLAM 0.5 MG: 1 INJECTION INTRAMUSCULAR; INTRAVENOUS at 14:08

## 2022-07-28 RX ADMIN — LIDOCAINE HYDROCHLORIDE 80 MG: 20 INJECTION, SOLUTION INFILTRATION; PERINEURAL at 14:53

## 2022-07-28 RX ADMIN — EPHEDRINE SULFATE 10 MG: 50 INJECTION INTRAVENOUS at 15:22

## 2022-07-28 RX ADMIN — EPHEDRINE SULFATE 20 MG: 50 INJECTION INTRAVENOUS at 15:48

## 2022-07-28 RX ADMIN — SODIUM CHLORIDE, POTASSIUM CHLORIDE, SODIUM LACTATE AND CALCIUM CHLORIDE: 600; 310; 30; 20 INJECTION, SOLUTION INTRAVENOUS at 16:36

## 2022-07-28 RX ADMIN — HYDROCODONE BITARTRATE AND ACETAMINOPHEN 1 TABLET: 7.5; 325 TABLET ORAL at 21:11

## 2022-07-28 RX ADMIN — CEFAZOLIN 2 G: 10 INJECTION, POWDER, FOR SOLUTION INTRAVENOUS at 22:25

## 2022-07-28 RX ADMIN — ONDANSETRON 4 MG: 2 INJECTION INTRAMUSCULAR; INTRAVENOUS at 16:48

## 2022-07-28 RX ADMIN — FENTANYL CITRATE 50 MCG: 50 INJECTION INTRAMUSCULAR; INTRAVENOUS at 14:08

## 2022-07-28 RX ADMIN — ATORVASTATIN CALCIUM 20 MG: 20 TABLET, FILM COATED ORAL at 21:11

## 2022-07-28 RX ADMIN — GABAPENTIN 900 MG: 300 CAPSULE ORAL at 21:11

## 2022-07-28 RX ADMIN — FENTANYL CITRATE 50 MCG: 50 INJECTION INTRAMUSCULAR; INTRAVENOUS at 14:53

## 2022-07-28 RX ADMIN — KETAMINE HYDROCHLORIDE 10 MG: 10 INJECTION INTRAMUSCULAR; INTRAVENOUS at 16:49

## 2022-07-28 NOTE — ANESTHESIA PREPROCEDURE EVALUATION
Anesthesia Evaluation     Patient summary reviewed and Nursing notes reviewed                Airway   Mallampati: II  TM distance: >3 FB  Neck ROM: limited  No difficulty expected  Dental      Pulmonary    (+) a smoker Former, COPD, sleep apnea on CPAP,   Cardiovascular     ECG reviewed  Rhythm: regular  Rate: normal    (+) hyperlipidemia,       Neuro/Psych- negative ROS  GI/Hepatic/Renal/Endo    (+) obesity,   diabetes mellitus type 2, thyroid problem hypothyroidism    Musculoskeletal     (+) back pain,   Abdominal    Substance History - negative use     OB/GYN negative ob/gyn ROS         Other   arthritis,                      Anesthesia Plan    ASA 3     general     (LISANDRA /CPAP    I have reviewed the patient's history with the patient and the chart, including all pertinent laboratory results and imaging. I have explained the risks of anesthesia including but not limited to dental damage, corneal abrasion, nerve injury, MI, stroke, and death. Questions asked and answered. Anesthetic plan discussed with patient and team as indicated. Patient expressed understanding of the above.  )  intravenous induction     Anesthetic plan, risks, benefits, and alternatives have been provided, discussed and informed consent has been obtained with: patient.        CODE STATUS:

## 2022-07-28 NOTE — ANESTHESIA PROCEDURE NOTES
Airway  Urgency: elective    Date/Time: 7/28/2022 2:55 PM  Airway not difficult    General Information and Staff    Patient location during procedure: OR  CRNA/CAA: Irlanda Pires CRNA    Indications and Patient Condition  Indications for airway management: airway protection    Preoxygenated: yes  Mask difficulty assessment: 1 - vent by mask    Final Airway Details  Final airway type: endotracheal airway      Successful airway: ETT  Cuffed: yes   Successful intubation technique: direct laryngoscopy  Endotracheal tube insertion site: oral  Blade: Malika  Blade size: 4  ETT size (mm): 7.5  Cormack-Lehane Classification: grade I - full view of glottis  Placement verified by: chest auscultation and capnometry   Measured from: lips  ETT/EBT  to lips (cm): 22  Number of attempts at approach: 1  Assessment: lips, teeth, and gum same as pre-op and atraumatic intubation    Additional Comments   ett cuff up at MOP

## 2022-07-29 LAB
ANION GAP SERPL CALCULATED.3IONS-SCNC: 10.7 MMOL/L (ref 5–15)
BASOPHILS # BLD AUTO: 0.02 10*3/MM3 (ref 0–0.2)
BASOPHILS NFR BLD AUTO: 0.2 % (ref 0–1.5)
BUN SERPL-MCNC: 12 MG/DL (ref 8–23)
BUN/CREAT SERPL: 15.4 (ref 7–25)
CALCIUM SPEC-SCNC: 9.3 MG/DL (ref 8.6–10.5)
CHLORIDE SERPL-SCNC: 104 MMOL/L (ref 98–107)
CO2 SERPL-SCNC: 24.3 MMOL/L (ref 22–29)
CREAT SERPL-MCNC: 0.78 MG/DL (ref 0.76–1.27)
DEPRECATED RDW RBC AUTO: 43.4 FL (ref 37–54)
EGFRCR SERPLBLD CKD-EPI 2021: 93.6 ML/MIN/1.73
EOSINOPHIL # BLD AUTO: 0 10*3/MM3 (ref 0–0.4)
EOSINOPHIL NFR BLD AUTO: 0 % (ref 0.3–6.2)
ERYTHROCYTE [DISTWIDTH] IN BLOOD BY AUTOMATED COUNT: 12.8 % (ref 12.3–15.4)
GLUCOSE BLDC GLUCOMTR-MCNC: 126 MG/DL (ref 70–130)
GLUCOSE BLDC GLUCOMTR-MCNC: 141 MG/DL (ref 70–130)
GLUCOSE BLDC GLUCOMTR-MCNC: 146 MG/DL (ref 70–130)
GLUCOSE BLDC GLUCOMTR-MCNC: 155 MG/DL (ref 70–130)
GLUCOSE SERPL-MCNC: 174 MG/DL (ref 65–99)
HCT VFR BLD AUTO: 39.6 % (ref 37.5–51)
HGB BLD-MCNC: 13.2 G/DL (ref 13–17.7)
IMM GRANULOCYTES # BLD AUTO: 0.03 10*3/MM3 (ref 0–0.05)
IMM GRANULOCYTES NFR BLD AUTO: 0.3 % (ref 0–0.5)
LYMPHOCYTES # BLD AUTO: 0.97 10*3/MM3 (ref 0.7–3.1)
LYMPHOCYTES NFR BLD AUTO: 9.8 % (ref 19.6–45.3)
MCH RBC QN AUTO: 31.4 PG (ref 26.6–33)
MCHC RBC AUTO-ENTMCNC: 33.3 G/DL (ref 31.5–35.7)
MCV RBC AUTO: 94.1 FL (ref 79–97)
MONOCYTES # BLD AUTO: 0.52 10*3/MM3 (ref 0.1–0.9)
MONOCYTES NFR BLD AUTO: 5.2 % (ref 5–12)
NEUTROPHILS NFR BLD AUTO: 8.37 10*3/MM3 (ref 1.7–7)
NEUTROPHILS NFR BLD AUTO: 84.5 % (ref 42.7–76)
NRBC BLD AUTO-RTO: 0 /100 WBC (ref 0–0.2)
PLATELET # BLD AUTO: 223 10*3/MM3 (ref 140–450)
PMV BLD AUTO: 9.9 FL (ref 6–12)
POTASSIUM SERPL-SCNC: 5 MMOL/L (ref 3.5–5.2)
RBC # BLD AUTO: 4.21 10*6/MM3 (ref 4.14–5.8)
SODIUM SERPL-SCNC: 139 MMOL/L (ref 136–145)
WBC NRBC COR # BLD: 9.91 10*3/MM3 (ref 3.4–10.8)

## 2022-07-29 PROCEDURE — 63710000001 INSULIN LISPRO (HUMAN) PER 5 UNITS: Performed by: INTERNAL MEDICINE

## 2022-07-29 PROCEDURE — 82962 GLUCOSE BLOOD TEST: CPT

## 2022-07-29 PROCEDURE — 80048 BASIC METABOLIC PNL TOTAL CA: CPT | Performed by: NEUROLOGICAL SURGERY

## 2022-07-29 PROCEDURE — 85025 COMPLETE CBC W/AUTO DIFF WBC: CPT | Performed by: NEUROLOGICAL SURGERY

## 2022-07-29 PROCEDURE — 99024 POSTOP FOLLOW-UP VISIT: CPT

## 2022-07-29 PROCEDURE — 25010000002 CEFAZOLIN PER 500 MG: Performed by: NEUROLOGICAL SURGERY

## 2022-07-29 RX ORDER — METHOCARBAMOL 100 MG/ML
500 INJECTION, SOLUTION INTRAMUSCULAR; INTRAVENOUS ONCE
Status: DISCONTINUED | OUTPATIENT
Start: 2022-07-29 | End: 2022-07-31 | Stop reason: HOSPADM

## 2022-07-29 RX ADMIN — INSULIN GLARGINE-YFGN 25 UNITS: 100 INJECTION, SOLUTION SUBCUTANEOUS at 08:10

## 2022-07-29 RX ADMIN — METFORMIN HYDROCHLORIDE 1000 MG: 1000 TABLET, FILM COATED ORAL at 18:17

## 2022-07-29 RX ADMIN — GABAPENTIN 900 MG: 300 CAPSULE ORAL at 20:15

## 2022-07-29 RX ADMIN — SODIUM CHLORIDE, POTASSIUM CHLORIDE, SODIUM LACTATE AND CALCIUM CHLORIDE 100 ML/HR: 600; 310; 30; 20 INJECTION, SOLUTION INTRAVENOUS at 11:31

## 2022-07-29 RX ADMIN — METFORMIN HYDROCHLORIDE 1000 MG: 1000 TABLET, FILM COATED ORAL at 08:08

## 2022-07-29 RX ADMIN — INSULIN LISPRO 2 UNITS: 100 INJECTION, SOLUTION INTRAVENOUS; SUBCUTANEOUS at 11:42

## 2022-07-29 RX ADMIN — CEFAZOLIN 2 G: 10 INJECTION, POWDER, FOR SOLUTION INTRAVENOUS at 13:34

## 2022-07-29 RX ADMIN — LEVOTHYROXINE SODIUM 50 MCG: 0.05 TABLET ORAL at 08:08

## 2022-07-29 RX ADMIN — CEFAZOLIN 2 G: 10 INJECTION, POWDER, FOR SOLUTION INTRAVENOUS at 06:31

## 2022-07-29 RX ADMIN — GABAPENTIN 900 MG: 300 CAPSULE ORAL at 08:08

## 2022-07-29 RX ADMIN — Medication 400 MCG: at 08:09

## 2022-07-29 RX ADMIN — Medication 400 MCG: at 20:15

## 2022-07-29 RX ADMIN — ATORVASTATIN CALCIUM 20 MG: 20 TABLET, FILM COATED ORAL at 08:08

## 2022-07-29 NOTE — ANESTHESIA POSTPROCEDURE EVALUATION
Patient: Andrea Fam    Procedure Summary     Date: 07/28/22 Room / Location: [facility] OR [address] [place] [address] MAIN OR    Anesthesia Start: 1446 Anesthesia Stop: 1749    Procedure: Open right lumbar four/ five, right lumbar five/sacral one decompression (Right Spine Lumbar) Diagnosis:       DDD (degenerative disc disease), lumbar      Spinal stenosis of lumbar region with neurogenic claudication      History of lumbar surgery      (DDD (degenerative disc disease), lumbar [M51.36])      (Spinal stenosis of lumbar region with neurogenic claudication [M48.062])      (History of lumbar surgery [Z98.890])    Surgeons: Isidoro Donaldson MD Provider: Gaurav Diaz MD    Anesthesia Type: general ASA Status: 3          Anesthesia Type: general    Vitals  Vitals Value Taken Time   /70 07/28/22 1816   Temp 36.8 °C (98.3 °F) 07/28/22 1745   Pulse 71 07/28/22 1826   Resp 10 07/28/22 1745   SpO2 95 % 07/28/22 1826   Vitals shown include unvalidated device data.        Anesthesia Post Evaluation

## 2022-07-30 LAB
ANION GAP SERPL CALCULATED.3IONS-SCNC: 8.9 MMOL/L (ref 5–15)
BASOPHILS # BLD AUTO: 0.05 10*3/MM3 (ref 0–0.2)
BASOPHILS NFR BLD AUTO: 0.6 % (ref 0–1.5)
BUN SERPL-MCNC: 12 MG/DL (ref 8–23)
BUN/CREAT SERPL: 16.7 (ref 7–25)
CALCIUM SPEC-SCNC: 8.8 MG/DL (ref 8.6–10.5)
CHLORIDE SERPL-SCNC: 102 MMOL/L (ref 98–107)
CO2 SERPL-SCNC: 30.1 MMOL/L (ref 22–29)
CREAT SERPL-MCNC: 0.72 MG/DL (ref 0.76–1.27)
DEPRECATED RDW RBC AUTO: 43.3 FL (ref 37–54)
EGFRCR SERPLBLD CKD-EPI 2021: 95.9 ML/MIN/1.73
EOSINOPHIL # BLD AUTO: 0.19 10*3/MM3 (ref 0–0.4)
EOSINOPHIL NFR BLD AUTO: 2.1 % (ref 0.3–6.2)
ERYTHROCYTE [DISTWIDTH] IN BLOOD BY AUTOMATED COUNT: 12.7 % (ref 12.3–15.4)
GLUCOSE BLDC GLUCOMTR-MCNC: 106 MG/DL (ref 70–130)
GLUCOSE BLDC GLUCOMTR-MCNC: 114 MG/DL (ref 70–130)
GLUCOSE BLDC GLUCOMTR-MCNC: 128 MG/DL (ref 70–130)
GLUCOSE BLDC GLUCOMTR-MCNC: 163 MG/DL (ref 70–130)
GLUCOSE SERPL-MCNC: 140 MG/DL (ref 65–99)
HCT VFR BLD AUTO: 38.8 % (ref 37.5–51)
HGB BLD-MCNC: 13.1 G/DL (ref 13–17.7)
IMM GRANULOCYTES # BLD AUTO: 0.02 10*3/MM3 (ref 0–0.05)
IMM GRANULOCYTES NFR BLD AUTO: 0.2 % (ref 0–0.5)
LYMPHOCYTES # BLD AUTO: 1.64 10*3/MM3 (ref 0.7–3.1)
LYMPHOCYTES NFR BLD AUTO: 18.2 % (ref 19.6–45.3)
MCH RBC QN AUTO: 31.7 PG (ref 26.6–33)
MCHC RBC AUTO-ENTMCNC: 33.8 G/DL (ref 31.5–35.7)
MCV RBC AUTO: 93.9 FL (ref 79–97)
MONOCYTES # BLD AUTO: 0.83 10*3/MM3 (ref 0.1–0.9)
MONOCYTES NFR BLD AUTO: 9.2 % (ref 5–12)
NEUTROPHILS NFR BLD AUTO: 6.3 10*3/MM3 (ref 1.7–7)
NEUTROPHILS NFR BLD AUTO: 69.7 % (ref 42.7–76)
NRBC BLD AUTO-RTO: 0 /100 WBC (ref 0–0.2)
PLATELET # BLD AUTO: 194 10*3/MM3 (ref 140–450)
PMV BLD AUTO: 9.8 FL (ref 6–12)
POTASSIUM SERPL-SCNC: 4.2 MMOL/L (ref 3.5–5.2)
RBC # BLD AUTO: 4.13 10*6/MM3 (ref 4.14–5.8)
SODIUM SERPL-SCNC: 141 MMOL/L (ref 136–145)
WBC NRBC COR # BLD: 9.03 10*3/MM3 (ref 3.4–10.8)

## 2022-07-30 PROCEDURE — 97161 PT EVAL LOW COMPLEX 20 MIN: CPT

## 2022-07-30 PROCEDURE — 80048 BASIC METABOLIC PNL TOTAL CA: CPT | Performed by: HOSPITALIST

## 2022-07-30 PROCEDURE — 85025 COMPLETE CBC W/AUTO DIFF WBC: CPT | Performed by: HOSPITALIST

## 2022-07-30 PROCEDURE — 97530 THERAPEUTIC ACTIVITIES: CPT

## 2022-07-30 PROCEDURE — 99024 POSTOP FOLLOW-UP VISIT: CPT | Performed by: NEUROLOGICAL SURGERY

## 2022-07-30 PROCEDURE — 82962 GLUCOSE BLOOD TEST: CPT

## 2022-07-30 RX ADMIN — EMPAGLIFLOZIN 25 MG: 25 TABLET, FILM COATED ORAL at 08:50

## 2022-07-30 RX ADMIN — METFORMIN HYDROCHLORIDE 1000 MG: 1000 TABLET, FILM COATED ORAL at 18:19

## 2022-07-30 RX ADMIN — ATORVASTATIN CALCIUM 20 MG: 20 TABLET, FILM COATED ORAL at 08:50

## 2022-07-30 RX ADMIN — GABAPENTIN 900 MG: 300 CAPSULE ORAL at 20:00

## 2022-07-30 RX ADMIN — GABAPENTIN 900 MG: 300 CAPSULE ORAL at 08:50

## 2022-07-30 RX ADMIN — LEVOTHYROXINE SODIUM 50 MCG: 0.05 TABLET ORAL at 08:50

## 2022-07-30 RX ADMIN — Medication 400 MCG: at 20:00

## 2022-07-30 RX ADMIN — INSULIN GLARGINE-YFGN 25 UNITS: 100 INJECTION, SOLUTION SUBCUTANEOUS at 08:49

## 2022-07-30 RX ADMIN — Medication 10 ML: at 08:52

## 2022-07-30 RX ADMIN — Medication 400 MCG: at 08:50

## 2022-07-30 RX ADMIN — METFORMIN HYDROCHLORIDE 1000 MG: 1000 TABLET, FILM COATED ORAL at 08:50

## 2022-07-31 ENCOUNTER — READMISSION MANAGEMENT (OUTPATIENT)
Dept: CALL CENTER | Facility: HOSPITAL | Age: 72
End: 2022-07-31

## 2022-07-31 ENCOUNTER — HOME HEALTH ADMISSION (OUTPATIENT)
Dept: HOME HEALTH SERVICES | Facility: HOME HEALTHCARE | Age: 72
End: 2022-07-31

## 2022-07-31 VITALS
HEART RATE: 77 BPM | HEIGHT: 70 IN | BODY MASS INDEX: 28.2 KG/M2 | RESPIRATION RATE: 16 BRPM | WEIGHT: 197 LBS | TEMPERATURE: 98 F | DIASTOLIC BLOOD PRESSURE: 74 MMHG | OXYGEN SATURATION: 93 % | SYSTOLIC BLOOD PRESSURE: 109 MMHG

## 2022-07-31 LAB
ANION GAP SERPL CALCULATED.3IONS-SCNC: 11 MMOL/L (ref 5–15)
BASOPHILS # BLD AUTO: 0.02 10*3/MM3 (ref 0–0.2)
BASOPHILS NFR BLD AUTO: 0.2 % (ref 0–1.5)
BUN SERPL-MCNC: 14 MG/DL (ref 8–23)
BUN/CREAT SERPL: 18.2 (ref 7–25)
CALCIUM SPEC-SCNC: 9.3 MG/DL (ref 8.6–10.5)
CHLORIDE SERPL-SCNC: 100 MMOL/L (ref 98–107)
CO2 SERPL-SCNC: 26 MMOL/L (ref 22–29)
CREAT SERPL-MCNC: 0.77 MG/DL (ref 0.76–1.27)
DEPRECATED RDW RBC AUTO: 42.5 FL (ref 37–54)
EGFRCR SERPLBLD CKD-EPI 2021: 93.9 ML/MIN/1.73
EOSINOPHIL # BLD AUTO: 0.16 10*3/MM3 (ref 0–0.4)
EOSINOPHIL NFR BLD AUTO: 1.8 % (ref 0.3–6.2)
ERYTHROCYTE [DISTWIDTH] IN BLOOD BY AUTOMATED COUNT: 12.6 % (ref 12.3–15.4)
GLUCOSE BLDC GLUCOMTR-MCNC: 100 MG/DL (ref 70–130)
GLUCOSE SERPL-MCNC: 87 MG/DL (ref 65–99)
HCT VFR BLD AUTO: 41.1 % (ref 37.5–51)
HGB BLD-MCNC: 14 G/DL (ref 13–17.7)
IMM GRANULOCYTES # BLD AUTO: 0.04 10*3/MM3 (ref 0–0.05)
IMM GRANULOCYTES NFR BLD AUTO: 0.5 % (ref 0–0.5)
LYMPHOCYTES # BLD AUTO: 1.78 10*3/MM3 (ref 0.7–3.1)
LYMPHOCYTES NFR BLD AUTO: 20.6 % (ref 19.6–45.3)
MCH RBC QN AUTO: 31.4 PG (ref 26.6–33)
MCHC RBC AUTO-ENTMCNC: 34.1 G/DL (ref 31.5–35.7)
MCV RBC AUTO: 92.2 FL (ref 79–97)
MONOCYTES # BLD AUTO: 0.96 10*3/MM3 (ref 0.1–0.9)
MONOCYTES NFR BLD AUTO: 11.1 % (ref 5–12)
NEUTROPHILS NFR BLD AUTO: 5.69 10*3/MM3 (ref 1.7–7)
NEUTROPHILS NFR BLD AUTO: 65.8 % (ref 42.7–76)
NRBC BLD AUTO-RTO: 0 /100 WBC (ref 0–0.2)
PLATELET # BLD AUTO: 202 10*3/MM3 (ref 140–450)
PMV BLD AUTO: 10 FL (ref 6–12)
POTASSIUM SERPL-SCNC: 4.1 MMOL/L (ref 3.5–5.2)
RBC # BLD AUTO: 4.46 10*6/MM3 (ref 4.14–5.8)
SODIUM SERPL-SCNC: 137 MMOL/L (ref 136–145)
WBC NRBC COR # BLD: 8.65 10*3/MM3 (ref 3.4–10.8)

## 2022-07-31 PROCEDURE — 80048 BASIC METABOLIC PNL TOTAL CA: CPT | Performed by: INTERNAL MEDICINE

## 2022-07-31 PROCEDURE — 82962 GLUCOSE BLOOD TEST: CPT

## 2022-07-31 PROCEDURE — 99024 POSTOP FOLLOW-UP VISIT: CPT

## 2022-07-31 PROCEDURE — 85025 COMPLETE CBC W/AUTO DIFF WBC: CPT | Performed by: INTERNAL MEDICINE

## 2022-07-31 RX ORDER — ACETAMINOPHEN 325 MG/1
650 TABLET ORAL EVERY 4 HOURS PRN
COMMUNITY
Start: 2022-07-31 | End: 2022-08-09

## 2022-07-31 RX ORDER — CYCLOBENZAPRINE HCL 10 MG
10 TABLET ORAL 3 TIMES DAILY PRN
Qty: 40 TABLET | Refills: 1 | Status: CANCELLED | OUTPATIENT
Start: 2022-07-31

## 2022-07-31 RX ORDER — PSEUDOEPHEDRINE HCL 30 MG
100 TABLET ORAL 2 TIMES DAILY PRN
Qty: 20 CAPSULE | Refills: 0 | COMMUNITY
Start: 2022-07-31 | End: 2022-08-09

## 2022-07-31 RX ORDER — CYCLOBENZAPRINE HCL 10 MG
10 TABLET ORAL 3 TIMES DAILY PRN
Qty: 40 TABLET | Refills: 1 | Status: SHIPPED | OUTPATIENT
Start: 2022-07-31 | End: 2022-08-09

## 2022-07-31 RX ORDER — HYDROCODONE BITARTRATE AND ACETAMINOPHEN 7.5; 325 MG/1; MG/1
1 TABLET ORAL EVERY 4 HOURS PRN
Qty: 40 TABLET | Refills: 0 | Status: SHIPPED | OUTPATIENT
Start: 2022-07-31 | End: 2022-08-07

## 2022-07-31 RX ADMIN — ATORVASTATIN CALCIUM 20 MG: 20 TABLET, FILM COATED ORAL at 08:19

## 2022-07-31 RX ADMIN — INSULIN GLARGINE-YFGN 25 UNITS: 100 INJECTION, SOLUTION SUBCUTANEOUS at 08:19

## 2022-07-31 RX ADMIN — GABAPENTIN 900 MG: 300 CAPSULE ORAL at 08:18

## 2022-07-31 RX ADMIN — LEVOTHYROXINE SODIUM 50 MCG: 0.05 TABLET ORAL at 08:19

## 2022-07-31 RX ADMIN — Medication 10 ML: at 08:19

## 2022-07-31 RX ADMIN — METFORMIN HYDROCHLORIDE 1000 MG: 1000 TABLET, FILM COATED ORAL at 08:19

## 2022-07-31 RX ADMIN — Medication 400 MCG: at 08:19

## 2022-07-31 RX ADMIN — EMPAGLIFLOZIN 25 MG: 25 TABLET, FILM COATED ORAL at 08:19

## 2022-07-31 NOTE — OUTREACH NOTE
Prep Survey    Flowsheet Row Responses   [facility] patient discharged from? [place]   Is LACE score < 7 ? No   Emergency Room discharge w/ pulse ox? No   Eligibility [facility]   Date of Admission 07/28/22   Date of Discharge 07/31/22   Discharge Disposition Home or Self Care   Discharge diagnosis DDD (degenerative disc disease), lumbar   Does the patient have one of the following disease processes/diagnoses(primary or secondary)? Other   Does the patient have Home health ordered? Yes   What is the Home health agency?  [facility] .   Is there a DME ordered? No   Prep survey completed? Yes          JESÚS THOMSON - Registered Nurse

## 2022-08-01 ENCOUNTER — TELEPHONE (OUTPATIENT)
Dept: NEUROSURGERY | Facility: CLINIC | Age: 72
End: 2022-08-01

## 2022-08-01 ENCOUNTER — HOME CARE VISIT (OUTPATIENT)
Dept: HOME HEALTH SERVICES | Facility: HOME HEALTHCARE | Age: 72
End: 2022-08-01

## 2022-08-01 ENCOUNTER — TRANSITIONAL CARE MANAGEMENT TELEPHONE ENCOUNTER (OUTPATIENT)
Dept: CALL CENTER | Facility: HOSPITAL | Age: 72
End: 2022-08-01

## 2022-08-01 VITALS — RESPIRATION RATE: 17 BRPM | HEART RATE: 82 BPM

## 2022-08-01 PROCEDURE — G0151 HHCP-SERV OF PT,EA 15 MIN: HCPCS

## 2022-08-01 NOTE — CASE MANAGEMENT/SOCIAL WORK
Case Management Discharge Note      Final Note: Home with [facility].    Provided Post Acute Provider List?: Yes  Post Acute Provider List: Home Health    Selected Continued Care - Discharged on 7/31/2022 Admission date: 7/28/2022 - Discharge disposition: Home or Self Care    Destination    No services have been selected for the patient.              Durable Medical Equipment    No services have been selected for the patient.              Dialysis/Infusion    No services have been selected for the patient.              Home Medical Care Coordination complete.    Service Provider Selected Services Address Phone Fax Patient Preferred    [facility]  Home Health Services [address] [address] [phone] --          Therapy    No services have been selected for the patient.              Community Resources    No services have been selected for the patient.              Community & DME    No services have been selected for the patient.                       Final Discharge Disposition Code: 06 - home with home health care

## 2022-08-01 NOTE — HOME HEALTH
"REASON FOR REFERRAL: 72  year old (male) presents with c/o pain, decreased I with ADLS, transfers, and ambulation following recent spinal surgery  (hospitalized 7/28-7/31)    DIAGNOSIS:   aftercare L4-5; S1 decompression surgery - Dr. Donaldson  7/28/22    SURGICAL PROCEDURE:     Open right lumbar four/ five, right lumbar five/sacral one decompression with  - Complications: Small CSF durotomy, primarily repaired    PERTINENT MEDICAL HISTORY:   Rotator cuff insufficiency of right shoulder   Type 2 diabetes mellitus  Peripheral nerve disease   Sacral pain   LISANDRA (obstructive sleep apnea) on auto CPAP   Weak urinary stream   Mixed hyperlipidemia   Hypothyroidism   Lumbar radiculopathy   DDD (degenerative disc disease), lumbar   Spinal stenosis of lumbar region with neurogenic claudication       PRIOR LEVEL OF FUNCTION:  I all mobility and self care     SUBJECTIVE: patient c/o cervical pain more than lumbar pain.  feels like it \"happened during surgery\".  Eager to resume job as [employer]     DATE OF NEXT APPOINTMENT WITH DOCTOR:  post-op visit with Dr. Donaldson 8/11 8:45    PLAN FOR NEXT VISIT:   MEDICAL NECESSITY FOR ONGOING SKILLED THERAPY:  physical therapy medically necessary for treatment of:  pain, decreased I with transfers, ambulation, ADLS following recent L4-S1 decompression surgery (right side).  Requires instruction in appropriate progression of exercises; education in fall prevention/pain/edema management; gait training to reduce reliance on caregiver; transfert training; instruction in spinal precautions; balance retraining to prevent falls.  Without skilled physical therapy, patient at risk for: chronic pain, rehospitalization; increased reliance on caregivers    SPECIFIC INTERVENTIONS AND GOALS TO ADDRESS ON NEXT VISIT:  - progress HEP  - pain/edema management  - gait training to restore normal mechanics    FREQUENCY AND DURATION:  2 week 2    ANY OTHER FOLLOW UP NEEDED: none    REASSESSMENT DUE " DATE: 30 day 8/31/22

## 2022-08-01 NOTE — OUTREACH NOTE
Call Center TCM Note    Flowsheet Row Responses   [facility] patient discharged from? [place]   Does the patient have one of the following disease processes/diagnoses(primary or secondary)? Other   TCM attempt successful? Yes   Call start time 1637   Call end time 1642   Discharge diagnosis DDD (degenerative disc disease), lumbar   Meds reviewed with patient/caregiver? Yes   Is the patient having any side effects they believe may be caused by any medication additions or changes? No   Does the patient have all medications ordered at discharge? Yes   Is the patient taking all medications as directed (includes completed medication regime)? Yes   Comments regarding appointments Surgical f/u on 8/11/22   Does the patient have a primary care provider?  Yes   Does the patient have an appointment with their PCP within 7 days of discharge? Greater than 7 days   Comments regarding PCP Hospital PCP FOLLOW UP APPOINTMENT IS 8/9/22@200pm   Nursing Interventions --  [assisted with scheduling]   Has the patient kept scheduled appointments due by today? Yes   What is the Home health agency?  [facility].   Has home health visited the patient within 72 hours of discharge? Yes   Psychosocial issues? No   Did the patient receive a copy of their discharge instructions? Yes   Nursing interventions Reviewed instructions with patient   What is the patient's perception of their health status since discharge? Improving  [Doing well post-op--denies pain, has had a BM. Ambulatory with walker, PT has started.  Incisions without issues.  Routine post op care reviewed, encouraged IS use and monitor for respiratory s/s.]   Is the patient/caregiver able to teach back signs and symptoms related to disease process for when to call PCP? Yes   Is the patient/caregiver able to teach back signs and symptoms related to disease process for when to call 911? Yes   TCM call completed? Yes          Anabela Valadez RN    8/1/2022, 16:45 EDT

## 2022-08-01 NOTE — TELEPHONE ENCOUNTER
----- Message from Clemencia Garcia PA-C sent at 7/31/2022 10:09 AM EDT -----  Schedule this patient for a 2-week postop appointment.  Thank you.

## 2022-08-04 ENCOUNTER — HOME CARE VISIT (OUTPATIENT)
Dept: HOME HEALTH SERVICES | Facility: HOME HEALTHCARE | Age: 72
End: 2022-08-04

## 2022-08-04 PROCEDURE — G0151 HHCP-SERV OF PT,EA 15 MIN: HCPCS

## 2022-08-05 VITALS
OXYGEN SATURATION: 95 % | TEMPERATURE: 97.8 F | DIASTOLIC BLOOD PRESSURE: 70 MMHG | RESPIRATION RATE: 18 BRPM | HEART RATE: 63 BPM | SYSTOLIC BLOOD PRESSURE: 122 MMHG

## 2022-08-06 ENCOUNTER — HOME CARE VISIT (OUTPATIENT)
Dept: HOME HEALTH SERVICES | Facility: HOME HEALTHCARE | Age: 72
End: 2022-08-06

## 2022-08-06 NOTE — HOME HEALTH
"SUBJECTIVE: \"my neck is feeling better.  I don't think I need the walker as much.  Barbie been trying to walk on my own some\"    DATE OF NEXT APPOINTMENT WITH DOCTOR: post-op visit with Dr. Donaldson 8/11 8:45     PLAN FOR NEXT VISIT:     MEDICAL NECESSITY FOR ONGOING SKILLED THERAPY: physical therapy medically necessary for treatment of: pain, decreased I with transfers, ambulation, ADLS following recent L4-S1 decompression surgery (right side). Requires instruction in appropriate progression of exercises; education in fall prevention/pain/edema management; gait training to reduce reliance on caregiver; transfert training; instruction in spinal precautions; balance retraining to prevent falls. Without skilled physical therapy, patient at risk for: chronic pain, rehospitalization; increased reliance on caregivers     SPECIFIC INTERVENTIONS AND GOALS TO ADDRESS ON NEXT VISIT:   - progress HEP   - pain/edema management   - gait training to restore normal mechanics with cane      FREQUENCY AND DURATION:   2 week 2     ANY OTHER FOLLOW UP NEEDED: none     REASSESSMENT DUE DATE: 30 day 8/31/22"

## 2022-08-08 ENCOUNTER — HOME CARE VISIT (OUTPATIENT)
Dept: HOME HEALTH SERVICES | Facility: HOME HEALTHCARE | Age: 72
End: 2022-08-08

## 2022-08-08 VITALS
DIASTOLIC BLOOD PRESSURE: 56 MMHG | TEMPERATURE: 97.7 F | OXYGEN SATURATION: 95 % | RESPIRATION RATE: 18 BRPM | SYSTOLIC BLOOD PRESSURE: 112 MMHG | HEART RATE: 73 BPM

## 2022-08-08 PROCEDURE — G0151 HHCP-SERV OF PT,EA 15 MIN: HCPCS

## 2022-08-08 NOTE — HOME HEALTH
Subjective: Patient reports that his back is feeling great. Patient reports that he has been doing his exercises but has been intermittent with walking with his walker.     Assessment: Patient did well today with all of his exercises.  He has not been using his walke rmuch so therapist recommended that he use his cane around the home and his walker at night and anytime he went outside.  He did well ambulating with the cane safely.     Plan for Next Visit:  - gait training

## 2022-08-09 ENCOUNTER — OFFICE VISIT (OUTPATIENT)
Dept: INTERNAL MEDICINE | Facility: CLINIC | Age: 72
End: 2022-08-09

## 2022-08-09 VITALS
HEART RATE: 78 BPM | OXYGEN SATURATION: 96 % | WEIGHT: 193 LBS | SYSTOLIC BLOOD PRESSURE: 132 MMHG | BODY MASS INDEX: 28.09 KG/M2 | TEMPERATURE: 98.2 F | DIASTOLIC BLOOD PRESSURE: 76 MMHG

## 2022-08-09 DIAGNOSIS — E78.2 MIXED HYPERLIPIDEMIA: ICD-10-CM

## 2022-08-09 DIAGNOSIS — M48.062 SPINAL STENOSIS OF LUMBAR REGION WITH NEUROGENIC CLAUDICATION: Primary | ICD-10-CM

## 2022-08-09 DIAGNOSIS — E11.42 TYPE 2 DIABETES MELLITUS WITH DIABETIC POLYNEUROPATHY, WITH LONG-TERM CURRENT USE OF INSULIN: ICD-10-CM

## 2022-08-09 DIAGNOSIS — Z79.4 TYPE 2 DIABETES MELLITUS WITH DIABETIC POLYNEUROPATHY, WITH LONG-TERM CURRENT USE OF INSULIN: ICD-10-CM

## 2022-08-09 PROCEDURE — 99495 TRANSJ CARE MGMT MOD F2F 14D: CPT | Performed by: FAMILY MEDICINE

## 2022-08-09 PROCEDURE — 1111F DSCHRG MED/CURRENT MED MERGE: CPT | Performed by: FAMILY MEDICINE

## 2022-08-09 NOTE — PROGRESS NOTES
"Chief Complaint  Hospital Follow Up Visit    Subjective        Andrea Fam presents to [facility]  Patient has done great a little less than 2 weeks postop lumbar surgery.  Surgical history reviewed he is making remarkable progress.  Discharge date was 7/31/2022 admission date 7/28/2022.    Function in the legs appears excellent.  He is using a walker at this time.  He has neurosurgical follow-up.    Transitional Care Management Certification  I certify that the following are true:  Communication was made within 2 business days of discharge.  Complexity of Medical Decision Making is moderate.  Face to face visit occurred within 10 days.    *Note: 93034 is for high complexity patients with a face to face visit within 7 days of discharge.  97711 is for high complexity patients with a face to face on days 8-14 post discharge or medium complexity with face to face visit within 14 days post discharge.    Patient is doing well with diabetes type 2 insulin requiring as well as hypothyroidism hyperlipidemia.        Objective   Vital Signs:  /76 (BP Location: Left arm, Patient Position: Sitting, Cuff Size: Adult)   Pulse 78   Temp 98.2 °F (36.8 °C) (Tympanic)   Wt 87.5 kg (193 lb)   SpO2 96%   BMI 28.09 kg/m²   Estimated body mass index is 28.09 kg/m² as calculated from the following:    Height as of 7/28/22: 176.5 cm (69.5\").    Weight as of this encounter: 87.5 kg (193 lb).          Physical Exam  Vitals reviewed.   Constitutional:       Appearance: He is well-developed.   HENT:      Head: Normocephalic and atraumatic.      Right Ear: Tympanic membrane and external ear normal.      Left Ear: Tympanic membrane and external ear normal.      Nose: Nose normal.   Eyes:      Conjunctiva/sclera: Conjunctivae normal.      Pupils: Pupils are equal, round, and reactive to light.   Neck:      Thyroid: No thyromegaly.      Vascular: No JVD.   Cardiovascular:      Rate and Rhythm: Normal rate " and regular rhythm.      Heart sounds: Normal heart sounds.   Pulmonary:      Effort: Pulmonary effort is normal.      Breath sounds: Normal breath sounds.   Abdominal:      General: Bowel sounds are normal.      Palpations: Abdomen is soft.   Musculoskeletal:      Cervical back: Normal range of motion and neck supple.      Lumbar back: Decreased range of motion.        Back:    Lymphadenopathy:      Cervical: No cervical adenopathy.   Skin:     General: Skin is warm and dry.      Findings: No rash.   Neurological:      Mental Status: He is alert and oriented to person, place, and time.      Cranial Nerves: No cranial nerve deficit.      Sensory: Sensation is intact.      Motor: No weakness.      Coordination: Coordination normal.   Psychiatric:         Behavior: Behavior normal.         Thought Content: Thought content normal.         Judgment: Judgment normal.        Result Review :  The following data was reviewed by: Suresh Rolon MD on 08/09/2022:  Common labs    Common Labsle 7/29/22 7/29/22 7/30/22 7/30/22 7/31/22 7/31/22    0434 0434 0348 0348 0359 0359   Glucose 174 (A)   140 (A)  87   BUN 12   12  14   Creatinine 0.78   0.72 (A)  0.77   Sodium 139   141  137   Potassium 5.0   4.2  4.1   Chloride 104   102  100   Calcium 9.3   8.8  9.3   WBC  9.91 9.03  8.65    Hemoglobin  13.2 13.1  14.0    Hematocrit  39.6 38.8  41.1    Platelets  223 194  202    (A) Abnormal value                      Assessment and Plan   Diagnoses and all orders for this visit:    1. Spinal stenosis of lumbar region with neurogenic claudication (Primary)  Comments:  Postop successful lumbar surgery    2. Mixed hyperlipidemia    3. Type 2 diabetes mellitus with diabetic polyneuropathy, with long-term current use of insulin (HCC)             Follow Up   No follow-ups on file.  Patient was given instructions and counseling regarding his condition or for health maintenance advice. Please see specific information pulled into the AVS if  appropriate.

## 2022-08-10 ENCOUNTER — READMISSION MANAGEMENT (OUTPATIENT)
Dept: CALL CENTER | Facility: HOSPITAL | Age: 72
End: 2022-08-10

## 2022-08-10 NOTE — OUTREACH NOTE
Medical Week 2 Survey    Flowsheet Row Responses   [facility] patient discharged from? [place]   Does the patient have one of the following disease processes/diagnoses(primary or secondary)? Other   Week 2 attempt successful? Yes   Call start time 1524   Discharge diagnosis DDD (degenerative disc disease), lumbar   Call end time 1525   Is the patient taking all medications as directed (includes completed medication regime)? Yes   Comments regarding appointments Surgical f/u on 8/11/22   Does the patient have a primary care provider?  Yes   Comments regarding PCP seen PCP on 8/9   Has the patient kept scheduled appointments due by today? Yes   What is the Home health agency?  [facility] .   Home health comments HH is still coming   Psychosocial issues? No   What is the patient's perception of their health status since discharge? Improving   Is the patient/caregiver able to teach back the hierarchy of who to call/visit for symptoms/problems? PCP, Specialist, Home health nurse, Urgent Care, ED, 911 Yes   Week 2 Call Completed? Yes   Graduated Yes   Is the patient interested in additional calls from an ambulatory ?  NOTE:  applies to high risk patients requiring additional follow-up. No   Did the patient feel the follow up calls were helpful during their recovery period? Yes   Was the number of calls appropriate? Yes   Graduated/Revoked comments Doing well, no further calls needed.          JACQUELYN RODRIGUEZ - Registered Nurse

## 2022-08-11 ENCOUNTER — TELEPHONE (OUTPATIENT)
Dept: NEUROSURGERY | Facility: CLINIC | Age: 72
End: 2022-08-11

## 2022-08-11 ENCOUNTER — OFFICE VISIT (OUTPATIENT)
Dept: NEUROSURGERY | Facility: CLINIC | Age: 72
End: 2022-08-11

## 2022-08-11 VITALS
HEIGHT: 70 IN | OXYGEN SATURATION: 95 % | DIASTOLIC BLOOD PRESSURE: 62 MMHG | TEMPERATURE: 97.8 F | HEART RATE: 69 BPM | SYSTOLIC BLOOD PRESSURE: 122 MMHG | BODY MASS INDEX: 27.63 KG/M2 | WEIGHT: 193 LBS

## 2022-08-11 DIAGNOSIS — Z09 POSTOP CHECK: Primary | ICD-10-CM

## 2022-08-11 DIAGNOSIS — M48.062 SPINAL STENOSIS OF LUMBAR REGION WITH NEUROGENIC CLAUDICATION: ICD-10-CM

## 2022-08-11 PROCEDURE — 99024 POSTOP FOLLOW-UP VISIT: CPT | Performed by: NURSE PRACTITIONER

## 2022-08-11 NOTE — PROGRESS NOTES
" HPI:   Andrea Fam is a 72 y.o. male for follow-up of spinal stenosis of lumbar region with neurogenic claudication and RLE radiculopathy. He is status post right L4/5 decompression/discectomy and L5-S1 open decompression and CSF durotomy repair on July 28, 2022 with Dr. Donaldson. He was discharged to home with home health.    Today Mr. Fam reports he is doing well. He reports his back pain has subsided. He denies any leg pain. He denies any numbness or tingling. He reports of leg weakness. He denies any bowel or bladder incontinence. He denies any redness, swelling or drainage around the incison. He reports he is not taking anything currently for pain. He is interested in outpatient PT. He is still in  PT. he is on gabapentin 600 mg 3 times daily for lower extremity neuropathy.    He presents accompanied by his spouse.     Review of Systems   Constitutional: Negative for activity change.   Musculoskeletal: Negative for back pain.   Neurological: Negative for weakness and numbness.   Psychiatric/Behavioral: Negative for sleep disturbance.        /62   Pulse 69   Temp 97.8 °F (36.6 °C)   Ht 176.5 cm (69.5\")   Wt 87.5 kg (193 lb)   SpO2 95%   BMI 28.09 kg/m²     Physical Exam  Vitals reviewed.   Constitutional:       Appearance: Normal appearance.   Pulmonary:      Effort: Pulmonary effort is normal.   Skin:     General: Skin is warm and dry.      Comments: Midline lumbar incision well approximated with no redness drainage swelling.   Neurological:      General: No focal deficit present.      Mental Status: He is alert.   Psychiatric:         Mood and Affect: Mood normal.         Speech: Speech normal.         Thought Content: Thought content normal.       Neurologic Exam     Mental Status   Speech: speech is normal   Knowledge: good.   Normal comprehension.     Motor Exam   Muscle bulk: normal  Overall muscle tone: normal  5/5 lateral lower extremities     Sensory Exam   Right leg light touch: " "decreased from knee  Left leg light touch: decreased from knee    Gait, Coordination, and Reflexes     Gait  Gait: (Stable with straight cane)  Difficulty heel and toe walking due to neuropathy       Findings/Results:  No new imaging    Assessment/Plan:  Diagnoses and all orders for this visit:    1. Postop check (Primary)  -     Ambulatory Referral to Physical Therapy Evaluate and treat, POST OP    2. Spinal stenosis of lumbar region with neurogenic claudication  -     Ambulatory Referral to Physical Therapy Evaluate and treat, POST OP        Discussion/Summary  Patient presents for first postoperative visit after two-level lumbar decompression and discectomy.  He had a incidental durotomy which was repaired.  He is doing well with resolution of leg symptoms.  He has no significant back pain.  Incision looks good.  He is working with home health therapy but would like to transition outpatient therapy to work on strengthening and gait.  He has some chronic neuropathy which also contributes to gait issues.  No focal neurologic deficits on exam.  He has been given both verbal and written postoperative instructions today.  I will refer him to outpatient PT.  He will follow-up in 6 weeks and call in the meantime with any questions or concerns.    Plan: Return in about 6 weeks (around 9/22/2022) for Follow-up with [facility].         Patient Care Team    Patient Care Team:  Suresh Rolon MD as PCP - General (Family Medicine)  Asya Henao MD as Consulting Physician (Endocrinology)  John Mcguire MD as Consulting Physician (Pulmonary Disease)  Tamia Carrillo MD as Consulting Physician (Ophthalmology)  Jamal Prince MD as Consulting Physician (Orthopedic Surgery)    Nena Frey, APRN  8/11/2022    \"Dictated utilizing Dragon dictation\".    "

## 2022-08-12 ENCOUNTER — HOME CARE VISIT (OUTPATIENT)
Dept: HOME HEALTH SERVICES | Facility: HOME HEALTHCARE | Age: 72
End: 2022-08-12

## 2022-08-12 PROCEDURE — G0151 HHCP-SERV OF PT,EA 15 MIN: HCPCS

## 2022-08-13 VITALS
OXYGEN SATURATION: 97 % | TEMPERATURE: 97.4 F | SYSTOLIC BLOOD PRESSURE: 118 MMHG | RESPIRATION RATE: 17 BRPM | HEART RATE: 69 BPM | DIASTOLIC BLOOD PRESSURE: 60 MMHG

## 2022-08-17 DIAGNOSIS — E11.42 TYPE 2 DIABETES MELLITUS WITH PERIPHERAL NEUROPATHY: ICD-10-CM

## 2022-08-17 RX ORDER — GABAPENTIN 300 MG/1
600 CAPSULE ORAL 3 TIMES DAILY
Qty: 540 CAPSULE | Refills: 1 | Status: SHIPPED | OUTPATIENT
Start: 2022-08-17 | End: 2022-10-16

## 2022-08-18 PROCEDURE — G0180 MD CERTIFICATION HHA PATIENT: HCPCS | Performed by: NEUROLOGICAL SURGERY

## 2022-08-22 ENCOUNTER — TREATMENT (OUTPATIENT)
Dept: PHYSICAL THERAPY | Facility: CLINIC | Age: 72
End: 2022-08-22

## 2022-08-22 DIAGNOSIS — Z98.1 S/P LUMBAR FUSION: Primary | ICD-10-CM

## 2022-08-22 DIAGNOSIS — R29.898 WEAKNESS OF BACK: ICD-10-CM

## 2022-08-22 DIAGNOSIS — R19.8 ABDOMINAL WEAKNESS: ICD-10-CM

## 2022-08-22 PROCEDURE — 97161 PT EVAL LOW COMPLEX 20 MIN: CPT | Performed by: PHYSICAL THERAPIST

## 2022-08-22 PROCEDURE — 97110 THERAPEUTIC EXERCISES: CPT | Performed by: PHYSICAL THERAPIST

## 2022-08-22 NOTE — PROGRESS NOTES
Physical Therapy Initial Evaluation and Plan of Care      Patient: Andrea Fam   : 1950  Diagnosis/ICD-10 Code:  S/P lumbar fusion [Z98.1]  Referring practitioner: Isidoro Donaldson MD    Subjective Evaluation    History of Present Illness  Date of surgery: 2022  Mechanism of injury: L4-5; L5-S1 fusion by Dr. Donaldson  From MD note:  He had a left L5-S1 microdiscectomy 37 years ago in .  At that time he had severe left buttock and leg pain from herniated disc and did quite well.  About 9 months ago he was lifting some heavy bag of rocks and began having some pain in his right buttock and leg.  It has not gotten any better.  He does not have any significant weakness.  Not much low back pain.  Is almost.  Sciatica.  He was found to have some spinal stenosis at L4-L5 and L5-S1.  There may be some superimposed disc protrusion at L4-L5.       Plan to golf in spring    Walking without cane this week.   R quad 90% strength since quad tear        Patient Occupation: retired Quality of life: good    Pain  Current pain ratin  Quality: discomfort  Relieving factors: rest and change in position  Aggravating factors: lifting, prolonged positioning, repetitive movement and standing  Progression: improved    Social Support  Lives in: multiple-level home  Lives with: spouse    Treatments  Discharged from (in last 30 days): inpatient hospitalization  Patient Goals  Patient goals for therapy: improved balance, increased strength, independence with ADLs/IADLs, return to sport/leisure activities and increased motion             Objective          Static Posture     Lumbar Spine   Decreased lordosis.     Postural Observations  Seated posture: good  Standing posture: good        Active Range of Motion     Lumbar   Flexion: Active lumbar flexion: 75% knees bent.   Extension: Active lumbar extension: 25%   Left rotation: WFL  Right rotation: WFL    Strength/Myotome Testing     Left Hip   Planes of Motion   Flexion:  5  Extension: 4  Abduction: 5  Adduction: 5  External rotation: 4+  Internal rotation: 5    Right Hip   Planes of Motion   Flexion: 4  Extension: 4-  Abduction: 5  Adduction: 5  External rotation: 4+  Internal rotation: 5    Left Knee   Flexion: 5  Extension: 4+    Right Knee   Flexion: 5  Extension: 5    Left Ankle/Foot   Dorsiflexion: 5  Plantar flexion: 5    Right Ankle/Foot   Dorsiflexion: 5  Plantar flexion: 5    Additional Strength Details  Lower abs 4-/5    Tests     Lumbar     Left   Negative femoral stretch and passive SLR.     Right   Negative femoral stretch and passive SLR.     Left Hip Flexibility Comments:   Severe hamstring, mod quad    Right Hip Flexibility Comments:   Severe hamstring and quad    Functional Assessment     Comments  Modified Oswestry 10%          Assessment & Plan     Assessment  Impairments: abnormal or restricted ROM, activity intolerance, impaired balance, impaired physical strength and lacks appropriate home exercise program  Functional Limitations: carrying objects, lifting, walking, pulling, pushing, standing, stooping and unable to perform repetitive tasks  Assessment details: Pt is a good candidate for skilled PT intervention to restore functional AROM and strength to return to previous level of ADL's.  Prognosis: good    Goals  Plan Goals: Short Term Goals: ( 3 weeks)  1.Pt to be independent with HEP  2. Pt to exhibit improved standing/walking tolerance to 20 minutes  3. Pt to demonstrate proper lifting technique  4. Pt to improve lower ab strength to 4/5 to allow for improved standing/stairclimbing tolerance    Long Term Goals (6 weeks )  1. Pt to demonstrate ability to lift 30# safely without LBP  2. Pt to exhibit > 4/5 hip extension and  lower abdominal strength to allow for more strenuous daily activities  3. Pt to exhibit lumbar flexion AROM to 100% to allow for reaching and bending with min to no pain  4. Pt to score 0% on Back Index    Plan  Therapy options: will be  seen for skilled therapy services  Planned modality interventions: cryotherapy and thermotherapy (hydrocollator packs)  Planned therapy interventions: abdominal trunk stabilization, body mechanics training, flexibility, functional ROM exercises, home exercise program, joint mobilization, manual therapy, neuromuscular re-education, postural training, soft tissue mobilization, strengthening, stretching and therapeutic activities  Frequency: 2x week  Duration in weeks: 6  Treatment plan discussed with: patient      Manual Therapy:         mins  03872;  Therapeutic Exercise:    25     mins  90702;     Neuromuscular Morgan:        mins  71329;    Therapeutic Activity:          mins  07747;     Gait Training:           mins  25018;     Ultrasound:          mins  67309;    Electrical Stimulation:         mins  20234 ( );  Dry Needling          mins self-pay    Timed Treatment:   25   mins   Total Treatment:     50   mins    PT SIGNATURE: Shelbi Gonzales PT   [place] License # [id number]  DATE TREATMENT INITIATED: 8/23/2022    Medicare Initial Certification  Certification Period: 11/21/2022  I certify that the therapy services are furnished while this patient is under my care.  The services outlined above are required by this patient, and will be reviewed every 90 days.     PHYSICIAN:       DATE:     Please sign and return via fax to [phone].. Thank you, [facility].

## 2022-08-24 ENCOUNTER — OFFICE VISIT (OUTPATIENT)
Dept: SLEEP MEDICINE | Facility: HOSPITAL | Age: 72
End: 2022-08-24

## 2022-08-24 VITALS — BODY MASS INDEX: 27.92 KG/M2 | WEIGHT: 195 LBS | HEIGHT: 70 IN | OXYGEN SATURATION: 97 %

## 2022-08-24 DIAGNOSIS — G47.33 OSA (OBSTRUCTIVE SLEEP APNEA): Primary | ICD-10-CM

## 2022-08-24 PROCEDURE — 99213 OFFICE O/P EST LOW 20 MIN: CPT | Performed by: INTERNAL MEDICINE

## 2022-08-24 NOTE — PROGRESS NOTES
"Follow Up [facility] Note     Chief Complaint:  LISANDRA     Primary Care Physician: Suresh Rolon MD    Interval History:   The patient is a 72 y.o. male  who I last saw 2021 and that note was reviewed.  The patient reports he is stable without new complaints.  He goes to bed 11:30 PM and awakens around 10 AM.  He will use the restroom during that time.    The patient did have a back operation 2022.    Review of Systems:    A complete review of systems was done and all were negative with the exception of the above    Social History:    Social History     Socioeconomic History   • Marital status:    Tobacco Use   • Smoking status: Former Smoker     Packs/day: 1.50     Years: 30.00     Pack years: 45.00     Types: Cigarettes     Quit date:      Years since quittin.6   • Smokeless tobacco: Never Used   Vaping Use   • Vaping Use: Never used   Substance and Sexual Activity   • Alcohol use: Yes     Comment: 2  drinks per  month   • Drug use: Never   • Sexual activity: Never       Allergies:  Lisinopril     Medication Review: His list was reviewed.      Vital Signs:    Vitals:    22 1100   SpO2: 97%   Weight: 88.5 kg (195 lb)   Height: 177.8 cm (70\")     Body mass index is 27.98 kg/m².    Physical Exam:    Constitutional:  Well developed 72 y.o. male that appears in no apparent distress.  Awake & oriented times 3.  Normal mood with normal recent and remote memory and normal judgement.  Eyes:  Conjunctivae normal.  Oropharynx: Previously, moist mucous membranes without exudate and a large tongue and normal uvula that is long and tapered and mild-moderate narrowing of the posterior pharyngeal opening and class II-3 Mallampati airway, patient is wearing a facemask.    Self-administered Virgie Sleepiness Scale test results: 1  0-5 Lower normal daytime sleepiness  6-10 Higher normal daytime sleepiness  11-12 Mild, 13-15 Moderate, & 16-24 Severe excessive daytime sleepiness     Downloaded " PAP Data Reviewed For Compliance:  DME is [facility] and he uses a nasal mask.  Downloads between 5/25 and 8/22/2022 compliance 99%.  Average usage is 9 hours and 24 minutes.  Average AHI is normal without leak.  Average auto CPAP pressure is 8 and his ResMed auto CPAP is 5-15    I have reviewed the above results and compared them with the patient's last downloads and reviewed with the patient.    Impression:   Obstructive sleep apnea adequately treated with ResMed auto CPAP. The patient appears to be at goal with good compliance and usage. The patient has no complaints of hypersomnolence.    Plan:  Good sleep hygiene measures should be maintained.  Weight loss would be beneficial in this patient who is overweight by BMI.      After evaluating the patient and assessing results available, the patient is benefiting from the treatment being provided.     The patient will continue ResMed auto CPAP.  After clinical evaluation and review of downloads, I recommend no changes to the patient's pressures.  A new prescription will be sent to the patient's DME.    I answered all of the patient's questions.  The patient will call for any problems and will follow up in 1 year.      John Mcguire MD  Sleep Medicine  08/24/22  12:01 EDT

## 2022-08-25 ENCOUNTER — TREATMENT (OUTPATIENT)
Dept: PHYSICAL THERAPY | Facility: CLINIC | Age: 72
End: 2022-08-25

## 2022-08-25 DIAGNOSIS — R19.8 ABDOMINAL WEAKNESS: ICD-10-CM

## 2022-08-25 DIAGNOSIS — R29.898 WEAKNESS OF BACK: ICD-10-CM

## 2022-08-25 DIAGNOSIS — Z98.1 S/P LUMBAR FUSION: Primary | ICD-10-CM

## 2022-08-25 PROCEDURE — 97112 NEUROMUSCULAR REEDUCATION: CPT | Performed by: PHYSICAL THERAPIST

## 2022-08-25 PROCEDURE — 97110 THERAPEUTIC EXERCISES: CPT | Performed by: PHYSICAL THERAPIST

## 2022-08-25 NOTE — PROGRESS NOTES
Physical Therapy Daily Treatment Note    Patient: Andrea Fam  : 1950  Referring practitioner: NATALIE Sal  Date of Initial Visit: Type: THERAPY  Noted: 2022  Today's Date: 2022  Patient seen for 2 sessions      Visit Diagnoses:    ICD-10-CM ICD-9-CM   1. S/P lumbar fusion  Z98.1 V45.4   2. Abdominal weakness  R19.8 789.9   3. Weakness of back  R29.898 724.8       VISIT#: 2    Subjective   Andrea Fam reports he does not have any pain. Reports compliance with current HEP.    Pain Rating (0-10): 0    Objective     See Exercise, Manual, and Modality Logs for complete treatment.     Patient Education: New exercises.    Assessment/Plan    Added multiple new strengthening exercises today. Pt was able to complete all exercises without c/o increased pain. Reviewed HEP, pt required minimal verbal cueing and occasional tactile cueing with HEP.    Progress per Plan of Care         Timed:         Manual Therapy:         mins  17769;     Therapeutic Exercise:    18     mins  90093;     Neuromuscular Morgan:    17    mins  94371;    Therapeutic Activity:          mins  13331;     Gait Training:           mins  70375;     Ultrasound:          mins  51538;    Ionto                                   mins   32422  Self Care                            mins   38471    Un-Timed:  Electrical Stimulation:         mins  16343 ( );  Traction          mins 12352  Re-Eval                               mins  67905    Timed Treatment:   35   mins   Total Treatment:     40   mins        Sada Daniel PT  Physical Therapist  [place] License #: [id number]  [place] License #: [id number]

## 2022-09-02 ENCOUNTER — TREATMENT (OUTPATIENT)
Dept: PHYSICAL THERAPY | Facility: CLINIC | Age: 72
End: 2022-09-02

## 2022-09-02 DIAGNOSIS — R19.8 ABDOMINAL WEAKNESS: ICD-10-CM

## 2022-09-02 DIAGNOSIS — Z98.1 S/P LUMBAR FUSION: Primary | ICD-10-CM

## 2022-09-02 DIAGNOSIS — R29.898 WEAKNESS OF BACK: ICD-10-CM

## 2022-09-02 PROCEDURE — 97110 THERAPEUTIC EXERCISES: CPT | Performed by: PHYSICAL THERAPIST

## 2022-09-02 PROCEDURE — 97140 MANUAL THERAPY 1/> REGIONS: CPT | Performed by: PHYSICAL THERAPIST

## 2022-09-02 NOTE — PROGRESS NOTES
Physical Therapy Daily Progress Note        Subjective     Andrea Fam reports: No complaints.    Objective   See Exercise, Manual, and Modality Logs for complete treatment.       Assessment/Plan  R > L knee and leg fatigued. Added Paloff press and prpogressed    Progress per Plan of Care           Manual Therapy:  8       mins  56235;  Therapeutic Exercise: 35      mins  08440;     Neuromuscular Morgan:       mins  80990;    Therapeutic Activity:         mins  90877;     Gait Training:         mins  62353;     Ultrasound:         mins  96989;    Electrical Stimulation:        mins  35595 ( );  Dry Needling         mins self-pay    Timed Treatment:   43   mins   Total Treatment:     43   mins    Shelbi Gonzales, PT  Physical Therapist  [place] License # [id number]

## 2022-09-07 ENCOUNTER — TREATMENT (OUTPATIENT)
Dept: PHYSICAL THERAPY | Facility: CLINIC | Age: 72
End: 2022-09-07

## 2022-09-07 DIAGNOSIS — Z98.1 S/P LUMBAR FUSION: Primary | ICD-10-CM

## 2022-09-07 DIAGNOSIS — R19.8 ABDOMINAL WEAKNESS: ICD-10-CM

## 2022-09-07 DIAGNOSIS — R29.898 WEAKNESS OF BACK: ICD-10-CM

## 2022-09-07 PROCEDURE — 97530 THERAPEUTIC ACTIVITIES: CPT | Performed by: PHYSICAL THERAPIST

## 2022-09-07 PROCEDURE — 97110 THERAPEUTIC EXERCISES: CPT | Performed by: PHYSICAL THERAPIST

## 2022-09-07 PROCEDURE — 97140 MANUAL THERAPY 1/> REGIONS: CPT | Performed by: PHYSICAL THERAPIST

## 2022-09-07 NOTE — PROGRESS NOTES
Physical Therapy Daily Treatment Note      Patient: Andrea Fam   : 1950  Referring practitioner: NATALIE Sal  Date of Initial Visit: Type: THERAPY  Noted: 2022  Today's Date: 2022  Patient seen for 4 sessions         Andrea Fam reports: his back pain is significantly better since surgery. Biggest obstacle is balance and strength in legs.        Subjective     Objective   See Exercise, Manual, and Modality Logs for complete treatment.   Exercise rationale/ pain free exercise performance  Anatomy and structure of affected musculature  Posture/Postural awareness  Lumbar support  Sleeping positions with pillows  Alternate exercise positions  Verbal/Tactile cues to ensure correct exercise performance/technique      Added:  Rocker board fwd/bwd - ROM and balance x 2 min each with TA contraction    Assessment/Plan  Subjectively pt reports good improvement of back and symptoms since surgery/start of PT, though feels his leg strength and balance are still off especially when walking on grassy area.  Objectively he is noted to move without discomfort/guarding with position changes or transitional movement.  Step/stride length and foot clearance are decreased at times during ambulation but able to correct with cuing.  Able to progress exercises in regards to repetitions and activity without increased symptoms though noted early fatigue with isolated mm performance.         Progress per Plan of Care toward all goals           Timed:  Manual Therapy:  15       mins  16345;  Therapeutic Exercise:    20     mins  01633;     Neuromuscular Morgan:  4      mins  11154;    Therapeutic Activity:    14      mins  38659;     Gait Training:           mins  09430;     Ultrasound:          mins  38163;      Untimed:  Electrical Stimulation:         mins  98282 ( );  Mechanical Traction:         mins  69463;     Timed Treatment:  53    mins   Total Treatment:    53    mins  Alverto Mathur PTA  Physical  Therapist  Assistant  [id number]

## 2022-09-12 ENCOUNTER — TREATMENT (OUTPATIENT)
Dept: PHYSICAL THERAPY | Facility: CLINIC | Age: 72
End: 2022-09-12

## 2022-09-12 DIAGNOSIS — R29.898 WEAKNESS OF BACK: ICD-10-CM

## 2022-09-12 DIAGNOSIS — R19.8 ABDOMINAL WEAKNESS: ICD-10-CM

## 2022-09-12 DIAGNOSIS — Z98.1 S/P LUMBAR FUSION: Primary | ICD-10-CM

## 2022-09-12 PROCEDURE — 97112 NEUROMUSCULAR REEDUCATION: CPT | Performed by: PHYSICAL THERAPIST

## 2022-09-12 PROCEDURE — 97140 MANUAL THERAPY 1/> REGIONS: CPT | Performed by: PHYSICAL THERAPIST

## 2022-09-12 PROCEDURE — 97110 THERAPEUTIC EXERCISES: CPT | Performed by: PHYSICAL THERAPIST

## 2022-09-12 NOTE — PROGRESS NOTES
Physical Therapy Daily Progress Note        Subjective     Andrea Fam reports: Balance getting better. Stronger and more endurance.    Objective   See Exercise, Manual, and Modality Logs for complete treatment.       Assessment/Plan  HAd more difficulty doing single leg leg press on R. DIfficulty balancing with retrostep and arm raise.    Progress per Plan of Care           Manual Therapy:  10       mins  31376;  Therapeutic Exercise: 20      mins  74830;     Neuromuscular Morgan:10       mins  61566;    Therapeutic Activity:    5     mins  65354;     Gait Training:         mins  78909;     Ultrasound:         mins  08227;    Electrical Stimulation:        mins  50339 ( );  Dry Needling         mins self-pay    Timed Treatment:   45   mins   Total Treatment:     45   mins    Shelbi Gonzales PT  Physical Therapist  [place] License # [id number]

## 2022-09-14 ENCOUNTER — TREATMENT (OUTPATIENT)
Dept: PHYSICAL THERAPY | Facility: CLINIC | Age: 72
End: 2022-09-14

## 2022-09-14 DIAGNOSIS — R19.8 ABDOMINAL WEAKNESS: ICD-10-CM

## 2022-09-14 DIAGNOSIS — R29.898 WEAKNESS OF BACK: ICD-10-CM

## 2022-09-14 DIAGNOSIS — Z98.1 S/P LUMBAR FUSION: Primary | ICD-10-CM

## 2022-09-14 PROCEDURE — 97140 MANUAL THERAPY 1/> REGIONS: CPT | Performed by: PHYSICAL THERAPIST

## 2022-09-14 PROCEDURE — 97110 THERAPEUTIC EXERCISES: CPT | Performed by: PHYSICAL THERAPIST

## 2022-09-14 PROCEDURE — 97112 NEUROMUSCULAR REEDUCATION: CPT | Performed by: PHYSICAL THERAPIST

## 2022-09-21 ENCOUNTER — OFFICE VISIT (OUTPATIENT)
Dept: NEUROSURGERY | Facility: CLINIC | Age: 72
End: 2022-09-21

## 2022-09-21 ENCOUNTER — TREATMENT (OUTPATIENT)
Dept: PHYSICAL THERAPY | Facility: CLINIC | Age: 72
End: 2022-09-21

## 2022-09-21 VITALS
HEART RATE: 59 BPM | WEIGHT: 195 LBS | BODY MASS INDEX: 27.92 KG/M2 | TEMPERATURE: 97.7 F | HEIGHT: 70 IN | SYSTOLIC BLOOD PRESSURE: 116 MMHG | OXYGEN SATURATION: 98 % | DIASTOLIC BLOOD PRESSURE: 80 MMHG

## 2022-09-21 DIAGNOSIS — R19.8 ABDOMINAL WEAKNESS: ICD-10-CM

## 2022-09-21 DIAGNOSIS — M48.062 SPINAL STENOSIS OF LUMBAR REGION WITH NEUROGENIC CLAUDICATION: Primary | ICD-10-CM

## 2022-09-21 DIAGNOSIS — R29.898 WEAKNESS OF BACK: ICD-10-CM

## 2022-09-21 DIAGNOSIS — Z98.1 S/P LUMBAR FUSION: Primary | ICD-10-CM

## 2022-09-21 DIAGNOSIS — M51.36 DDD (DEGENERATIVE DISC DISEASE), LUMBAR: ICD-10-CM

## 2022-09-21 PROCEDURE — 97110 THERAPEUTIC EXERCISES: CPT | Performed by: PHYSICAL THERAPIST

## 2022-09-21 PROCEDURE — 97530 THERAPEUTIC ACTIVITIES: CPT | Performed by: PHYSICAL THERAPIST

## 2022-09-21 PROCEDURE — 99024 POSTOP FOLLOW-UP VISIT: CPT | Performed by: NURSE PRACTITIONER

## 2022-09-21 PROCEDURE — 97112 NEUROMUSCULAR REEDUCATION: CPT | Performed by: PHYSICAL THERAPIST

## 2022-09-21 NOTE — PROGRESS NOTES
"Subjective   Patient ID: Andrea Fam is a 72 y.o. male is here today for follow-up of spinal stenosis of lumbar region with neurogenic claudication. He is status post open right lumbar four/ five, right lumbar five/sacral one decompression on 07/28/2022 with Dr. Donaldson.     History of Present Illness    Today Mr. Fam reports he is doing well. He reports he is not having any back pain. He denies any leg pain. He denies any numbness or tingling. He reports some leg weakness but therapy has helped. He denies any bowel or bladder incontinence. He reports he currently is not taking anything for pain.      The following portions of the patient's history were reviewed and updated as appropriate: allergies, current medications, past medical history, past social history and past surgical history.    Review of Systems   Constitutional: Negative for activity change.   Genitourinary: Negative for difficulty urinating.   Musculoskeletal: Negative for back pain.   Neurological: Positive for weakness. Negative for numbness.   Psychiatric/Behavioral: Negative for confusion.       Objective     Vitals:    09/21/22 0906   BP: 116/80   Pulse: 59   Temp: 97.7 °F (36.5 °C)   SpO2: 98%   Weight: 88.5 kg (195 lb)   Height: 177.8 cm (70\")     Body mass index is 27.98 kg/m².      Physical Exam  Vitals and nursing note reviewed.   Constitutional:       Appearance: Normal appearance.   Pulmonary:      Effort: Pulmonary effort is normal. No respiratory distress.   Musculoskeletal:         General: No tenderness. Normal range of motion.   Skin:     General: Skin is warm and dry.      Comments: lumbar incision well approximated, no erythema, swelling or drainage.   Neurological:      Mental Status: He is alert and oriented to person, place, and time. Mental status is at baseline.      Sensory: No sensory deficit.      Motor: No weakness.      Gait: Gait is intact.      Deep Tendon Reflexes: Strength normal.   Psychiatric:         Mood and " Affect: Mood normal.         Behavior: Behavior normal.       Neurologic Exam     Mental Status   Oriented to person, place, and time.     Motor Exam   Muscle bulk: normal  Overall muscle tone: normal    Strength   Strength 5/5 throughout.     Sensory Exam   Right leg light touch: normal  Left leg light touch: normal    Gait, Coordination, and Reflexes     Gait  Gait: normal          Assessment & Plan   Independent Review of Radiographic Studies:      There is no new imaging to review    Medical Decision Making:      The patient presents today for a 6 week postoperative check. He has done very well during recovery and is going to physical therapy. He has subjective weakness in his lower extremities though is 5/5 strength on exam. Recommend to continue physical therapy and since he is doing so well can follow up with us as needed.      Diagnoses and all orders for this visit:    1. Spinal stenosis of lumbar region with neurogenic claudication (Primary)    2. DDD (degenerative disc disease), lumbar      Return if symptoms worsen or fail to improve.

## 2022-09-21 NOTE — PROGRESS NOTES
Physical Therapy Daily Progress Note        Subjective     Andrea Fam reports: I saw Neuro APRN today. I dont need to see them anymore. I can wallk half mile now but legs get fatigued    Objective   See Exercise, Manual, and Modality Logs for complete treatment.       Assessment/Plan  ABle to increase leg press weight to 100# today.     Progress per Plan of Care           Manual Therapy:  8       mins  30729;  Therapeutic Exercise: 20      mins  92138;     Neuromuscular Morgan:10       mins  64929;    Therapeutic Activity:    8     mins  42827;     Gait Training:         mins  89502;     Ultrasound:         mins  88303;    Electrical Stimulation:        mins  88363 ( );  Dry Needling         mins self-pay    Timed Treatment:   46   mins   Total Treatment:     46   mins    Shelbi Gonzales PT  Physical Therapist  [place] License # [id number]

## 2022-09-23 ENCOUNTER — TREATMENT (OUTPATIENT)
Dept: PHYSICAL THERAPY | Facility: CLINIC | Age: 72
End: 2022-09-23

## 2022-09-23 DIAGNOSIS — R29.898 WEAKNESS OF BACK: ICD-10-CM

## 2022-09-23 DIAGNOSIS — R19.8 ABDOMINAL WEAKNESS: ICD-10-CM

## 2022-09-23 DIAGNOSIS — Z98.1 S/P LUMBAR FUSION: Primary | ICD-10-CM

## 2022-09-23 PROCEDURE — 97140 MANUAL THERAPY 1/> REGIONS: CPT | Performed by: PHYSICAL THERAPIST

## 2022-09-23 PROCEDURE — 97112 NEUROMUSCULAR REEDUCATION: CPT | Performed by: PHYSICAL THERAPIST

## 2022-09-23 PROCEDURE — 97110 THERAPEUTIC EXERCISES: CPT | Performed by: PHYSICAL THERAPIST

## 2022-09-23 NOTE — PROGRESS NOTES
Re-Assessment / Re-Certification      Patient: Andrea Fam   : 1950  Diagnosis/ICD-10 Code:  S/P lumbar fusion [Z98.1]  Referring practitioner: NATALIE Sal  Date of Initial Visit: 2022  Today's Date: 2022  Patient seen for 8 sessions      Subjective:   Andrea Fam reports: Doing well. No pain. Still on 20# lifting restriction  Subjective Questionnaire: Oswestry: 10%  Clinical Progress: improved  Home Program Compliance: Yes  Treatment has included: therapeutic exercise, neuromuscular re-education, manual therapy and therapeutic activity    Objective   Practiced proper squat and lift with 15#     Lumbar flexion 100% knees bent  Extension 25%  SB 75% ROT B 75%  LE strength 5/5 B  Lower abs 4/5    Assessment/Plan   Short Term Goals: ( 3 weeks)  1.Pt to be independent with HEP  2. Pt to exhibit improved standing/walking tolerance to 20 minutes  3. Pt to demonstrate proper lifting technique  4. Pt to improve lower ab strength to 4/5 to allow for improved standing/stairclimbing tolerance    Long Term Goals (6 weeks ) PROGRESSING  1. Pt to demonstrate ability to lift 30# safely without LBP  2. Pt to exhibit > 4/5 hip extension and  lower abdominal strength to allow for more strenuous daily activities  3. Pt to exhibit lumbar flexion AROM to 100% to allow for reaching and bending with min to no pain  4. Pt to score 0% on Back Index  Progress toward previous goals: Partially Met        Recommendations: Continue as planned  Timeframe: 3 weeks  Prognosis to achieve goals: good    PT Signature: Shelbi Gonzales, PT  [place] License # [id number]    Based upon review of the patient's progress and continued therapy plan, it is my medical opinion that Andrae Fam should continue physical therapy treatment at [facility]AP[facility]  [address] [facility], [address] [address] [phone].    Signature: __________________________________  Nena Frey APRN    Manual  Therapy:    8      mins  27979;  Therapeutic Exercise:    20     mins  79746;     Neuromuscular Morgan:    10    mins  69732;    Therapeutic Activity:     5     mins  44591;     Gait Training:           mins  83213;     Ultrasound:          mins  09678;    Electrical Stimulation:         mins  56579 ( );  Dry Needling          mins self-pay    Timed Treatment:   43   mins   Total Treatment:     43   mins

## 2022-09-28 ENCOUNTER — TREATMENT (OUTPATIENT)
Dept: PHYSICAL THERAPY | Facility: CLINIC | Age: 72
End: 2022-09-28

## 2022-09-28 DIAGNOSIS — R19.8 ABDOMINAL WEAKNESS: ICD-10-CM

## 2022-09-28 DIAGNOSIS — R29.898 WEAKNESS OF BACK: ICD-10-CM

## 2022-09-28 DIAGNOSIS — Z98.1 S/P LUMBAR FUSION: Primary | ICD-10-CM

## 2022-09-28 PROCEDURE — 97110 THERAPEUTIC EXERCISES: CPT | Performed by: PHYSICAL THERAPIST

## 2022-09-28 NOTE — PROGRESS NOTES
Physical Therapy Daily Treatment Note      Patient: Andrea Fam   : 1950  Referring practitioner: NATALIE Sal  Date of Initial Visit: Type: THERAPY  Noted: 2022  Today's Date: 2022  Patient seen for 9 sessions       Visit Diagnoses:    ICD-10-CM ICD-9-CM   1. S/P lumbar fusion  Z98.1 V45.4   2. Abdominal weakness  R19.8 789.9   3. Weakness of back  R29.898 724.8       Subjective   No new changes.    Objective   See Exercise, Manual, and Modality Logs for complete treatment.       Assessment/Plan  No complaints w/ therapeutic exercise routine. Declined need for manual stretching. Progress per POC.    Timed:         Manual Therapy:    0     mins  97319;     Therapeutic Exercise:    35     mins  64113;     Neuromuscular Morgan:    0    mins  51339;    Therapeutic Activity:     0     mins  87260;     Gait Trainin     mins  45026;     Ultrasound:     0     mins  26808;    Ionto                               0    mins   10245        Timed Treatment:   35   mins   Total Treatment:     45   mins    Opal Stubbs, PT  [place] License: [id number]

## 2022-09-29 RX ORDER — DULAGLUTIDE 1.5 MG/.5ML
INJECTION, SOLUTION SUBCUTANEOUS
Qty: 6 ML | Refills: 1 | Status: SHIPPED | OUTPATIENT
Start: 2022-09-29

## 2022-10-04 ENCOUNTER — LAB (OUTPATIENT)
Dept: ENDOCRINOLOGY | Age: 72
End: 2022-10-04

## 2022-10-04 DIAGNOSIS — E03.9 ACQUIRED HYPOTHYROIDISM: ICD-10-CM

## 2022-10-04 DIAGNOSIS — E11.42 TYPE 2 DIABETES MELLITUS WITH PERIPHERAL NEUROPATHY: Primary | ICD-10-CM

## 2022-10-04 DIAGNOSIS — E11.42 TYPE 2 DIABETES MELLITUS WITH PERIPHERAL NEUROPATHY: ICD-10-CM

## 2022-10-04 DIAGNOSIS — E78.2 MIXED HYPERLIPIDEMIA: ICD-10-CM

## 2022-10-04 RX ORDER — DAPAGLIFLOZIN 10 MG/1
5 TABLET, FILM COATED ORAL EVERY MORNING
Qty: 90 TABLET | Refills: 1 | Status: SHIPPED | OUTPATIENT
Start: 2022-10-04 | End: 2022-10-14 | Stop reason: SDUPTHER

## 2022-10-05 ENCOUNTER — TREATMENT (OUTPATIENT)
Dept: PHYSICAL THERAPY | Facility: CLINIC | Age: 72
End: 2022-10-05

## 2022-10-05 DIAGNOSIS — R19.8 ABDOMINAL WEAKNESS: ICD-10-CM

## 2022-10-05 DIAGNOSIS — R29.898 WEAKNESS OF BACK: ICD-10-CM

## 2022-10-05 DIAGNOSIS — Z98.1 S/P LUMBAR FUSION: Primary | ICD-10-CM

## 2022-10-05 LAB
ALBUMIN SERPL-MCNC: 5 G/DL (ref 3.5–5.2)
ALBUMIN/CREAT UR: 48 MG/G CREAT (ref 0–29)
ALBUMIN/GLOB SERPL: 2.6 G/DL
ALP SERPL-CCNC: 67 U/L (ref 39–117)
ALT SERPL-CCNC: 29 U/L (ref 1–41)
AST SERPL-CCNC: 21 U/L (ref 1–40)
BILIRUB SERPL-MCNC: 0.5 MG/DL (ref 0–1.2)
BUN SERPL-MCNC: 16 MG/DL (ref 8–23)
BUN/CREAT SERPL: 16.7 (ref 7–25)
CALCIUM SERPL-MCNC: 10.6 MG/DL (ref 8.6–10.5)
CHLORIDE SERPL-SCNC: 103 MMOL/L (ref 98–107)
CHOLEST SERPL-MCNC: 125 MG/DL (ref 0–200)
CO2 SERPL-SCNC: 26.7 MMOL/L (ref 22–29)
CREAT SERPL-MCNC: 0.96 MG/DL (ref 0.76–1.27)
CREAT UR-MCNC: 67.8 MG/DL
EGFRCR SERPLBLD CKD-EPI 2021: 82.9 ML/MIN/1.73
GLOBULIN SER CALC-MCNC: 1.9 GM/DL
GLUCOSE SERPL-MCNC: 165 MG/DL (ref 65–99)
HBA1C MFR BLD: 7.6 % (ref 4.8–5.6)
HDLC SERPL-MCNC: 35 MG/DL (ref 40–60)
IMP & REVIEW OF LAB RESULTS: NORMAL
LDLC SERPL CALC-MCNC: 62 MG/DL (ref 0–100)
MICROALBUMIN UR-MCNC: 32.8 UG/ML
POTASSIUM SERPL-SCNC: 5.6 MMOL/L (ref 3.5–5.2)
PROT SERPL-MCNC: 6.9 G/DL (ref 6–8.5)
SODIUM SERPL-SCNC: 143 MMOL/L (ref 136–145)
T3FREE SERPL-MCNC: 3.1 PG/ML (ref 2–4.4)
T4 FREE SERPL-MCNC: 1.13 NG/DL (ref 0.93–1.7)
TRIGL SERPL-MCNC: 165 MG/DL (ref 0–150)
TSH SERPL DL<=0.005 MIU/L-ACNC: 0.9 UIU/ML (ref 0.27–4.2)
VLDLC SERPL CALC-MCNC: 28 MG/DL (ref 5–40)

## 2022-10-05 PROCEDURE — 97110 THERAPEUTIC EXERCISES: CPT | Performed by: PHYSICAL THERAPIST

## 2022-10-05 PROCEDURE — 97140 MANUAL THERAPY 1/> REGIONS: CPT | Performed by: PHYSICAL THERAPIST

## 2022-10-06 NOTE — PROGRESS NOTES
Physical Therapy Discharge Note  Visit # 10      Subjective     Andrea Fam reports: Doing well. Ready for DC    Objective   See Exercise, Manual, and Modality Logs for complete treatment.     Lumbar AROM WFL   LE strength 5/5 throughout      Assessment/Plan  Short Term Goals: ( 3 weeks)  1.Pt to be independent with HEP  2. Pt to exhibit improved standing/walking tolerance to 20 minutes  3. Pt to demonstrate proper lifting technique  4. Pt to improve lower ab strength to 4/5 to allow for improved standing/stairclimbing tolerance    Long Term Goals (6 weeks )   1. Pt to demonstrate ability to lift 30# safely without LBP mET  2. Pt to exhibit > 4/5 hip extension and  lower abdominal strength to allow for more strenuous daily activities MET  3. Pt to exhibit lumbar flexion AROM to 100% to allow for reaching and bending with min to no pain MET except extension  4. Pt to score 0% on Back Index MET except lifting heavy weight    Other. DC           Manual Therapy:  10      mins  61899;  Therapeutic Exercise: 20      mins  79532;     Neuromuscular Morgan:       mins  92903;    Therapeutic Activity:         mins  77144;     Gait Training:         mins  00771;     Ultrasound:         mins  44653;    Electrical Stimulation:        mins  15801 ( );  Dry Needling         mins self-pay    Timed Treatment:   30   mins   Total Treatment:     30   mins    Shelbi Gonzales, PT  Physical Therapist  [place] License # [id number]

## 2022-10-11 ENCOUNTER — OFFICE VISIT (OUTPATIENT)
Dept: ENDOCRINOLOGY | Age: 72
End: 2022-10-11

## 2022-10-11 VITALS
TEMPERATURE: 96.8 F | HEART RATE: 61 BPM | SYSTOLIC BLOOD PRESSURE: 120 MMHG | HEIGHT: 70 IN | DIASTOLIC BLOOD PRESSURE: 70 MMHG | WEIGHT: 195.4 LBS | OXYGEN SATURATION: 97 % | BODY MASS INDEX: 27.97 KG/M2

## 2022-10-11 DIAGNOSIS — E83.52 SERUM CALCIUM ELEVATED: ICD-10-CM

## 2022-10-11 DIAGNOSIS — E87.5 SERUM POTASSIUM ELEVATED: ICD-10-CM

## 2022-10-11 DIAGNOSIS — E11.42 TYPE 2 DIABETES MELLITUS WITH PERIPHERAL NEUROPATHY: Primary | ICD-10-CM

## 2022-10-11 DIAGNOSIS — E03.9 ACQUIRED HYPOTHYROIDISM: ICD-10-CM

## 2022-10-11 DIAGNOSIS — E78.2 MIXED HYPERLIPIDEMIA: ICD-10-CM

## 2022-10-11 PROCEDURE — 99214 OFFICE O/P EST MOD 30 MIN: CPT | Performed by: NURSE PRACTITIONER

## 2022-10-11 NOTE — PROGRESS NOTES
"Chief Complaint  Diabetes (Type2: patient doesn't have a meter with him today states that he doesn't check his bs, has no hx of retinopathy with a severe case of neuropathy in his hands and feet )    Subjective        Andrea Fam presents to [facility] ENDOCR[facility]OLOGY  History of Present Illness     Noted elevated calcium, potassium     Type 2 dm    Diagnosed about 10+ years ago.   Today in clinic pt reports being on metformin 1000 mg twice daily, Toujeo 28 units daily, Farxiga 10 mg (was previously doing 5mg daily- only on 10mg for a few days), trulicity 1.5mg weekly    Checks BG -no  Dm retinopathy -no history, Last eye exam -up-to-date 2022, going tomorrow for laser treatment (cataracts)  Dm nephropathy - + proteinuria  Dm neuropathy -yes, on Neurontin 600mg TIDAC, asking to increase dose   CAD -x  CVA -x  Episodes of hypoglycemia - denies  On statin    Lab Results   Component Value Date    CHLPL 125 10/04/2022    TRIG 165 (H) 10/04/2022    HDL 35 (L) 10/04/2022    LDL 62 10/04/2022          Hypothyroidism  On levothyroxine 50 mcg oral daily  Lab Results   Component Value Date    TSH 0.898 10/04/2022       Objective   Vital Signs:  /70   Pulse 61   Temp 96.8 °F (36 °C) (Temporal)   Ht 177.8 cm (70\")   Wt 88.6 kg (195 lb 6.4 oz)   SpO2 97%   BMI 28.04 kg/m²   Estimated body mass index is 28.04 kg/m² as calculated from the following:    Height as of this encounter: 177.8 cm (70\").    Weight as of this encounter: 88.6 kg (195 lb 6.4 oz).          Physical Exam   Result Review :                Assessment and Plan   Diagnoses and all orders for this visit:    1. Type 2 diabetes mellitus with peripheral neuropathy (HCC) (Primary)  -     Hemoglobin A1c; Future  -     Comprehensive Metabolic Panel; Future  -     Lipid Panel; Future  -     Microalbumin / Creatinine Urine Ratio - Urine, Clean Catch; Future  -     Vitamin D 25 Hydroxy; Future  -     TSH; Future  -     T4, Free; Future  -     T3, " Free; Future  -     PTH, Intact; Future    2. Serum potassium elevated  -     Basic Metabolic Panel    3. Serum calcium elevated  -     Vitamin D 25 Hydroxy  -     PTH, Intact  -     Hemoglobin A1c; Future  -     Comprehensive Metabolic Panel; Future  -     Lipid Panel; Future  -     Microalbumin / Creatinine Urine Ratio - Urine, Clean Catch; Future  -     Vitamin D 25 Hydroxy; Future  -     TSH; Future  -     T4, Free; Future  -     T3, Free; Future  -     PTH, Intact; Future    4. Mixed hyperlipidemia  -     Hemoglobin A1c; Future  -     Comprehensive Metabolic Panel; Future  -     Lipid Panel; Future  -     Microalbumin / Creatinine Urine Ratio - Urine, Clean Catch; Future  -     Vitamin D 25 Hydroxy; Future  -     TSH; Future  -     T4, Free; Future  -     T3, Free; Future  -     PTH, Intact; Future    5. Acquired hypothyroidism  -     Hemoglobin A1c; Future  -     Comprehensive Metabolic Panel; Future  -     Lipid Panel; Future  -     Microalbumin / Creatinine Urine Ratio - Urine, Clean Catch; Future  -     Vitamin D 25 Hydroxy; Future  -     TSH; Future  -     T4, Free; Future  -     T3, Free; Future  -     PTH, Intact; Future             Follow Up   No follow-ups on file.     Repeat K+, Ca, vitamin d and PTH levels today  a1c improving, will adjust farxiga if Ca remains elevated  Continue regimen as above for now  Continue statin  Continue current T4 dose  LDL at goal  TSH stable    Patient was given instructions and counseling regarding his condition or for health maintenance advice. Please see specific information pulled into the AVS if appropriate.     NATALIE Hernandez

## 2022-10-12 LAB
25(OH)D3+25(OH)D2 SERPL-MCNC: 73.5 NG/ML (ref 30–100)
BUN SERPL-MCNC: 16 MG/DL (ref 8–27)
BUN/CREAT SERPL: 14 (ref 10–24)
CALCIUM SERPL-MCNC: 10.4 MG/DL (ref 8.6–10.2)
CHLORIDE SERPL-SCNC: 100 MMOL/L (ref 96–106)
CO2 SERPL-SCNC: 25 MMOL/L (ref 20–29)
CREAT SERPL-MCNC: 1.14 MG/DL (ref 0.76–1.27)
EGFRCR SERPLBLD CKD-EPI 2021: 67 ML/MIN/1.73
GLUCOSE SERPL-MCNC: 116 MG/DL (ref 70–99)
POTASSIUM SERPL-SCNC: 5.4 MMOL/L (ref 3.5–5.2)
PTH-INTACT SERPL-MCNC: 16 PG/ML (ref 15–65)
SODIUM SERPL-SCNC: 141 MMOL/L (ref 134–144)

## 2022-10-14 RX ORDER — DAPAGLIFLOZIN 5 MG/1
5 TABLET, FILM COATED ORAL DAILY
Qty: 90 TABLET | Refills: 1 | Status: SHIPPED | OUTPATIENT
Start: 2022-10-14 | End: 2022-12-15

## 2022-10-16 DIAGNOSIS — E11.42 TYPE 2 DIABETES MELLITUS WITH PERIPHERAL NEUROPATHY: ICD-10-CM

## 2022-10-16 RX ORDER — GABAPENTIN 300 MG/1
600 CAPSULE ORAL 4 TIMES DAILY
Qty: 240 CAPSULE | Refills: 2 | Status: SHIPPED | OUTPATIENT
Start: 2022-10-16 | End: 2022-11-08 | Stop reason: SDUPTHER

## 2022-10-19 DIAGNOSIS — E11.42 TYPE 2 DIABETES MELLITUS WITH PERIPHERAL NEUROPATHY: Primary | ICD-10-CM

## 2022-10-25 RX ORDER — INSULIN GLARGINE 300 U/ML
INJECTION, SOLUTION SUBCUTANEOUS
Qty: 13.5 ML | Refills: 0 | Status: SHIPPED | OUTPATIENT
Start: 2022-10-25 | End: 2022-12-15

## 2022-10-25 NOTE — TELEPHONE ENCOUNTER
Rx Refill Note  Requested Prescriptions     Pending Prescriptions Disp Refills    Toujeo SoloStar 300 UNIT/ML solution pen-injector injection [Pharmacy Med Name: TOUJEO SOLOSTAR PEN 1.5ML 3'S 300U/ML] 13.5 mL 2     Sig: INJECT 34 UNITS UNDER THE SKIN INTO THE APPROPRIATE AREA AS DIRECTED DAILY      Last office visit with prescribing clinician: 10/11/2022      Next office visit with prescribing clinician: Visit date not found            Cris Torres MA  10/25/22, 10:55 EDT

## 2022-11-08 DIAGNOSIS — E11.42 TYPE 2 DIABETES MELLITUS WITH PERIPHERAL NEUROPATHY: ICD-10-CM

## 2022-11-08 RX ORDER — LEVOTHYROXINE SODIUM 0.05 MG/1
TABLET ORAL
Qty: 90 TABLET | Refills: 3 | Status: SHIPPED | OUTPATIENT
Start: 2022-11-08

## 2022-11-08 RX ORDER — GABAPENTIN 300 MG/1
600 CAPSULE ORAL 4 TIMES DAILY
Qty: 240 CAPSULE | Refills: 2 | Status: SHIPPED | OUTPATIENT
Start: 2022-11-08 | End: 2023-01-05 | Stop reason: SDUPTHER

## 2022-11-18 ENCOUNTER — LAB (OUTPATIENT)
Dept: ENDOCRINOLOGY | Age: 72
End: 2022-11-18

## 2022-11-18 DIAGNOSIS — E03.9 ACQUIRED HYPOTHYROIDISM: ICD-10-CM

## 2022-11-18 DIAGNOSIS — E83.52 SERUM CALCIUM ELEVATED: ICD-10-CM

## 2022-11-18 DIAGNOSIS — E78.2 MIXED HYPERLIPIDEMIA: ICD-10-CM

## 2022-11-18 DIAGNOSIS — E11.42 TYPE 2 DIABETES MELLITUS WITH PERIPHERAL NEUROPATHY: ICD-10-CM

## 2022-11-19 LAB
25(OH)D3+25(OH)D2 SERPL-MCNC: 70.9 NG/ML (ref 30–100)
ALBUMIN SERPL-MCNC: 4.4 G/DL (ref 3.5–5.2)
ALBUMIN/CREAT UR: 88 MG/G CREAT (ref 0–29)
ALBUMIN/GLOB SERPL: 1.8 G/DL
ALP SERPL-CCNC: 63 U/L (ref 39–117)
ALT SERPL-CCNC: 26 U/L (ref 1–41)
AST SERPL-CCNC: 22 U/L (ref 1–40)
BILIRUB SERPL-MCNC: 0.7 MG/DL (ref 0–1.2)
BUN SERPL-MCNC: 12 MG/DL (ref 8–23)
BUN/CREAT SERPL: 11.7 (ref 7–25)
CALCIUM SERPL-MCNC: 10.3 MG/DL (ref 8.6–10.5)
CHLORIDE SERPL-SCNC: 102 MMOL/L (ref 98–107)
CHOLEST SERPL-MCNC: 117 MG/DL (ref 0–200)
CO2 SERPL-SCNC: 29.2 MMOL/L (ref 22–29)
CREAT SERPL-MCNC: 1.03 MG/DL (ref 0.76–1.27)
CREAT UR-MCNC: 100.3 MG/DL
EGFRCR SERPLBLD CKD-EPI 2021: 76.2 ML/MIN/1.73
GLOBULIN SER CALC-MCNC: 2.5 GM/DL
GLUCOSE SERPL-MCNC: 118 MG/DL (ref 65–99)
HBA1C MFR BLD: 7.5 % (ref 4.8–5.6)
HDLC SERPL-MCNC: 36 MG/DL (ref 40–60)
IMP & REVIEW OF LAB RESULTS: NORMAL
LDLC SERPL CALC-MCNC: 54 MG/DL (ref 0–100)
MICROALBUMIN UR-MCNC: 88.3 UG/ML
POTASSIUM SERPL-SCNC: 5 MMOL/L (ref 3.5–5.2)
PROT SERPL-MCNC: 6.9 G/DL (ref 6–8.5)
PTH-INTACT SERPL-MCNC: 20 PG/ML (ref 15–65)
SODIUM SERPL-SCNC: 141 MMOL/L (ref 136–145)
T3FREE SERPL-MCNC: 2.9 PG/ML (ref 2–4.4)
T4 FREE SERPL-MCNC: 1.13 NG/DL (ref 0.93–1.7)
TRIGL SERPL-MCNC: 157 MG/DL (ref 0–150)
TSH SERPL DL<=0.005 MIU/L-ACNC: 0.81 UIU/ML (ref 0.27–4.2)
VLDLC SERPL CALC-MCNC: 27 MG/DL (ref 5–40)

## 2022-12-01 ENCOUNTER — TELEPHONE (OUTPATIENT)
Dept: ENDOCRINOLOGY | Age: 72
End: 2022-12-01

## 2022-12-05 ENCOUNTER — OFFICE VISIT (OUTPATIENT)
Dept: FAMILY MEDICINE CLINIC | Facility: CLINIC | Age: 72
End: 2022-12-05

## 2022-12-05 ENCOUNTER — TELEPHONE (OUTPATIENT)
Dept: ENDOCRINOLOGY | Age: 72
End: 2022-12-05

## 2022-12-05 VITALS
TEMPERATURE: 97.5 F | WEIGHT: 198 LBS | HEART RATE: 59 BPM | RESPIRATION RATE: 18 BRPM | OXYGEN SATURATION: 96 % | BODY MASS INDEX: 28.35 KG/M2 | DIASTOLIC BLOOD PRESSURE: 72 MMHG | SYSTOLIC BLOOD PRESSURE: 124 MMHG | HEIGHT: 70 IN

## 2022-12-05 DIAGNOSIS — E11.42 TYPE 2 DIABETES MELLITUS WITH DIABETIC POLYNEUROPATHY, WITH LONG-TERM CURRENT USE OF INSULIN: Primary | ICD-10-CM

## 2022-12-05 DIAGNOSIS — Z79.4 TYPE 2 DIABETES MELLITUS WITH DIABETIC POLYNEUROPATHY, WITH LONG-TERM CURRENT USE OF INSULIN: Primary | ICD-10-CM

## 2022-12-05 DIAGNOSIS — E03.9 ACQUIRED HYPOTHYROIDISM: ICD-10-CM

## 2022-12-05 PROCEDURE — 99214 OFFICE O/P EST MOD 30 MIN: CPT | Performed by: FAMILY MEDICINE

## 2022-12-05 NOTE — ASSESSMENT & PLAN NOTE
Well controlled.  Blood work was within normal limits.  Patient is being managed by endocrinology.  Blood work within normal limits.

## 2022-12-05 NOTE — PROGRESS NOTES
"Chief Complaint  Chief Complaint   Patient presents with   • Establish Care     New Pt    • Diabetes     Pt has hx of Diabetes     Patient presents to establish care.     Subjective    History of Present Illness        Andrea Fam presents to [facility] for   History of Present Illness  Establish care   He denies concerns during his visit in clinic on 12/05/2022. The patient reports he uses a CPAP at night, and he reports this has helped him not become sick and improved his lung function. The patient notes his wife schedules his Medicare annual wellness visit normally during the summer. He reports completing yearly eye examinations.     Type 2 diabetes mellitus   He endorses following with endocrinology for his history of diabetes mellitus. The patient's last A1c was 7.5 percent.    History of hypothyroidism  He denies experiencing side effects from hypothyroidism; however, he notes taking medication secondary to previous laboratory study results regarding his thyroid panel.   Diabetes         Objective   Vital Signs:   Visit Vitals  /72   Pulse 59   Temp 97.5 °F (36.4 °C)   Resp 18   Ht 176.5 cm (69.5\")   Wt 89.8 kg (198 lb)   SpO2 96%   BMI 28.82 kg/m²          Physical Exam  Vitals reviewed.   Constitutional:       Appearance: He is well-developed.   HENT:      Head: Normocephalic and atraumatic.      Right Ear: External ear normal.      Left Ear: External ear normal.      Nose: Nose normal.   Eyes:      General: Lids are normal.      Conjunctiva/sclera: Conjunctivae normal.      Pupils: Pupils are equal, round, and reactive to light.   Cardiovascular:      Rate and Rhythm: Normal rate and regular rhythm.      Pulses: Normal pulses.      Heart sounds: Normal heart sounds.   Pulmonary:      Effort: Pulmonary effort is normal. No respiratory distress.      Breath sounds: Normal breath sounds.   Abdominal:      General: Bowel sounds are normal.      Palpations: Abdomen is soft. "   Musculoskeletal:         General: Normal range of motion.      Cervical back: Normal range of motion and neck supple.   Skin:     General: Skin is warm and dry.      Capillary Refill: Capillary refill takes less than 2 seconds.   Neurological:      Mental Status: He is alert and oriented to person, place, and time.   Psychiatric:         Behavior: Behavior normal.         Thought Content: Thought content normal.         Judgment: Judgment normal.            Result Review :                      Assessment and Plan      Diagnoses and all orders for this visit:    1. Type 2 diabetes mellitus with diabetic polyneuropathy, with long-term current use of insulin (HCC) (Primary)  Assessment & Plan:  Well controlled.  This is being managed by endocrinology.  Patient's last A1c was 7.9.      2. Acquired hypothyroidism  Assessment & Plan:  Well controlled.  Blood work was within normal limits.  Patient is being managed by endocrinology.  Blood work within normal limits.             Follow Up   No follow-ups on file.  Patient was given instructions and counseling regarding his condition or for health maintenance advice. Please see specific information pulled into the AVS if appropriate.     Transcribed from ambient dictation for Suleiman Byrnes Sr, MD by Nj Velasquez.  12/05/22   14:39 EST    Patient or patient representative verbalized consent to the visit recording.  I have personally performed the services described in this document as transcribed by the above individual, and it is both accurate and complete.

## 2022-12-15 ENCOUNTER — OFFICE VISIT (OUTPATIENT)
Dept: ENDOCRINOLOGY | Age: 72
End: 2022-12-15

## 2022-12-15 VITALS
WEIGHT: 201.8 LBS | TEMPERATURE: 96.8 F | BODY MASS INDEX: 29.89 KG/M2 | SYSTOLIC BLOOD PRESSURE: 126 MMHG | HEIGHT: 69 IN | DIASTOLIC BLOOD PRESSURE: 60 MMHG | OXYGEN SATURATION: 98 % | HEART RATE: 80 BPM

## 2022-12-15 DIAGNOSIS — E11.42 TYPE 2 DIABETES MELLITUS WITH PERIPHERAL NEUROPATHY: Primary | ICD-10-CM

## 2022-12-15 DIAGNOSIS — E11.69 HYPERLIPIDEMIA ASSOCIATED WITH TYPE 2 DIABETES MELLITUS: ICD-10-CM

## 2022-12-15 DIAGNOSIS — E78.5 HYPERLIPIDEMIA ASSOCIATED WITH TYPE 2 DIABETES MELLITUS: ICD-10-CM

## 2022-12-15 DIAGNOSIS — E55.9 VITAMIN D DEFICIENCY: ICD-10-CM

## 2022-12-15 DIAGNOSIS — E03.9 ACQUIRED HYPOTHYROIDISM: ICD-10-CM

## 2022-12-15 PROCEDURE — 99214 OFFICE O/P EST MOD 30 MIN: CPT | Performed by: INTERNAL MEDICINE

## 2022-12-15 RX ORDER — INSULIN GLARGINE 300 U/ML
INJECTION, SOLUTION SUBCUTANEOUS
Qty: 13.5 ML | Refills: 2 | Status: SHIPPED | OUTPATIENT
Start: 2022-12-15

## 2022-12-15 RX ORDER — PEN NEEDLE, DIABETIC 32GX 5/32"
NEEDLE, DISPOSABLE MISCELLANEOUS
COMMUNITY
Start: 2022-10-14

## 2023-01-05 DIAGNOSIS — E11.42 TYPE 2 DIABETES MELLITUS WITH PERIPHERAL NEUROPATHY: ICD-10-CM

## 2023-01-05 RX ORDER — GABAPENTIN 300 MG/1
600 CAPSULE ORAL 4 TIMES DAILY
Qty: 240 CAPSULE | Refills: 0 | Status: SHIPPED | OUTPATIENT
Start: 2023-01-05 | End: 2023-02-28

## 2023-02-06 ENCOUNTER — TELEPHONE (OUTPATIENT)
Dept: ENDOCRINOLOGY | Age: 73
End: 2023-02-06

## 2023-02-06 NOTE — TELEPHONE ENCOUNTER
PT WANTS TO TALK TO RA, STATES HIS GABAPENTIN DOSE IS OFF. PT STATES IT SHOULD BE 9 PILLS A DAY INSTEAD OF 8 PILLS A DAY. PT WOULD LIKE TO TALK ABOUT THE CHANGE IN HIS RX.      LAURIE PENA   [phone]

## 2023-02-17 ENCOUNTER — TELEPHONE (OUTPATIENT)
Dept: FAMILY MEDICINE CLINIC | Facility: CLINIC | Age: 73
End: 2023-02-17
Payer: MEDICARE

## 2023-02-17 DIAGNOSIS — E11.42 TYPE 2 DIABETES MELLITUS WITH DIABETIC POLYNEUROPATHY, WITH LONG-TERM CURRENT USE OF INSULIN: Primary | ICD-10-CM

## 2023-02-17 DIAGNOSIS — Z79.4 TYPE 2 DIABETES MELLITUS WITH DIABETIC POLYNEUROPATHY, WITH LONG-TERM CURRENT USE OF INSULIN: Primary | ICD-10-CM

## 2023-02-28 DIAGNOSIS — E11.42 TYPE 2 DIABETES MELLITUS WITH PERIPHERAL NEUROPATHY: ICD-10-CM

## 2023-02-28 RX ORDER — GABAPENTIN 300 MG/1
CAPSULE ORAL
Qty: 240 CAPSULE | Refills: 0 | Status: SHIPPED | OUTPATIENT
Start: 2023-02-28 | End: 2023-04-06

## 2023-04-05 DIAGNOSIS — E11.42 TYPE 2 DIABETES MELLITUS WITH PERIPHERAL NEUROPATHY: ICD-10-CM

## 2023-04-06 RX ORDER — GABAPENTIN 300 MG/1
CAPSULE ORAL
Qty: 240 CAPSULE | Refills: 0 | Status: SHIPPED | OUTPATIENT
Start: 2023-04-06

## 2023-04-12 ENCOUNTER — HOSPITAL ENCOUNTER (OUTPATIENT)
Dept: GENERAL RADIOLOGY | Facility: HOSPITAL | Age: 73
Discharge: HOME | End: 2023-04-12
Admitting: FAMILY MEDICINE
Payer: MEDICARE

## 2023-04-12 ENCOUNTER — OFFICE VISIT (OUTPATIENT)
Dept: FAMILY MEDICINE CLINIC | Facility: CLINIC | Age: 73
End: 2023-04-12
Payer: MEDICARE

## 2023-04-12 VITALS
RESPIRATION RATE: 20 BRPM | HEIGHT: 69 IN | HEART RATE: 67 BPM | TEMPERATURE: 97.3 F | DIASTOLIC BLOOD PRESSURE: 62 MMHG | WEIGHT: 200 LBS | SYSTOLIC BLOOD PRESSURE: 108 MMHG | BODY MASS INDEX: 29.62 KG/M2 | OXYGEN SATURATION: 95 %

## 2023-04-12 DIAGNOSIS — M25.562 ACUTE PAIN OF LEFT KNEE: Primary | ICD-10-CM

## 2023-04-12 PROCEDURE — 73564 X-RAY EXAM KNEE 4 OR MORE: CPT

## 2023-04-12 PROCEDURE — 99214 OFFICE O/P EST MOD 30 MIN: CPT | Performed by: FAMILY MEDICINE

## 2023-04-12 RX ORDER — PREDNISONE 20 MG/1
40 TABLET ORAL DAILY
Qty: 10 TABLET | Refills: 0 | Status: SHIPPED | OUTPATIENT
Start: 2023-04-12 | End: 2023-04-17

## 2023-04-12 NOTE — PROGRESS NOTES
"Chief Complaint  Chief Complaint   Patient presents with   • Knee Pain     Pt c/o L knee pain x 4 days        Subjective    History of Present Illness        Andrea Fam presents to [facility] for   Knee Pain   The incident occurred 5 to 7 days ago. There was no injury mechanism (just felt a sudden snap in the left knee). The pain is present in the left knee. The pain is at a severity of 6/10. The pain is moderate. Pain course: hurts when he walks.  It hurts all over the knee. Pertinent negatives include no numbness or tingling. He reports no foreign bodies present. The symptoms are aggravated by movement. He has tried ice, elevation and NSAIDs for the symptoms. The treatment provided mild relief.        Objective   Vital Signs:   Visit Vitals  /62   Pulse 67   Temp 97.3 °F (36.3 °C)   Resp 20   Ht 176.5 cm (69.49\")   Wt 90.7 kg (200 lb)   SpO2 95%   BMI 29.12 kg/m²          Physical Exam  Vitals reviewed.   Constitutional:       Appearance: He is well-developed.   HENT:      Head: Normocephalic.      Right Ear: External ear normal.      Left Ear: External ear normal.      Nose: Nose normal.   Eyes:      Conjunctiva/sclera: Conjunctivae normal.   Cardiovascular:      Rate and Rhythm: Normal rate and regular rhythm.      Heart sounds: Normal heart sounds.   Pulmonary:      Effort: Pulmonary effort is normal.      Breath sounds: Normal breath sounds.   Musculoskeletal:      Cervical back: Normal range of motion.      Left knee: Tenderness present over the lateral joint line.   Skin:     General: Skin is warm and dry.      Capillary Refill: Capillary refill takes less than 2 seconds.   Neurological:      Mental Status: He is alert and oriented to person, place, and time.            Result Review :                    Assessment and Plan      Diagnoses and all orders for this visit:    1. Acute pain of left knee (Primary)  Assessment & Plan:  X-ray ordered of left knee.  Patient " started on prednisone for 5 days.  If symptoms do not improve consider Ortho referral.    Orders:  -     XR Knee 4+ View Left  -     predniSONE (DELTASONE) 20 MG tablet; Take 2 tablets by mouth Daily for 5 days.  Dispense: 10 tablet; Refill: 0           Follow Up   No follow-ups on file.  Patient was given instructions and counseling regarding his condition or for health maintenance advice. Please see specific information pulled into the AVS if appropriate.

## 2023-04-12 NOTE — ASSESSMENT & PLAN NOTE
X-ray ordered of left knee.  Patient started on prednisone for 5 days.  If symptoms do not improve consider Ortho referral.

## 2023-04-20 ENCOUNTER — OFFICE VISIT (OUTPATIENT)
Dept: ENDOCRINOLOGY | Age: 73
End: 2023-04-20
Payer: MEDICARE

## 2023-04-20 VITALS
TEMPERATURE: 97.5 F | SYSTOLIC BLOOD PRESSURE: 116 MMHG | WEIGHT: 196.2 LBS | HEART RATE: 70 BPM | BODY MASS INDEX: 29.06 KG/M2 | DIASTOLIC BLOOD PRESSURE: 70 MMHG | HEIGHT: 69 IN | OXYGEN SATURATION: 95 %

## 2023-04-20 DIAGNOSIS — E11.42 TYPE 2 DIABETES MELLITUS WITH PERIPHERAL NEUROPATHY: Primary | ICD-10-CM

## 2023-04-20 DIAGNOSIS — E11.69 HYPERLIPIDEMIA ASSOCIATED WITH TYPE 2 DIABETES MELLITUS: ICD-10-CM

## 2023-04-20 DIAGNOSIS — E03.9 ACQUIRED HYPOTHYROIDISM: ICD-10-CM

## 2023-04-20 DIAGNOSIS — E78.5 HYPERLIPIDEMIA ASSOCIATED WITH TYPE 2 DIABETES MELLITUS: ICD-10-CM

## 2023-04-20 DIAGNOSIS — E55.9 VITAMIN D DEFICIENCY: ICD-10-CM

## 2023-04-20 PROCEDURE — 3046F HEMOGLOBIN A1C LEVEL >9.0%: CPT | Performed by: NURSE PRACTITIONER

## 2023-04-20 PROCEDURE — 99214 OFFICE O/P EST MOD 30 MIN: CPT | Performed by: NURSE PRACTITIONER

## 2023-04-20 RX ORDER — INSULIN GLARGINE 300 U/ML
36 INJECTION, SOLUTION SUBCUTANEOUS DAILY
Qty: 10.8 ML | Refills: 1 | Status: SHIPPED | OUTPATIENT
Start: 2023-04-20 | End: 2023-07-19

## 2023-04-20 RX ORDER — BLOOD-GLUCOSE METER
KIT MISCELLANEOUS
Qty: 1 EACH | Refills: 0 | Status: SHIPPED | OUTPATIENT
Start: 2023-04-20

## 2023-04-20 RX ORDER — DULAGLUTIDE 3 MG/.5ML
3 INJECTION, SOLUTION SUBCUTANEOUS WEEKLY
Qty: 6 ML | Refills: 1 | Status: SHIPPED | OUTPATIENT
Start: 2023-04-20

## 2023-04-20 RX ORDER — LANCETS 30 GAUGE
EACH MISCELLANEOUS
Qty: 300 EACH | Refills: 4 | Status: SHIPPED | OUTPATIENT
Start: 2023-04-20

## 2023-04-20 RX ORDER — ATORVASTATIN CALCIUM 20 MG/1
20 TABLET, FILM COATED ORAL NIGHTLY
Qty: 90 TABLET | Refills: 1 | Status: SHIPPED | OUTPATIENT
Start: 2023-04-20

## 2023-04-20 NOTE — PROGRESS NOTES
"Chief Complaint  Type 2 diabetes mellitus with peripheral neuropathy (Pt doesn't have a meter with him today and states that he doesn't check his bs, he has no hx of retinopathy with a severe case of neuropathy in his feet and hands )    Subjective        Andrea Fam presents to [facility]  History of Present Illness     Off farxiga r/t increased urination    Does not check BS    Was on vacation for 2 weeks     Type 2 dm    Diagnosed about 10 + years ago.   Today in clinic pt reports being on trulicity 1.5 mg subq once a week, Toujeo 28u ( did not increase dose after last visit)  Last eye exam - 9/2022  Dm nephropathy - denies   Dm neuropathy -  Yes, on gabapentin 600mg QID   CAD - denies   CVA - denies   Episodes of hypoglycemia - denies      Hypothyroidism   on levothyroxine 50 mcg oral daily  Lab Results   Component Value Date    TSH 0.878 04/13/2023       Objective   Vital Signs:  /70   Pulse 70   Temp 97.5 °F (36.4 °C) (Temporal)   Ht 175.3 cm (69\")   Wt 89 kg (196 lb 3.2 oz)   SpO2 95%   BMI 28.97 kg/m²   Estimated body mass index is 28.97 kg/m² as calculated from the following:    Height as of this encounter: 175.3 cm (69\").    Weight as of this encounter: 89 kg (196 lb 3.2 oz).             Physical Exam  Vitals reviewed.   Constitutional:       General: He is not in acute distress.  HENT:      Head: Normocephalic and atraumatic.   Cardiovascular:      Rate and Rhythm: Normal rate.   Pulmonary:      Effort: Pulmonary effort is normal. No respiratory distress.   Musculoskeletal:         General: No signs of injury. Normal range of motion.      Cervical back: Normal range of motion and neck supple.   Skin:     General: Skin is warm and dry.   Neurological:      Mental Status: He is alert and oriented to person, place, and time. Mental status is at baseline.   Psychiatric:         Mood and Affect: Mood normal.         Behavior: Behavior normal.         Thought " Content: Thought content normal.         Judgment: Judgment normal.          Result Review :  The following data was reviewed by: NATALIE Hernandez on 04/20/2023:  Common labs        10/11/2022    11:28 11/18/2022    11:30 4/13/2023    10:06   Common Labs   Glucose 116   118   240     BUN 16   12   13     Creatinine 1.14   1.03   1.13     Sodium 141   141   140     Potassium 5.4   5.0   5.1     Chloride 100   102   100     Calcium 10.4   10.3   10.8     Total Protein  6.9      Albumin  4.40      Total Bilirubin  0.7      Alkaline Phosphatase  63      AST (SGOT)  22      ALT (SGPT)  26      Total Cholesterol  117   112     Triglycerides  157   178     HDL Cholesterol  36   30     LDL Cholesterol   54   52     Hemoglobin A1C  7.50   10.20     Microalbumin, Urine  88.3                   Assessment and Plan   Diagnoses and all orders for this visit:    1. Type 2 diabetes mellitus with peripheral neuropathy (Primary)  -     Hemoglobin A1c; Future  -     Basic Metabolic Panel; Future  -     TSH; Future    2. Acquired hypothyroidism  -     Hemoglobin A1c; Future  -     Basic Metabolic Panel; Future  -     TSH; Future    3. Hyperlipidemia associated with type 2 diabetes mellitus  -     Hemoglobin A1c; Future  -     Basic Metabolic Panel; Future  -     TSH; Future    4. Vitamin D deficiency  -     Hemoglobin A1c; Future  -     Basic Metabolic Panel; Future  -     TSH; Future  -     Vitamin D,25-Hydroxy; Future  -     PTH, Intact; Future    Other orders  -     atorvastatin (LIPITOR) 20 MG tablet; Take 1 tablet by mouth Every Night.  Dispense: 90 tablet; Refill: 1  -     Insulin Glargine, 1 Unit Dial, (Toujeo SoloStar) 300 UNIT/ML solution pen-injector injection; Inject 36 Units under the skin into the appropriate area as directed Daily for 90 days. Take 34 units daily  Dispense: 10.8 mL; Refill: 1  -     metFORMIN (GLUCOPHAGE) 500 MG tablet; Take 2 tablets by mouth 2 (Two) Times a Day With Meals.  Dispense: 360 tablet;  Refill: 1  -     Dulaglutide (Trulicity) 3 MG/0.5ML solution pen-injector; Inject 0.5 mL under the skin into the appropriate area as directed 1 (One) Time Per Week.  Dispense: 6 mL; Refill: 1  -     Lancets misc; Dispense based on insurance preference: Check 4 times a day. Dx: E 11.9  Dispense: 300 each; Refill: 4  -     glucose monitor monitoring kit; Dispense one kit based on insurance preference and related supplies to check 4 times a day. Dx: E 11.9  Dispense: 1 each; Refill: 0  -     glucose blood test strip; Dispense based on insurance preference: Check 3 times a day Dx: E 11.9  Dispense: 300 each; Refill: 4             Follow Up   No follow-ups on file.     High risk of complications given a1c worsening, educated patient on need to improve glycemic control to reduce risk   Stop vitamin d r/t increased calcium levels   Increase toujeo to 36u daily and increase trulicity to 3mg wekly   Resume BGM:  Goal BS:  Morning before eating < 130  2 hours after eating < 160  Before bed < 150    Thyroid labs stable     Patient was given instructions and counseling regarding his condition or for health maintenance advice. Please see specific information pulled into the AVS if appropriate.     NATALIE Hernandez

## 2023-04-20 NOTE — PATIENT INSTRUCTIONS
Stop vitamin d    Goal BS:  Morning before eating < 130  2 hours after eating < 160  Before bed < 150

## 2023-05-15 DIAGNOSIS — E11.42 TYPE 2 DIABETES MELLITUS WITH PERIPHERAL NEUROPATHY: ICD-10-CM

## 2023-05-15 NOTE — TELEPHONE ENCOUNTER
Caller: SarwatAndrea    Relationship: Self    Best call back number: [phone]    Requested Prescriptions:   Requested Prescriptions     Pending Prescriptions Disp Refills   • gabapentin (NEURONTIN) 300 MG capsule 240 capsule 0        Pharmacy where request should be sent: [facility] [address] [place] [address] [facility] [phone] [facility] [phone]      Last office visit with prescribing clinician: 4/12/2023   Last telemedicine visit with prescribing clinician: 4/12/2023   Next office visit with prescribing clinician: 8/14/2023     Additional details provided by patient: PATIENT STATES HE HAS 2 DAYS LEFT ON THIS PRESCRIPTION.    PATIENT STATES HE NEEDS A  WEEK WORTH SENT IN TO ABOVE PHARMACY WHILE WAITING ON [facility].     Does the patient have less than a 3 day supply:  [x] Yes  [] No    Would you like a call back once the refill request has been completed: [] Yes [] No    If the office needs to give you a call back, can they leave a voicemail: [] Yes [] No    Phoebe Burt Rep   05/15/23 15:10 EDT

## 2023-05-16 ENCOUNTER — TELEPHONE (OUTPATIENT)
Dept: FAMILY MEDICINE CLINIC | Facility: CLINIC | Age: 73
End: 2023-05-16

## 2023-05-16 NOTE — TELEPHONE ENCOUNTER
Rx Refill Note  Requested Prescriptions     Pending Prescriptions Disp Refills   • gabapentin (NEURONTIN) 300 MG capsule 240 capsule 0      Last office visit with prescribing clinician: 4/12/2023   Last telemedicine visit with prescribing clinician: 4/12/2023   Next office visit with prescribing clinician: 5/16/2023                         Would you like a call back once the refill request has been completed: [] Yes [] No    If the office needs to give you a call back, can they leave a voicemail: [] Yes [] No    Hiral Forde MA  05/16/23, 12:47 EDT

## 2023-05-16 NOTE — TELEPHONE ENCOUNTER
Caller: Andrea Fam    Relationship: Self    Best call back number: [phone]    What was the call regarding: PATIENT STATES [facility] - [place], [place] - [address] [place] [address] - [phone] [facility] [phone] [phone] HAS BEEN SENDING REFILL REQUESTS FOR gabapentin (NEURONTIN) 300 MG capsule BUT HAS NOT HEARD ANYTHING BACK YET. PATIENT STATES HE HAS 1 DAY LEFT ON THIS PRESCRIPTION AND PUT A PARTIAL REFILL REQUEST IN FOR A WEEK THROUGH [facility] [address] [place] [address] ([place]), [place] - [address] - [phone] [facility] [phone]   [phone].    PATIENT IS NEEDING AN UPDATE.

## 2023-05-16 NOTE — TELEPHONE ENCOUNTER
Caller: Andrea Fam    Relationship: Self    Best call back number: [phone]    What is the best time to reach you: ANYTIME     Who are you requesting to speak with (clinical staff, provider,  specific staff member): CLINICAL     What was the call regarding: PATIENT CALLING TO CHECK ON STATUS OF THIS REQUEST. PATIENT STATES HE HAS 1 DAY LEFT ON THIS PRESCRIPTION.     Do you require a callback: YES

## 2023-05-17 DIAGNOSIS — E11.42 TYPE 2 DIABETES MELLITUS WITH PERIPHERAL NEUROPATHY: ICD-10-CM

## 2023-05-17 RX ORDER — GABAPENTIN 300 MG/1
600 CAPSULE ORAL 4 TIMES DAILY
Qty: 240 CAPSULE | Refills: 0 | Status: SHIPPED | OUTPATIENT
Start: 2023-05-17 | End: 2023-05-17 | Stop reason: SDUPTHER

## 2023-05-17 RX ORDER — GABAPENTIN 300 MG/1
600 CAPSULE ORAL 4 TIMES DAILY
Qty: 56 CAPSULE | Refills: 0 | Status: SHIPPED | OUTPATIENT
Start: 2023-05-17 | End: 2023-05-19 | Stop reason: SDUPTHER

## 2023-05-17 NOTE — TELEPHONE ENCOUNTER
Caller: Andrea Fam    Relationship: Self    Best call back number: [phone]    What was the call regarding: PATIENT STATES HE HAD THE WRONG DR PEDERSEN. PATIENT STATES IT IS OTHER PROVIDER SO DR PEDERSEN DOES NOT HAVE TO WORRY ABOUT THIS MEDICATION . HE STATES HE IS SORRY ABOUT THE CONFUSION     PLEASE ADVISE

## 2023-05-17 NOTE — TELEPHONE ENCOUNTER
PATIENT IS OUT OF MEDICATION AS OF TODAY. WOULD LIKE TO KNOW IF DR PEDERSEN IS ABLE TO CALL IN 7 DAY SUPPLY OF GABAPENTIN TO [facility] ON [address]. PLEASE CALL TO ADVISE

## 2023-05-18 ENCOUNTER — TELEPHONE (OUTPATIENT)
Dept: ENDOCRINOLOGY | Age: 73
End: 2023-05-18

## 2023-05-18 NOTE — TELEPHONE ENCOUNTER
Caller: Andrea Fam    Relationship: Self    Best call back number: [phone]    Who are you requesting to speak with (clinical staff, provider,  specific staff member): ASSISTANT TO RA ASHFORD    Do you require a callback: YES. PATIENT RETURNED CALL FROM RA ASHFORD'S ASSITANT AND IS UNSURE WHAT THE CALL WAS IN REGARDS TO. HE WOULD LIKE A CALL BACK.

## 2023-05-19 DIAGNOSIS — E11.42 TYPE 2 DIABETES MELLITUS WITH PERIPHERAL NEUROPATHY: ICD-10-CM

## 2023-05-19 RX ORDER — GABAPENTIN 300 MG/1
600 CAPSULE ORAL 4 TIMES DAILY
Qty: 720 CAPSULE | Refills: 0 | Status: SHIPPED | OUTPATIENT
Start: 2023-05-19

## 2023-08-14 ENCOUNTER — OFFICE VISIT (OUTPATIENT)
Dept: FAMILY MEDICINE CLINIC | Facility: CLINIC | Age: 73
End: 2023-08-14
Payer: MEDICARE

## 2023-08-14 VITALS
TEMPERATURE: 97.7 F | WEIGHT: 196 LBS | SYSTOLIC BLOOD PRESSURE: 120 MMHG | HEART RATE: 64 BPM | HEIGHT: 68 IN | OXYGEN SATURATION: 96 % | DIASTOLIC BLOOD PRESSURE: 78 MMHG | BODY MASS INDEX: 29.7 KG/M2

## 2023-08-14 DIAGNOSIS — M51.36 DDD (DEGENERATIVE DISC DISEASE), LUMBAR: ICD-10-CM

## 2023-08-14 DIAGNOSIS — Z00.00 MEDICARE ANNUAL WELLNESS VISIT, SUBSEQUENT: Primary | ICD-10-CM

## 2023-08-14 DIAGNOSIS — R35.1 NOCTURIA: ICD-10-CM

## 2023-08-14 PROCEDURE — 1159F MED LIST DOCD IN RCRD: CPT | Performed by: FAMILY MEDICINE

## 2023-08-14 PROCEDURE — 1170F FXNL STATUS ASSESSED: CPT | Performed by: FAMILY MEDICINE

## 2023-08-14 PROCEDURE — 3052F HG A1C>EQUAL 8.0%<EQUAL 9.0%: CPT | Performed by: FAMILY MEDICINE

## 2023-08-14 PROCEDURE — 1160F RVW MEDS BY RX/DR IN RCRD: CPT | Performed by: FAMILY MEDICINE

## 2023-08-14 PROCEDURE — G0439 PPPS, SUBSEQ VISIT: HCPCS | Performed by: FAMILY MEDICINE

## 2023-08-14 RX ORDER — TAMSULOSIN HYDROCHLORIDE 0.4 MG/1
1 CAPSULE ORAL DAILY
Qty: 90 CAPSULE | Refills: 2 | Status: SHIPPED | OUTPATIENT
Start: 2023-08-14

## 2023-08-14 RX ORDER — DIFLUNISAL 500 MG/1
500 TABLET, FILM COATED ORAL 2 TIMES DAILY
Qty: 180 TABLET | Refills: 1 | Status: SHIPPED | OUTPATIENT
Start: 2023-08-14

## 2023-08-14 NOTE — PROGRESS NOTES
"QUICK REFERENCE INFORMATION:  The ABCs of the Annual Wellness Visit    Subsequent Medicare Wellness Visit     HEALTH RISK ASSESSMENT    : 1950    Recent Hospitalizations:  Recently treated at the following:  [facility].  [facility]    Current Medical Providers:  Patient Care Team:  Suleiman Byrnes Sr., MD as PCP - General (Family Medicine)  Asya Henao MD as Consulting Physician (Endocrinology)  John Mcguire MD as Consulting Physician (Pulmonary Disease)  Tamia Carrillo MD as Consulting Physician (Ophthalmology)  Jamal Prince MD as Consulting Physician (Orthopedic Surgery)    Smoking Status:  Social History     Tobacco Use   Smoking Status Former    Packs/day: 1.50    Years: 30.00    Pack years: 45.00    Types: Cigarettes    Start date: 1968    Quit date: 1997    Years since quittin.6    Passive exposure: Never   Smokeless Tobacco Never   Tobacco Comments    Quit \"cold turkey\"       Alcohol Consumption:  Social History     Substance and Sexual Activity   Alcohol Use Yes    Alcohol/week: 1.0 standard drink    Types: 1 Drinks containing 0.5 oz of alcohol per week    Comment: These are occasional monthly #'s       Depression Screen:       2023     2:02 PM   PHQ-2/PHQ-9 Depression Screening   Little Interest or Pleasure in Doing Things 0-->not at all   Feeling Down, Depressed or Hopeless 0-->not at all   PHQ-9: Brief Depression Severity Measure Score 0       Health Habits and Functional and Cognitive Screenin/14/2023     2:00 PM   Functional & Cognitive Status   Do you have difficulty preparing food and eating? No   Do you have difficulty bathing yourself, getting dressed or grooming yourself? No   Do you have difficulty using the toilet? No   Do you have difficulty moving around from place to place? No   Do you have trouble with steps or getting out of a bed or a chair? No   Current Diet Unhealthy Diet   Dental Exam Up to date   Eye Exam Up to date " "  Exercise (times per week) 0 times per week   Do you need help using the phone?  No   Are you deaf or do you have serious difficulty hearing?  No   Do you need help to go to places out of walking distance? No   Do you need help shopping? No   Do you need help preparing meals?  No   Do you need help with housework?  No   Do you need help with laundry? No   Do you need help taking your medications? No   Do you need help managing money? No   Do you ever drive or ride in a car without wearing a seat belt? No   Have you felt unusual stress, anger or loneliness in the last month? No   Who do you live with? Spouse   If you need help, do you have trouble finding someone available to you? No   Do you have difficulty concentrating, remembering or making decisions? No       Does the patient have evidence of cognitive impairment? No    Mini-Mental State Examination (MMSE)        Instructions: Ask the questions in the order listed. Score one point for each correct response within each question or activity.      Maximum Score  Patient's Score  Questions    5   4 "What is the year?  Season?  Date?  Day of the week?  Month?"    5  5  "Where are we now: State?  County?  Town/city?  Hospital?  Floor?"    3  3  The examiner names three unrelated objects clearly and slowly, then asks the patient to name all three of them. The patient's response is used for scoring. The examiner repeats them until patient learns all of them, if possible. Number of trials: ___________    5  5  "I would like you to count backward from 100 by sevens." (93, 86, 79, 72, 65, .) Stop after five answers.   Alternative: "Spell WORLD backwards." (D-[id number]-W)    3  3  "Earlier I told you the names of three things. Can you tell me what those were?"    2   2 Show the patient two simple objects, such as a wristwatch and a pencil, and ask the patient to name them.    1  1  "Repeat the phrase: `No ifs, ands, or buts.'"    3  3  "Take the paper in your right hand, fold " "it in half, and put it on the floor."   (The examiner gives the patient a piece of blank paper.)    1  1  "Please read this and do what it says." (Written instruction is "Close your eyes.")    1   1 "Make up and write a sentence about anything." (This sentence must contain a noun and a verb.)    1  1  "Please copy this picture." (The examiner gives the patient a blank piece of paper and asks him/her to draw the symbol below. All 10 angles must be present and two must intersect.)             30   29 TOTAL          Aspirin use counseling:  Not taking any ASA    Recent Lab Results:    Lab Results   Component Value Date    GLU 96 07/09/2020     Lab Results   Component Value Date    HGBA1C 8.6 (H) 07/13/2023     Lab Results   Component Value Date    TRIG 178 (H) 04/13/2023    HDL 30 (L) 04/13/2023    VLDL 30 04/13/2023       Age-appropriate Screening Schedule:  Refer to the list below for future screening recommendations based on patient's age, sex and/or medical conditions. Orders for these recommended tests are listed in the plan section. The patient has been provided with a written plan.    Health Maintenance   Topic Date Due    ZOSTER VACCINE (2 of 2) 10/27/2017    DIABETIC FOOT EXAM  03/13/2018    Pneumococcal Vaccine 65+ (2 - PPSV23 or PCV20) 08/03/2018    COVID-19 Vaccine (3 - Pfizer series) 06/17/2021    DIABETIC EYE EXAM  02/22/2023    ANNUAL WELLNESS VISIT  05/16/2023    INFLUENZA VACCINE  10/01/2023    URINE MICROALBUMIN  11/18/2023    HEMOGLOBIN A1C  01/13/2024    LIPID PANEL  04/13/2024    COLORECTAL CANCER SCREENING  09/01/2027    TDAP/TD VACCINES (3 - Td or Tdap) 06/08/2028    AAA SCREEN (ONE-TIME)  Completed    HEPATITIS C SCREENING  Addressed        Subjective   History of Present Illness:  Andrea Fam is a 73 y.o. male who presents for an Annual Wellness Visit.  Compared to one year ago, the patient feels his physical health is the same.  Compared to one year ago, the patient feels his mental health is " "the same.  History of Present Illness     Allergies:  Allergies   Allergen Reactions    Lisinopril Other (See Comments)     Diplopia         Social History:  Social History     Socioeconomic History    Marital status:    Tobacco Use    Smoking status: Former     Packs/day: 1.50     Years: 30.00     Pack years: 45.00     Types: Cigarettes     Start date: 1968     Quit date: 1997     Years since quittin.6     Passive exposure: Never    Smokeless tobacco: Never    Tobacco comments:     Quit \"cold turkey\"   Vaping Use    Vaping Use: Never used   Substance and Sexual Activity    Alcohol use: Yes     Alcohol/week: 1.0 standard drink     Types: 1 Drinks containing 0.5 oz of alcohol per week     Comment: These are occasional monthly #'s    Drug use: No    Sexual activity: Not Currently     Partners: Female     Birth control/protection: None       Family History:  Family History   Problem Relation Age of Onset    Alzheimer's disease Mother     Lymphoma Paternal Grandmother     Cerebral aneurysm Paternal Grandfather     Colon cancer Neg Hx     Malig Hyperthermia Neg Hx        Past Medical History :  Active Ambulatory Problems     Diagnosis Date Noted    Rotator cuff insufficiency of right shoulder 2017    Type 2 diabetes mellitus 2013    Impotence of organic origin 2013    Peripheral nerve disease 2013    Sacral pain 2017    Hypogonadism in male 2017    LISANDRA (obstructive sleep apnea) on auto CPAP 2017    Weak urinary stream 2017    Mixed hyperlipidemia 2018    Medicare annual wellness visit, subsequent 2020    Hypothyroidism 2020    Septic shock 2020    Severe sepsis 2020    Lumbar radiculopathy 2022    Osteoarthritis of spine with radiculopathy, lumbar region 2022    DDD (degenerative disc disease), lumbar 2022    Spinal stenosis of lumbar region with neurogenic claudication 2022    History of lumbar " surgery 06/22/2022    Acute pain of left knee 04/12/2023    Nocturia 08/14/2023     Resolved Ambulatory Problems     Diagnosis Date Noted    HTN (hypertension) 07/06/2020     Past Medical History:   Diagnosis Date    Arthritis     Back pain     Cataract     Erectile dysfunction 10 + yrs    High cholesterol     History of medical problems     Low back pain     Obesity Forever    Obstructive sleep apnea on CPAP     Peripheral neuropathy     Quadriceps muscle rupture 08/2020    Sepsis 07/2006    Testosterone deficiency     UTI (urinary tract infection) 07/05/2020    Visual impairment Forever       Medication List:  Outpatient Encounter Medications as of 8/14/2023   Medication Sig Dispense Refill    atorvastatin (LIPITOR) 20 MG tablet Take 1 tablet by mouth Every Night. 90 tablet 1    BD Pen Needle Lelia U/F 32G X 4 MM misc       Cyanocobalamin (VITAMIN B-12 PO) Take 5,000 mcg by mouth Daily.      Dulaglutide (Trulicity) 3 MG/0.5ML solution pen-injector Inject 0.5 mL under the skin into the appropriate area as directed 1 (One) Time Per Week. 6 mL 1    FOLIC ACID PO Take 400 mcg by mouth 2 (Two) Times a Day.      gabapentin (NEURONTIN) 300 MG capsule Take 2 capsules by mouth 4 (Four) Times a Day. 720 capsule 0    glucose blood test strip Dispense based on insurance preference: Check 3 times a day Dx: E 11.9 300 each 4    glucose monitor monitoring kit Dispense one kit based on insurance preference and related supplies to check 4 times a day. Dx: E 11.9 1 each 0    Lancets misc Dispense based on insurance preference: Check 4 times a day. Dx: E 11.9 300 each 4    levothyroxine (SYNTHROID, LEVOTHROID) 50 MCG tablet TAKE 1 TABLET DAILY 90 tablet 3    metFORMIN (GLUCOPHAGE) 500 MG tablet Take 2 tablets by mouth 2 (Two) Times a Day With Meals. 360 tablet 1    Multiple Vitamin (MULTI-VITAMIN DAILY PO) Take 1 tablet by mouth Daily.      Omega-3 Fatty Acids (FISH OIL) 1000 MG capsule capsule Take 1 capsule by mouth 2 (Two) Times a  Day With Meals.      Toujeo SoloStar 300 UNIT/ML solution pen-injector injection INJECT 36 UNITS UNDER THE SKIN INTO THE APPROPRIATE AREA AS DIRECTED DAILY 9 mL 3    vitamin C (ASCORBIC ACID) 500 MG tablet Take 2 tablets by mouth Daily.      diflunisal (DOLOBID) 500 MG tablet tablet Take 1 tablet by mouth 2 (Two) Times a Day. 180 tablet 1    tamsulosin (FLOMAX) 0.4 MG capsule 24 hr capsule Take 1 capsule by mouth Daily. 90 capsule 2     No facility-administered encounter medications on file as of 8/14/2023.     Reviewed use of high risk medication in the elderly: yes  Reviewed for potential of harmful drug interactions in the elderly: yes    Past Surgical History:  Past Surgical History:   Procedure Laterality Date    BACK SURGERY      L5    CATARACT EXTRACTION EXTRACAPSULAR W/ INTRAOCULAR LENS IMPLANTATION Bilateral     COLONOSCOPY  Not sure    EPIDURAL Right 01/18/2022    Procedure: LUMBAR/SACRAL TRANSFORAMINAL EPIDURAL - likely right L5 plus right L4 versus right S1.... x2, 3-4 weeks apart;  Surgeon: Nicola Jerry MD;  [place] [facility] [place]  Service: Pain Management;  Laterality: Right;    EPIDURAL Right 02/15/2022    Procedure: LUMBAR/SACRAL TRANSFORAMINAL EPIDURAL - likely right L5 plus right L4 versus right S1.... x2, 3-4 weeks apart;  Surgeon: Nicola Jerry MD;  [place] [facility] [place];  Service: Pain Management;  Laterality: Right;    JOINT REPLACEMENT  Apr. 12, 2017    Total Rev. Surgery - R    LUMBAR LAMINECTOMY DISCECTOMY DECOMPRESSION Right 07/28/2022    Procedure: Open right lumbar four/ five, right lumbar five/sacral one decompression;  Surgeon: Isidoro Donaldson MD;  Location: [facility] OR;  Service: Neurosurgery;  Laterality: Right;    QUADRICEPS TENDON REPAIR Right 08/04/2020    Procedure: RIGHT OPEN  QUADRICEPS TENDON REPAIR;  Surgeon: Bahman Ordonez II, MD;  Location: [facility] OR;  Service: Orthopedics;  Laterality: Right;    ROTATOR CUFF REPAIR Left     SEPTOPLASTY    "   SEPTOPLASTY      1994 - Snoring    SPINE SURGERY  1985 & 2022    L-5 (l & r)    TOTAL SHOULDER ARTHROPLASTY W/ DISTAL CLAVICLE EXCISION Right 04/13/2017    Procedure: RT TOTAL SHOULDER REVERSE ARTHROPLASTY;  Surgeon: Jamal Prince MD;  [facility] [place];  Service:     VASECTOMY  1979        The following portions of the patient's history were reviewed and updated as appropriate: allergies, current medications, past family history, past medical history, past social history, past surgical history and problem list.    Review Of Systems:  Review of Systems   Constitutional:  Negative for activity change, appetite change and fatigue.   HENT:  Negative for congestion, postnasal drip, sinus pressure and sore throat.    Eyes:  Negative for blurred vision and itching.   Respiratory:  Negative for cough, shortness of breath and wheezing.    Cardiovascular:  Negative for chest pain.   Gastrointestinal:  Negative for abdominal pain, constipation, nausea, vomiting and GERD.   Endocrine: Negative for cold intolerance and heat intolerance.   Genitourinary:  Negative for difficulty urinating, dysuria and urinary incontinence.   Musculoskeletal:  Negative for back pain, joint swelling and neck pain.   Skin:  Negative for color change and rash.   Neurological:  Negative for dizziness, speech difficulty, weakness and memory problem.   Psychiatric/Behavioral:  Negative for behavioral problems, decreased concentration, suicidal ideas and depressed mood.    I have reviewed and confirmed the accuracy of the ROS as documented by the MA/LPN/RN Suleiman Byrnes Sr, MD    Objective     Physical Exam:  Vital Signs:  Visit Vitals  /78   Pulse 64   Temp 97.7 øF (36.5 øC) (Temporal)   Ht 172.7 cm (68\")   Wt 88.9 kg (196 lb)   SpO2 96%   BMI 29.80 kg/mý     BMI is >= 25 and <30. (Overweight) The following options were offered after discussion;: exercise counseling/recommendations and nutrition counseling/recommendations      Physical " Exam  Vitals reviewed.   Constitutional:       Appearance: He is well-developed.   HENT:      Head: Normocephalic and atraumatic.      Nose: Nose normal.   Eyes:      General: Lids are normal.      Conjunctiva/sclera: Conjunctivae normal.      Pupils: Pupils are equal, round, and reactive to light.   Cardiovascular:      Rate and Rhythm: Normal rate and regular rhythm.      Pulses: Normal pulses.      Heart sounds: Normal heart sounds.   Pulmonary:      Effort: Pulmonary effort is normal. No respiratory distress.      Breath sounds: Normal breath sounds.   Abdominal:      General: Bowel sounds are normal.      Palpations: Abdomen is soft.   Musculoskeletal:         General: Normal range of motion.      Cervical back: Normal range of motion and neck supple.   Skin:     General: Skin is warm and dry.      Capillary Refill: Capillary refill takes less than 2 seconds.   Neurological:      Mental Status: He is alert and oriented to person, place, and time.   Psychiatric:         Behavior: Behavior normal.         Thought Content: Thought content normal.         Judgment: Judgment normal.       Assessment & Plan   Assessment and Plan:  Patient Self-Management and Personalized Health Advice  The patient has been provided with information about: designing advance directives and preventive services including:   Annual Wellness Visit (AWV).    Advance Care Planning:  ACP discussion was held with the patient during this visit. Patient has an advance directive in EMR which is still valid.   Identification of Risk Factors:  Risk factors include: Advance Directive Discussion.    Diagnoses and all orders for this visit:    1. Medicare annual wellness visit, subsequent (Primary)  Assessment & Plan:  Medicare wellness completed.  Discussed advanced directives with patient.  Performed the Mini-Mental exam and patient scored 29/30.      2. DDD (degenerative disc disease), lumbar  Assessment & Plan:  Due to his worsening symptoms he  was given a handicap parking decal.  He was started on Diflunisal to treat his symptoms.     Orders:  -     diflunisal (DOLOBID) 500 MG tablet tablet; Take 1 tablet by mouth 2 (Two) Times a Day.  Dispense: 180 tablet; Refill: 1    3. Nocturia  -     tamsulosin (FLOMAX) 0.4 MG capsule 24 hr capsule; Take 1 capsule by mouth Daily.  Dispense: 90 capsule; Refill: 2          Follow Up:  No follow-ups on file.     An After Visit Summary and PPPS with all of these plans were given to the patient.

## 2023-08-14 NOTE — ASSESSMENT & PLAN NOTE
Due to his worsening symptoms he was given a handicap parking decal.  He was started on Diflunisal to treat his symptoms.    edibles/marijuana edibles taken last night 6pm/marijuana

## 2023-08-14 NOTE — ASSESSMENT & PLAN NOTE
Medicare wellness completed.  Discussed advanced directives with patient.  Performed the Mini-Mental exam and patient scored 29/30.

## 2023-08-30 ENCOUNTER — OFFICE VISIT (OUTPATIENT)
Dept: SLEEP MEDICINE | Facility: HOSPITAL | Age: 73
End: 2023-08-30
Payer: MEDICARE

## 2023-08-30 VITALS — OXYGEN SATURATION: 96 % | HEIGHT: 68 IN | HEART RATE: 67 BPM | WEIGHT: 202 LBS | BODY MASS INDEX: 30.62 KG/M2

## 2023-08-30 DIAGNOSIS — G47.33 OSA (OBSTRUCTIVE SLEEP APNEA): Primary | ICD-10-CM

## 2023-08-30 PROCEDURE — G0463 HOSPITAL OUTPT CLINIC VISIT: HCPCS

## 2023-08-30 NOTE — PROGRESS NOTES
"Follow Up [facility] Note     Chief Complaint:  LISANDRA     Primary Care Physician: Suleiman Byrnes Sr., MD    Interval History:   The patient is a 73 y.o. male  who I last saw 2022 and that note was reviewed.  The patient reports he is doing well without new complaints.  He goes to bed at midnight gets out of bed at 10 AM.  He will use the restroom during that time.    Review of Systems:    A complete review of systems was done and all were negative with the exception of the above    Social History:    Social History     Socioeconomic History    Marital status:    Tobacco Use    Smoking status: Former     Packs/day: 1.50     Years: 30.00     Pack years: 45.00     Types: Cigarettes     Start date: 1968     Quit date: 1997     Years since quittin.6     Passive exposure: Never    Smokeless tobacco: Never    Tobacco comments:     Quit \"cold turkey\"   Vaping Use    Vaping Use: Never used   Substance and Sexual Activity    Alcohol use: Yes     Alcohol/week: 1.0 standard drink     Types: 1 Drinks containing 0.5 oz of alcohol per week     Comment: These are occasional monthly #'s    Drug use: No    Sexual activity: Not Currently     Partners: Female     Birth control/protection: None       Allergies:  Lisinopril     Medication Review: His list was reviewed.      Vital Signs:    Vitals:    23 1103   Pulse: 67   SpO2: 96%   Weight: 91.6 kg (202 lb)   Height: 172.7 cm (68\")     Body mass index is 30.71 kg/mý.    Physical Exam:    Constitutional:  Well developed 73 y.o. male that appears in no apparent distress.  Awake & oriented times 3.  Normal mood with normal recent and remote memory and normal judgement.  Eyes:  Conjunctivae normal.  Oropharynx: Previously, moist mucous membranes without exudate and a large tongue and normal uvula that is long and tapered and mild-moderate narrowing of the posterior pharyngeal opening and class II-3 Mallampati airway.    Self-administered [place] " Sleepiness Scale test results: 1  0-5 Lower normal daytime sleepiness  6-10 Higher normal daytime sleepiness  11-12 Mild, 13-15 Moderate, & 16-24 Severe excessive daytime sleepiness     Downloaded PAP Data Evaluated For Therapeutic Response and Compliance:  DME is [facility] and he uses nasal pillows.  Downloads between 5/29 and 8/26/2023 compliance 100%.  Average usage is 8 hours and 45 minutes.  Average AHI is normal without significant leak.  Average auto CPAP pressure is 7.7 and his ResMed auto CPAP is 5-15    I have reviewed the above results and compared them with the patient's last downloads and reviewed with the patient.    Impression:   Obstructive sleep apnea adequately treated with ResMed auto CPAP. The patient appears to be at goal with good compliance and usage. The patient has no complaints of hypersomnolence.    Plan:  Good sleep hygiene measures should be maintained.  Weight loss would be beneficial in this patient who is obese by Body mass index is 30.71 kg/mý..      After evaluating the patient and assessing results available, the patient is benefiting from the treatment being provided.     The patient will continue ResMed auto CPAP.  Potential side effects of CPAP therapy reviewed and addressed as needed.  After clinical evaluation and review of downloads, I recommend no changes to the patient's pressures.  A new prescription will be sent to the patient's DME.    I answered all of the patient's questions.  The patient will call the [facility] for any problems with side effects of CPAP therapy and will follow up in 1 year.      John Mcguire MD  Sleep Medicine  08/30/23  11:27 EDT

## 2023-09-18 RX ORDER — DULAGLUTIDE 3 MG/.5ML
INJECTION, SOLUTION SUBCUTANEOUS
Qty: 6 ML | Refills: 3 | OUTPATIENT
Start: 2023-09-18

## 2023-10-04 ENCOUNTER — TELEPHONE (OUTPATIENT)
Dept: FAMILY MEDICINE CLINIC | Facility: CLINIC | Age: 73
End: 2023-10-04
Payer: MEDICARE

## 2023-10-04 DIAGNOSIS — R39.89 URINARY PROBLEM: Primary | ICD-10-CM

## 2023-10-10 RX ORDER — ATORVASTATIN CALCIUM 20 MG/1
20 TABLET, FILM COATED ORAL NIGHTLY
Qty: 90 TABLET | Refills: 3 | OUTPATIENT
Start: 2023-10-10

## 2023-10-11 NOTE — TELEPHONE ENCOUNTER
Spoke to patient's wife. She said he will call us back to talk to us about the medication and refill

## 2023-12-18 NOTE — TELEPHONE ENCOUNTER
Rx Refill Note  Requested Prescriptions     Pending Prescriptions Disp Refills    metFORMIN (GLUCOPHAGE) 500 MG tablet [Pharmacy Med Name: METFORMIN HCL TABS 500MG] 360 tablet 3     Sig: TAKE 2 TABLETS TWICE A DAY WITH MEALS      Last office visit with prescribing clinician: 4/20/2023   Last telemedicine visit with prescribing clinician: Visit date not found   Next office visit with prescribing clinician: Visit date not found                         Would you like a call back once the refill request has been completed: [] Yes [] No    If the office needs to give you a call back, can they leave a voicemail: [] Yes [] No    Remedios Weaver MA  12/18/23, 10:45 EST

## 2024-08-28 ENCOUNTER — OFFICE VISIT (OUTPATIENT)
Dept: SLEEP MEDICINE | Facility: HOSPITAL | Age: 74
End: 2024-08-28
Payer: MEDICARE

## 2024-08-28 VITALS — HEIGHT: 68 IN | WEIGHT: 203.6 LBS | OXYGEN SATURATION: 97 % | HEART RATE: 71 BPM | BODY MASS INDEX: 30.86 KG/M2

## 2024-08-28 DIAGNOSIS — G47.33 OSA (OBSTRUCTIVE SLEEP APNEA): Primary | ICD-10-CM

## 2024-08-28 PROCEDURE — 1159F MED LIST DOCD IN RCRD: CPT | Performed by: INTERNAL MEDICINE

## 2024-08-28 PROCEDURE — 99213 OFFICE O/P EST LOW 20 MIN: CPT | Performed by: INTERNAL MEDICINE

## 2024-08-28 PROCEDURE — G0463 HOSPITAL OUTPT CLINIC VISIT: HCPCS

## 2024-08-28 PROCEDURE — 1160F RVW MEDS BY RX/DR IN RCRD: CPT | Performed by: INTERNAL MEDICINE

## 2024-08-28 NOTE — PROGRESS NOTES
"Follow Up [facility] Note     Chief Complaint:  LISANDRA     Primary Care Physician: Suleiman Byrnes Sr., MD    Interval History:   The patient is a 74 y.o. male who I last saw 2023 and that note was reviewed.  The patient reports he is unchanged without new complaints.  He goes to bed 11:30 PM gets out of bed at 10 AM.  He will use the restroom during that time.    The patient reports that his air sense 10 auto CPAP is reporting end-of-life.    Review of Systems:    A complete review of systems was done and all were negative with the exception of the above    Social History:    Social History     Socioeconomic History    Marital status:    Tobacco Use    Smoking status: Former     Current packs/day: 0.00     Average packs/day: 1.5 packs/day for 30.0 years (45.0 ttl pk-yrs)     Types: Cigarettes     Start date: 1968     Quit date: 1997     Years since quittin.6     Passive exposure: Never    Smokeless tobacco: Never    Tobacco comments:     Quit \"cold turkey\"   Vaping Use    Vaping status: Never Used   Substance and Sexual Activity    Alcohol use: Yes     Alcohol/week: 1.0 standard drink of alcohol     Types: 1 Drinks containing 0.5 oz of alcohol per week     Comment: These are occasional monthly #'s    Drug use: No    Sexual activity: Not Currently     Partners: Female     Birth control/protection: None       Allergies:  Lisinopril     Medication Review:  Reviewed.      Vital Signs:    Vitals:    24 1100   Pulse: 71   SpO2: 97%   Weight: 92.4 kg (203 lb 9.6 oz)   Height: 172.7 cm (68\")     Body mass index is 30.96 kg/m².    Physical Exam:    Constitutional:  Well developed 74 y.o. male that appears in no apparent distress.  Awake & oriented times 3.  Normal mood with normal recent and remote memory and normal judgement.  Eyes:  Conjunctivae normal.  Oropharynx: Previously, moist mucous membranes without exudate and a large tongue and normal uvula that is long and tapered and " mild-moderate narrowing of the posterior pharyngeal opening class II-3 Mallampati airway.    Self-administered [place] Sleepiness Scale test results: 6  0-5 Lower normal daytime sleepiness  6-10 Higher normal daytime sleepiness  11-12 Mild, 13-15 Moderate, & 16-24 Severe excessive daytime sleepiness     Downloaded PAP Data Evaluated For Therapeutic Response and Compliance:  DME is [facility] and the patient uses a nasal mask.  Downloads between 5/29 and 8/26/2024 compliance 94%.  Average usage is 8 hours and 20 minutes.  Average AHI is normal without leak.  Average auto CPAP pressure is 7.8 and his ResMed auto CPAP is 5-15    I have reviewed the above results and compared them with the patient's last downloads and reviewed with the patient.    Impression:   Obstructive sleep apnea adequately treated with ResMed auto CPAP. The patient appears to be at goal with good compliance and usage. The patient has no complaints of hypersomnolence.     Plan:  Good sleep hygiene measures should be maintained.  Weight loss would be beneficial in this patient who is obese by Body mass index is 30.96 kg/m².      After evaluating the patient and assessing results available, the patient is benefiting from the treatment being provided.     The patient will continue ResMed auto CPAP.  Potential side effects of not using PAP therapy reviewed and addressed as needed.  After clinical evaluation and review of downloads, I recommend no changes to the patient's pressures.  A new prescription will be sent to the patient's DME for a new ResMed 11 auto CPAP.    I answered all of the patient's questions.  The patient will call the [facility] Center for any problems and will follow up in 3 months       John Mcguire MD  Sleep Medicine  08/28/24  11:37 EDT

## 2024-10-25 ENCOUNTER — TELEPHONE (OUTPATIENT)
Dept: FAMILY MEDICINE CLINIC | Facility: CLINIC | Age: 74
End: 2024-10-25

## 2024-10-25 NOTE — TELEPHONE ENCOUNTER
Caller: Andrea Fam    Relationship: Self    Best call back number: [phone]     What was the call regarding: PATIENT WOULD LIKE TO HAVE A TEST DONE TO CHECK FOR MARKERS FOR ALZHEIMER'S. PATIENT STATED THAT HE WAS ADVISED THAT THE TESTING HAS TO COME FROM DR. PEDERSEN OR DR. PEDERSEN MAY INSTRUCT HIS ENDOCRINOLOGIST TO RUN THE TESTS. PLEASE ADVISE.

## 2024-11-14 ENCOUNTER — OFFICE VISIT (OUTPATIENT)
Dept: FAMILY MEDICINE CLINIC | Facility: CLINIC | Age: 74
End: 2024-11-14
Payer: MEDICARE

## 2024-11-14 VITALS
WEIGHT: 204 LBS | HEIGHT: 68 IN | RESPIRATION RATE: 18 BRPM | DIASTOLIC BLOOD PRESSURE: 60 MMHG | BODY MASS INDEX: 30.92 KG/M2 | HEART RATE: 71 BPM | OXYGEN SATURATION: 96 % | SYSTOLIC BLOOD PRESSURE: 118 MMHG

## 2024-11-14 DIAGNOSIS — Z82.0 FAMILY HISTORY OF ALZHEIMER'S DISEASE: Primary | ICD-10-CM

## 2024-11-14 NOTE — PROGRESS NOTES
"Chief Complaint  Chief Complaint   Patient presents with    Labs Only     Pt here to have Alzheimers markers done         Subjective    History of Present Illness        Andrea Fam presents to [facility] for   History of Present Illness  The patient is a 74-year-old male who presents for evaluation of Alzheimer's disease.    He is here today at the request of his wife, who has expressed concern about the possibility of him developing Alzheimer's disease. He reports no current symptoms or issues related to this condition. He also mentions that his daughter has tested positive for a marker associated with Alzheimer's disease.    FAMILY HISTORY  His mother  of Alzheimer's disease. His father  at 96 due to heart attack. His grandmother  at 94 due to heart attack.     History of Present Illness      Objective   Vital Signs:   Visit Vitals  /60   Pulse 71   Resp 18   Ht 172.7 cm (68\")   Wt 92.5 kg (204 lb)   SpO2 96%   BMI 31.02 kg/m²          BMI is >= 30 and <35. (Class 1 Obesity). The following options were offered after discussion;: exercise counseling/recommendations and nutrition counseling/recommendations     Physical Exam  Vitals reviewed.   Constitutional:       Appearance: He is well-developed.   HENT:      Head: Normocephalic.      Right Ear: External ear normal.      Left Ear: External ear normal.      Nose: Nose normal.   Eyes:      Conjunctiva/sclera: Conjunctivae normal.   Cardiovascular:      Rate and Rhythm: Normal rate and regular rhythm.   Pulmonary:      Effort: Pulmonary effort is normal.      Breath sounds: Normal breath sounds.   Musculoskeletal:         General: Normal range of motion.      Cervical back: Normal range of motion and neck supple.   Skin:     General: Skin is warm and dry.      Capillary Refill: Capillary refill takes less than 2 seconds.   Neurological:      Mental Status: He is alert and oriented to person, place, and time.      "   Physical Exam           Result Review :  Results                            Assessment and Plan      Diagnoses and all orders for this visit:    1. Family history of Alzheimer's disease (Primary)  Assessment & Plan:  A blood test will be conducted to assess for Alzheimer's disease.  He is advised to engage in activities such as crossword puzzles, Sudoku, and brain games to maintain cognitive sharpness.  A Mini-Mental exam will be performed during his physical examination to evaluate for dementia and monitor for any cognitive decline. He is due for his Medicare wellness exam, which will include this assessment.    Orders:  -     Phosphorylated Tau 217 ([id number]), Plasma       Assessment & Plan             Follow Up   No follow-ups on file.  Patient was given instructions and counseling regarding his condition or for health maintenance advice. Please see specific information pulled into the AVS if appropriate.     Patient or patient representative verbalized consent for the use of Ambient Listening during the visit with  Suleiman Byrnes Sr, MD for chart documentation. 11/15/2024  15:25 EST

## 2024-11-15 PROBLEM — Z82.0 FAMILY HISTORY OF ALZHEIMER'S DISEASE: Status: ACTIVE | Noted: 2024-11-15

## 2024-11-15 NOTE — ASSESSMENT & PLAN NOTE
A blood test will be conducted to assess for Alzheimer's disease.  He is advised to engage in activities such as crossword puzzles, Sudoku, and brain games to maintain cognitive sharpness.  A Mini-Mental exam will be performed during his physical examination to evaluate for dementia and monitor for any cognitive decline. He is due for his Medicare wellness exam, which will include this assessment.  
none

## 2024-11-19 LAB
LABORATORY COMMENT REPORT: NORMAL
P-TAU217: 0.14 PG/ML (ref 0–0.18)

## 2024-12-04 ENCOUNTER — OFFICE VISIT (OUTPATIENT)
Dept: SLEEP MEDICINE | Facility: HOSPITAL | Age: 74
End: 2024-12-04
Payer: MEDICARE

## 2024-12-04 VITALS — HEIGHT: 68 IN | OXYGEN SATURATION: 94 % | BODY MASS INDEX: 31.07 KG/M2 | WEIGHT: 205 LBS | HEART RATE: 65 BPM

## 2024-12-04 DIAGNOSIS — G47.33 OSA (OBSTRUCTIVE SLEEP APNEA): Primary | ICD-10-CM

## 2024-12-04 PROCEDURE — G0463 HOSPITAL OUTPT CLINIC VISIT: HCPCS

## 2024-12-04 PROCEDURE — 1159F MED LIST DOCD IN RCRD: CPT | Performed by: INTERNAL MEDICINE

## 2024-12-04 PROCEDURE — 99213 OFFICE O/P EST LOW 20 MIN: CPT | Performed by: INTERNAL MEDICINE

## 2024-12-04 PROCEDURE — 1160F RVW MEDS BY RX/DR IN RCRD: CPT | Performed by: INTERNAL MEDICINE

## 2024-12-04 NOTE — PROGRESS NOTES
"Follow Up [facility] Note     Chief Complaint:  LISANDRA     Primary Care Physician: Suleiman Byrnes Sr., MD    Interval History:   The patient is a 74 y.o. male who I last saw 2024 and that note was reviewed.  At his last visit, orders for a new auto CPAP placed.  The patient is here today for follow-up.  He states he is doing well.  Recently he had a cold.  He goes to bed at midnight gets out of bed at 9:30 AM.  He will use the restroom.    Review of Systems:    A complete review of systems was done and all were negative with the exception of some lingering cough following a code    Social History:    Social History     Socioeconomic History    Marital status:    Tobacco Use    Smoking status: Former     Current packs/day: 0.00     Average packs/day: 1.5 packs/day for 30.0 years (45.0 ttl pk-yrs)     Types: Cigarettes     Start date: 1968     Quit date: 1997     Years since quittin.9     Passive exposure: Never    Smokeless tobacco: Never    Tobacco comments:     Quit \"cold turkey\"   Vaping Use    Vaping status: Never Used   Substance and Sexual Activity    Alcohol use: Yes     Alcohol/week: 1.0 standard drink of alcohol     Types: 1 Drinks containing 0.5 oz of alcohol per week     Comment: These are occasional monthly #'s    Drug use: No    Sexual activity: Not Currently     Partners: Female     Birth control/protection: None       Allergies:  Lisinopril     Medication Review:  Reviewed.      Vital Signs:    Vitals:    24 1143   Pulse: 65   SpO2: 94%   Weight: 93 kg (205 lb)   Height: 172.7 cm (68\")     Body mass index is 31.17 kg/m².    Physical Exam:    Constitutional:  Well developed 74 y.o. male that appears in no apparent distress.  Awake & oriented times 3.  Normal mood with normal recent and remote memory and normal judgement.  Eyes:  Conjunctivae normal.  Oropharynx: Previously, moist mucous membranes without exudate and a large tongue and normal uvula that is long " and tapered and mild-moderate narrowing of the posterior pharyngeal opening and class II-3 Mallampati airway.    Self-administered [place] Sleepiness Scale test results: 4, previously 6  0-5 Lower normal daytime sleepiness  6-10 Higher normal daytime sleepiness  11-12 Mild, 13-15 Moderate, & 16-24 Severe excessive daytime sleepiness     Downloaded PAP Data Evaluated For Therapeutic Response and Compliance:  DME is [facility] and the patient uses a nasal mask.  Downloads between 926 and 12-24 compliance 94%.  The patient does have a second device and therefore his compliance is 100%.  Average usage is 7 hours and 35 minutes.  Average AHI is normal without leak.  Average auto CPAP pressure is 7.9 and his ResMed auto CPAP is 5-12    I have reviewed the above results and compared them with the patient's last downloads and reviewed with the patient.    Impression:   Mild obstructive sleep apnea with respiratory effort related arousals by overnight polysomnogram 1/31/2018, weight 214 pounds, adequately treated with ResMed auto CPAP. The patient appears to be at goal with good compliance and usage. The patient has no complaints of hypersomnolence.     Plan:  Good sleep hygiene measures should be maintained.  Weight loss would be beneficial in this patient who is obese by Body mass index is 31.17 kg/m².      After evaluating the patient and assessing results available, the patient is benefiting from the treatment being provided.     The patient will continue ResMed auto CPAP.  Potential side effects of not using PAP therapy reviewed and addressed as needed.  After clinical evaluation and review of downloads, I recommend no changes to the patient's pressures.  A new prescription will be sent to the patient's DME as needed.    I answered all of the patient's questions.  The patient will call the Sleep Disorder Center for any problems and will follow up in August 2025.      John Mcguire MD  Sleep Medicine  12/04/24  11:57  EST

## 2024-12-27 ENCOUNTER — OFFICE VISIT (OUTPATIENT)
Dept: FAMILY MEDICINE CLINIC | Facility: CLINIC | Age: 74
End: 2024-12-27
Payer: MEDICARE

## 2024-12-27 VITALS
WEIGHT: 202 LBS | HEART RATE: 102 BPM | BODY MASS INDEX: 28.92 KG/M2 | DIASTOLIC BLOOD PRESSURE: 70 MMHG | SYSTOLIC BLOOD PRESSURE: 124 MMHG | HEIGHT: 70 IN | RESPIRATION RATE: 18 BRPM | OXYGEN SATURATION: 94 %

## 2024-12-27 DIAGNOSIS — Z00.00 MEDICARE ANNUAL WELLNESS VISIT, SUBSEQUENT: Primary | ICD-10-CM

## 2024-12-27 DIAGNOSIS — Z12.5 SCREENING FOR PROSTATE CANCER: ICD-10-CM

## 2024-12-27 DIAGNOSIS — E11.42 TYPE 2 DIABETES MELLITUS WITH DIABETIC POLYNEUROPATHY, WITH LONG-TERM CURRENT USE OF INSULIN: ICD-10-CM

## 2024-12-27 DIAGNOSIS — Z79.4 TYPE 2 DIABETES MELLITUS WITH DIABETIC POLYNEUROPATHY, WITH LONG-TERM CURRENT USE OF INSULIN: ICD-10-CM

## 2024-12-27 PROCEDURE — 1159F MED LIST DOCD IN RCRD: CPT | Performed by: FAMILY MEDICINE

## 2024-12-27 PROCEDURE — G0439 PPPS, SUBSEQ VISIT: HCPCS | Performed by: FAMILY MEDICINE

## 2024-12-27 PROCEDURE — 1126F AMNT PAIN NOTED NONE PRSNT: CPT | Performed by: FAMILY MEDICINE

## 2024-12-27 PROCEDURE — 1160F RVW MEDS BY RX/DR IN RCRD: CPT | Performed by: FAMILY MEDICINE

## 2024-12-27 RX ORDER — GLIMEPIRIDE 2 MG/1
2 TABLET ORAL DAILY
COMMUNITY
Start: 2024-10-25 | End: 2025-01-23

## 2024-12-27 NOTE — ASSESSMENT & PLAN NOTE
Orders:    CBC & Differential    Comprehensive Metabolic Panel    Lipid Panel With / Chol / HDL Ratio    TSH    Hemoglobin A1c    Microalbumin / Creatinine Urine Ratio - Urine, Clean Catch

## 2024-12-27 NOTE — ASSESSMENT & PLAN NOTE
Medicare wellness completed.  Discussed advanced directives with patient.  Performed the Mini-Mental exam and patient scored 30/30.

## 2024-12-27 NOTE — PROGRESS NOTES
Subjective   The ABCs of the Annual Wellness Visit  Medicare Wellness Visit      Andrea Fam is a 74 y.o. patient who presents for a Medicare Wellness Visit.    The following portions of the patient's history were reviewed and   updated as appropriate: allergies, current medications, past family history, past medical history, past social history, past surgical history, and problem list.    Compared to one year ago, the patient's physical   health is the same.  Compared to one year ago, the patient's mental   health is the same.    Recent Hospitalizations:  He was not admitted to the hospital during the last year.     Current Medical Providers:  Patient Care Team:  Suleiman Byrnes Sr., MD as PCP - General (Family Medicine)  Asya Henao MD as Consulting Physician (Endocrinology)  John Mcguire MD as Consulting Physician (Pulmonary Disease)  Tamia Carrillo MD as Consulting Physician (Ophthalmology)  Jamal Prince MD as Consulting Physician (Orthopedic Surgery)    Outpatient Medications Prior to Visit   Medication Sig Dispense Refill    atorvastatin (LIPITOR) 20 MG tablet Take 1 tablet by mouth Every Night. 90 tablet 1    BD Pen Needle Lelia U/F 32G X 4 MM misc       Cyanocobalamin (VITAMIN B-12 PO) Take 5,000 mcg by mouth Daily.      diflunisal (DOLOBID) 500 MG tablet tablet Take 1 tablet by mouth 2 (Two) Times a Day. 180 tablet 1    Dulaglutide (Trulicity) 3 MG/0.5ML solution pen-injector Inject 0.5 mL under the skin into the appropriate area as directed 1 (One) Time Per Week. 6 mL 1    FOLIC ACID PO Take 400 mcg by mouth 2 (Two) Times a Day.      gabapentin (NEURONTIN) 300 MG capsule Take 2 capsules by mouth 4 (Four) Times a Day. 720 capsule 0    glimepiride (AMARYL) 2 MG tablet Take 1 tablet by mouth Daily.      glucose blood test strip Dispense based on insurance preference: Check 3 times a day Dx: E 11.9 300 each 4    glucose monitor monitoring kit Dispense one kit based on insurance preference  and related supplies to check 4 times a day. Dx: E 11.9 1 each 0    Lancets misc Dispense based on insurance preference: Check 4 times a day. Dx: E 11.9 300 each 4    levothyroxine (SYNTHROID, LEVOTHROID) 50 MCG tablet TAKE 1 TABLET DAILY 90 tablet 3    metFORMIN (GLUCOPHAGE) 500 MG tablet Take 2 tablets by mouth 2 (Two) Times a Day With Meals. 360 tablet 1    Multiple Vitamin (MULTI-VITAMIN DAILY PO) Take 1 tablet by mouth Daily.      mupirocin (BACTROBAN) 2 % ointment Apply  topically to the appropriate area as directed 3 (Three) Times a Day. 1 g 0    Omega-3 Fatty Acids (FISH OIL) 1000 MG capsule capsule Take 1 capsule by mouth 2 (Two) Times a Day With Meals.      tamsulosin (FLOMAX) 0.4 MG capsule 24 hr capsule Take 1 capsule by mouth Daily. 90 capsule 2    Toujeo SoloStar 300 UNIT/ML solution pen-injector injection INJECT 36 UNITS UNDER THE SKIN INTO THE APPROPRIATE AREA AS DIRECTED DAILY 9 mL 3    vitamin C (ASCORBIC ACID) 500 MG tablet Take 2 tablets by mouth Daily.       No facility-administered medications prior to visit.     No opioid medication identified on active medication list. I have reviewed chart for other potential  high risk medication/s and harmful drug interactions in the elderly.      Aspirin is not on active medication list.   none .    Patient Active Problem List   Diagnosis    Rotator cuff insufficiency of right shoulder    Type 2 diabetes mellitus    Impotence of organic origin    Peripheral nerve disease    Sacral pain    Hypogonadism in male    LISANDRA (obstructive sleep apnea) on auto CPAP    Weak urinary stream    Mixed hyperlipidemia    Medicare annual wellness visit, subsequent    Hypothyroidism    Septic shock    Severe sepsis    Lumbar radiculopathy    Osteoarthritis of spine with radiculopathy, lumbar region    DDD (degenerative disc disease), lumbar    Spinal stenosis of lumbar region with neurogenic claudication    History of lumbar surgery    Acute pain of left knee    Nocturia     "Family history of Alzheimer's disease     Advance Care Planning Advance Directive is on file.  ACP discussion was held with the patient during this visit. Patient has an advance directive in EMR which is still valid.             Objective   Vitals:    12/27/24 1411   BP: 124/70   Pulse: 102   Resp: 18   SpO2: 94%   Weight: 91.6 kg (202 lb)   Height: 177.8 cm (70\")   PainSc: 0-No pain       Estimated body mass index is 28.98 kg/m² as calculated from the following:    Height as of this encounter: 177.8 cm (70\").    Weight as of this encounter: 91.6 kg (202 lb).                Does the patient have evidence of cognitive impairment? No          Mini-Mental State Examination (MMSE)        Instructions: Ask the questions in the order listed. Score one point for each correct response within each question or activity.      Maximum Score  Patient’s Score  Questions    5  5  “What is the year?  Season?  Date?  Day of the week?  Month?”    5  5  “Where are we now: State?  County?  Town/city?  Hospital?  Floor?”    3  3  The examiner names three unrelated objects clearly and slowly, then asks the patient to name all three of them. The patient’s response is used for scoring. The examiner repeats them until patient learns all of them, if possible. Number of trials: ___________    5  5  “I would like you to count backward from 100 by sevens.” (93, 86, 79, 72, 65, …) Stop after five answers.   Alternative: “Spell WORLD backwards.” ([id number])    3  3  “Earlier I told you the names of three things. Can you tell me what those were?”    2  2  Show the patient two simple objects, such as a wristwatch and a pencil, and ask the patient to name them.    1   1 “Repeat the phrase: ‘No ifs, ands, or buts.’”    3  3  “Take the paper in your right hand, fold it in half, and put it on the floor.”   (The examiner gives the patient a piece of blank paper.)    1  1  “Please read this and do what it says.” (Written instruction is “Close your " "eyes.”)    1  1  “Make up and write a sentence about anything.” (This sentence must contain a noun and a verb.)    1  1  “Please copy this picture.” (The examiner gives the patient a blank piece of paper and asks him/her to draw the symbol below. All 10 angles must be present and two must intersect.)             30  30  TOTAL                                                                                            Health  Risk Assessment    Smoking Status:  Social History     Tobacco Use   Smoking Status Former    Current packs/day: 0.00    Average packs/day: 1.5 packs/day for 30.0 years (45.0 ttl pk-yrs)    Types: Cigarettes    Start date: 1968    Quit date: 1997    Years since quittin.0    Passive exposure: Never   Smokeless Tobacco Never   Tobacco Comments    Quit \"cold turkey\"     Alcohol Consumption:  Social History     Substance and Sexual Activity   Alcohol Use Yes    Alcohol/week: 1.0 standard drink of alcohol    Types: 1 Drinks containing 0.5 oz of alcohol per week    Comment: These are occasional monthly #'s       Fall Risk Screen  STEADI Fall Risk Assessment has not been completed.    Depression Screening   Little interest or pleasure in doing things? Not at all   Feeling down, depressed, or hopeless? Not at all   PHQ-2 Total Score 0      Health Habits and Functional and Cognitive Screenin/27/2024     2:13 PM   Functional & Cognitive Status   Do you have difficulty preparing food and eating? No   Do you have difficulty bathing yourself, getting dressed or grooming yourself? No   Do you have difficulty using the toilet? No   Do you have difficulty moving around from place to place? No   Do you have trouble with steps or getting out of a bed or a chair? No   Current Diet Unhealthy Diet   Dental Exam Up to date   Eye Exam Up to date   Exercise (times per week) 0 times per week   Current Exercises Include No Regular Exercise   Do you need help using the phone?  No   Are you deaf or " do you have serious difficulty hearing?  No   Do you need help to go to places out of walking distance? No   Do you need help shopping? No   Do you need help preparing meals?  No   Do you need help with housework?  No   Do you need help with laundry? No   Do you need help taking your medications? No   Do you need help managing money? No   Do you ever drive or ride in a car without wearing a seat belt? No   Have you felt unusual stress, anger or loneliness in the last month? No   Who do you live with? Spouse   If you need help, do you have trouble finding someone available to you? No   Have you been bothered in the last four weeks by sexual problems? No   Do you have difficulty concentrating, remembering or making decisions? No           Age-appropriate Screening Schedule:  Refer to the list below for future screening recommendations based on patient's age, sex and/or medical conditions. Orders for these recommended tests are listed in the plan section. The patient has been provided with a written plan.    Health Maintenance List  Health Maintenance   Topic Date Due    ZOSTER VACCINE (2 of 2) 10/27/2017    Pneumococcal Vaccine 65+ (2 of 2 - PPSV23 or PCV20) 08/03/2018    RSV Vaccine - Adults (1 - 1-dose 75+ series) Never done    DIABETIC EYE EXAM  02/22/2023    URINE MICROALBUMIN  11/18/2023    COVID-19 Vaccine (3 - 2024-25 season) 09/01/2024    INFLUENZA VACCINE  03/31/2025 (Originally 7/1/2024)    HEMOGLOBIN A1C  04/18/2025    LIPID PANEL  10/18/2025    BMI FOLLOWUP  11/14/2025    ANNUAL WELLNESS VISIT  12/27/2025    DIABETIC FOOT EXAM  12/27/2025    COLORECTAL CANCER SCREENING  09/01/2027    TDAP/TD VACCINES (3 - Td or Tdap) 06/08/2028    HEPATITIS C SCREENING  Addressed                                                                                                                                                CMS Preventative Services Quick Reference  Risk Factors Identified During Encounter  None  "Identified    The above risks/problems have been discussed with the patient.  Pertinent information has been shared with the patient in the After Visit Summary.  An After Visit Summary and PPPS were made available to the patient.    Follow Up:   Next Medicare Wellness visit to be scheduled in 1 year.         Additional E&M Note during same encounter follows:  Patient has additional, significant, and separately identifiable condition(s)/problem(s) that require work above and beyond the Medicare Wellness Visit     Chief Complaint  Medicare Wellness-subsequent (AWV)    Subjective   HPI      Review of Systems   All other systems reviewed and are negative.             Objective   Vital Signs:  /70   Pulse 102   Resp 18   Ht 177.8 cm (70\")   Wt 91.6 kg (202 lb)   SpO2 94%   BMI 28.98 kg/m²   Physical Exam  Vitals reviewed.   Constitutional:       Appearance: He is well-developed.   HENT:      Head: Normocephalic and atraumatic.      Right Ear: Tympanic membrane, ear canal and external ear normal.      Left Ear: Tympanic membrane, ear canal and external ear normal.      Nose: Nose normal.      Mouth/Throat:      Mouth: Mucous membranes are moist.      Pharynx: Oropharynx is clear.   Eyes:      General: Lids are normal.      Conjunctiva/sclera: Conjunctivae normal.      Pupils: Pupils are equal, round, and reactive to light.   Cardiovascular:      Rate and Rhythm: Normal rate and regular rhythm.      Pulses: Normal pulses.      Heart sounds: Normal heart sounds.   Pulmonary:      Effort: Pulmonary effort is normal. No respiratory distress.      Breath sounds: Normal breath sounds.   Abdominal:      General: Bowel sounds are normal.      Palpations: Abdomen is soft.   Musculoskeletal:         General: Normal range of motion.      Cervical back: Normal range of motion and neck supple.   Skin:     General: Skin is warm and dry.      Capillary Refill: Capillary refill takes less than 2 seconds.   Neurological:      " General: No focal deficit present.      Mental Status: He is alert and oriented to person, place, and time. Mental status is at baseline.   Psychiatric:         Mood and Affect: Mood normal.         Behavior: Behavior normal.         Thought Content: Thought content normal.         Judgment: Judgment normal.         The following data was reviewed by: Suleiman Byrnes Sr, MD on 12/27/2024:              Assessment and Plan Additional age appropriate preventative wellness advice topics were discussed during today's preventative wellness exam(some topics already addressed during AWV portion of the note above):    Physical Activity: Advised cardiovascular activity 150 minutes per week as tolerated. (example brisk walk for 30 minutes, 5 days a week).   Nutrition: Discussed nutrition plan with patient. Information shared in after visit summary. Goal is for a well balanced diet to enhance overall health.   Healthy Weight: Discussed current and goal BMI with patient. Steps to attain this goal discussed. Information shared in after visit summary.           Medicare annual wellness visit, subsequent  Medicare wellness completed.  Discussed advanced directives with patient.  Performed the Mini-Mental exam and patient scored 30/30.         Type 2 diabetes mellitus with diabetic polyneuropathy, with long-term current use of insulin      Orders:    CBC & Differential    Comprehensive Metabolic Panel    Lipid Panel With / Chol / HDL Ratio    TSH    Hemoglobin A1c    Microalbumin / Creatinine Urine Ratio - Urine, Clean Catch    Screening for prostate cancer    Orders:    PSA Screen            Follow Up   No follow-ups on file.  Patient was given instructions and counseling regarding his condition or for health maintenance advice. Please see specific information pulled into the AVS if appropriate.

## 2025-02-19 ENCOUNTER — TELEPHONE (OUTPATIENT)
Dept: FAMILY MEDICINE CLINIC | Facility: CLINIC | Age: 75
End: 2025-02-19
Payer: MEDICARE

## 2025-02-19 NOTE — TELEPHONE ENCOUNTER
----- Message from Kami YARBORUGH sent at 2/18/2025  4:43 PM EST -----  Pt left office without completing labs. Please contact and schedule.  ----- Message -----  From: SYSTEM  Sent: 1/11/2025   1:04 AM EST  To: [facility]

## 2025-08-07 ENCOUNTER — OFFICE VISIT (OUTPATIENT)
Dept: SLEEP MEDICINE | Facility: HOSPITAL | Age: 75
End: 2025-08-07
Payer: MEDICARE

## 2025-08-07 VITALS — OXYGEN SATURATION: 95 % | WEIGHT: 205 LBS | BODY MASS INDEX: 29.35 KG/M2 | HEIGHT: 70 IN | HEART RATE: 75 BPM

## 2025-08-07 DIAGNOSIS — G47.33 OSA (OBSTRUCTIVE SLEEP APNEA): Primary | ICD-10-CM

## 2025-08-07 PROCEDURE — 99213 OFFICE O/P EST LOW 20 MIN: CPT | Performed by: INTERNAL MEDICINE

## 2025-08-07 PROCEDURE — 1160F RVW MEDS BY RX/DR IN RCRD: CPT | Performed by: INTERNAL MEDICINE

## 2025-08-07 PROCEDURE — 1159F MED LIST DOCD IN RCRD: CPT | Performed by: INTERNAL MEDICINE

## 2025-08-07 PROCEDURE — G0463 HOSPITAL OUTPT CLINIC VISIT: HCPCS

## (undated) DEVICE — TOOL MR8-15MH22 MR8 15CM MATCH 2.2MM: Brand: MIDAS REX MR8

## (undated) DEVICE — ELECTRD BLD EZ CLN STD 4IN

## (undated) DEVICE — TOOL MR8-14MH30 MR8 14CM MATCH 3MM: Brand: MIDAS REX MR8

## (undated) DEVICE — Device: Brand: PORTEX

## (undated) DEVICE — SOL ISO/ALC RUB 70PCT 4OZ

## (undated) DEVICE — TRAP FLD MINIVAC MEGADYNE 100ML

## (undated) DEVICE — SKIN PREP TRAY W/CHG: Brand: MEDLINE INDUSTRIES, INC.

## (undated) DEVICE — PAD GRND REM POLYHESIVE A/ DISP

## (undated) DEVICE — NDL SPINE 22G 31/2IN BLK

## (undated) DEVICE — GOWN,NON-REINFORCED,SIRUS,SET IN SLV,XXL: Brand: MEDLINE

## (undated) DEVICE — CONN TBG Y 5 IN 1 LF STRL

## (undated) DEVICE — BNDG ESMARK STRL 6INX12FT LF

## (undated) DEVICE — T-DRAPE,EXTREMITY,STERILE: Brand: MEDLINE

## (undated) DEVICE — MARKR SKIN W/RULR AND LBL

## (undated) DEVICE — SYR CONTRL PRESS/LO FIX/M/LL W/THMB/RNG 10ML

## (undated) DEVICE — HEWSON SUTURE RETRIEVER: Brand: HEWSON SUTURE RETRIEVER

## (undated) DEVICE — DRP C/ARM 41X74IN

## (undated) DEVICE — GLV SURG BIOGEL LTX PF 6 1/2

## (undated) DEVICE — GLV SURG BIOGEL LTX PF 8

## (undated) DEVICE — UNDYED BRAIDED (POLYGLACTIN 910), SYNTHETIC ABSORBABLE SUTURE: Brand: COATED VICRYL

## (undated) DEVICE — SUT VIC 2/0 X1 27IN J459H

## (undated) DEVICE — SPNG GZ WOVN 4X4IN 12PLY 10/BX STRL

## (undated) DEVICE — SUT VIC 1 CT 36IN J959H

## (undated) DEVICE — EPIDURAL TRAY: Brand: MEDLINE INDUSTRIES, INC.

## (undated) DEVICE — GLV SURG PREMIERPRO ORTHO LTX PF SZ8.5 BRN

## (undated) DEVICE — DRSNG PAD ABD 8X10IN STRL

## (undated) DEVICE — DRP MICROSCOPE 4 BINOCULAR CV 54X150IN

## (undated) DEVICE — SUT ETHIB 2 CV V37 MS/4 30IN MX69G

## (undated) DEVICE — NDL SPINE 22G 5IN BLK

## (undated) DEVICE — GLV SURG TRIUMPH PF LTX 7.5 STRL

## (undated) DEVICE — MAT FLR ABSORBENT LG 4FT 10 2.5FT

## (undated) DEVICE — IMMOB KN 3PNL DLX CANVS 22IN BLU

## (undated) DEVICE — APPL CHLORAPREP HI/LITE 26ML ORNG

## (undated) DEVICE — 3M™ MEDIPORE™ H SOFT CLOTH SURGICAL TAPE 2862, 2 INCH X 10 YARD (5CM X 9,1M), 12 ROLLS/CASE: Brand: 3M™ MEDIPORE™

## (undated) DEVICE — GLV SURG SENSICARE MICRO PF LF 6.5 STRL

## (undated) DEVICE — HANDPIECE SET WITH COAXIAL HIGH FLOW TIP AND SUCTION TUBE: Brand: INTERPULSE

## (undated) DEVICE — ANTIBACTERIAL UNDYED BRAIDED (POLYGLACTIN 910), SYNTHETIC ABSORBABLE SUTURE: Brand: COATED VICRYL

## (undated) DEVICE — OCCLUSIVE GAUZE STRIP,3% BISMUTH TRIBROMOPHENATE IN PETROLATUM BLEND: Brand: XEROFORM

## (undated) DEVICE — NEEDLE, QUINCKE 22GX3.5": Brand: MEDLINE INDUSTRIES, INC.

## (undated) DEVICE — 3M™ STERI-STRIP™ ANTIMICROBIAL SKIN CLOSURES 1/2 INCH X 4 INCHES 50/CARTON 4 CARTONS/CASE A1847: Brand: 3M™ STERI-STRIP™

## (undated) DEVICE — PENCL E/S ULTRAVAC TELESCP NOSE HOLSTR 10FT

## (undated) DEVICE — 3M™ STERI-DRAPE™ INSTRUMENT POUCH 1018: Brand: STERI-DRAPE™

## (undated) DEVICE — GLV SURG PREMIERPRO ORTHO LTX PF SZ8 BRN

## (undated) DEVICE — DISPOSABLE TOURNIQUET CUFF SINGLE BLADDER, SINGLE PORT AND QUICK CONNECT CONNECTOR: Brand: COLOR CUFF

## (undated) DEVICE — SHEET, DRAPE, SPLIT, STERILE: Brand: MEDLINE

## (undated) DEVICE — SUT VIC 4/0 P3 18IN J494G

## (undated) DEVICE — POOLE SUCTION HANDLE: Brand: CARDINAL HEALTH

## (undated) DEVICE — 2108 SERIES SAGITTAL BLADE (19.5 X 1.27 X 81.0MM)

## (undated) DEVICE — DISPOSABLE BIPOLAR FORCEPS 7 3/4" (19.7CM) SCOVILLE BAYONET, 1.5MM TIP AND 12 FT. (3.6M) CABLE: Brand: KIRWAN

## (undated) DEVICE — DRAPE,U/ SHT,SPLIT,PLAS,STERIL: Brand: MEDLINE

## (undated) DEVICE — SMOKE EVACUATION TUBING WITH 7/8 IN TO 1/4 IN REDUCER: Brand: BUFFALO FILTER

## (undated) DEVICE — PK NEURO SPINE 40

## (undated) DEVICE — KWIRE EQUINOXE GLENOID NITNL 2X190MM NS
Type: IMPLANTABLE DEVICE | Site: SHOULDER | Status: NON-FUNCTIONAL
Removed: 2017-04-13

## (undated) DEVICE — STPLR SKIN VISISTAT WD 35CT

## (undated) DEVICE — GLV SURG BIOGEL LTX PF 8 1/2

## (undated) DEVICE — GLV SURG SIGNATURE ESSENTIAL PF LTX SZ8.5

## (undated) DEVICE — PENCL ES MEGADINE EZ/CLEAN BUTN W/HOLSTR 10FT

## (undated) DEVICE — PK SHLDR OPN 40

## (undated) DEVICE — TOOL MR8-14BA50C MR8 14CM BALL CARB 5MM: Brand: MIDAS REX MR8

## (undated) DEVICE — PATIENT RETURN ELECTRODE, SINGLE-USE, CONTACT QUALITY MONITORING, ADULT, WITH 9FT CORD, FOR PATIENTS WEIGING OVER 33LBS. (15KG): Brand: MEGADYNE

## (undated) DEVICE — PK ORTHO MAJ 40

## (undated) DEVICE — TOWEL,OR,DSP,ST,BLUE,STD,4/PK,20PK/CS: Brand: MEDLINE

## (undated) DEVICE — BIT DRL EQUINOXE 2 AND 3.2MM

## (undated) DEVICE — DRSNG WND GZ PAD BORDERED 4X8IN STRL

## (undated) DEVICE — ZIP 16 SURGICAL SKIN CLOSURE DEVICE, PSA: Brand: ZIP 16 SURGICAL SKIN CLOSURE DEVICE